# Patient Record
Sex: FEMALE | URBAN - METROPOLITAN AREA
[De-identification: names, ages, dates, MRNs, and addresses within clinical notes are randomized per-mention and may not be internally consistent; named-entity substitution may affect disease eponyms.]

---

## 2017-03-28 ENCOUNTER — OFFICE (OUTPATIENT)
Dept: URBAN - METROPOLITAN AREA OTHER 6 | Facility: OTHER | Age: 77
End: 2017-03-28
Payer: COMMERCIAL

## 2017-03-28 VITALS
HEART RATE: 85 BPM | DIASTOLIC BLOOD PRESSURE: 76 MMHG | SYSTOLIC BLOOD PRESSURE: 120 MMHG | HEIGHT: 63 IN | WEIGHT: 126 LBS

## 2017-03-28 DIAGNOSIS — K52.89 OTHER SPECIFIED NONINFECTIVE GASTROENTERITIS AND COLITIS: ICD-10-CM

## 2017-03-28 DIAGNOSIS — R19.7 DIARRHEA, UNSPECIFIED: ICD-10-CM

## 2017-03-28 PROCEDURE — 99212 OFFICE O/P EST SF 10 MIN: CPT | Performed by: INTERNAL MEDICINE

## 2017-03-28 RX ORDER — CHOLESTYRAMINE 4 G/9G
4 POWDER, FOR SUSPENSION ORAL
Qty: 30 | Refills: 11 | Status: COMPLETED
Start: 2017-03-28 | End: 2017-06-20

## 2017-03-31 ENCOUNTER — TELEPHONE (OUTPATIENT)
Dept: FAMILY MEDICINE CLINIC | Facility: CLINIC | Age: 77
End: 2017-03-31

## 2017-03-31 RX ORDER — DOXYCYCLINE 100 MG/1
100 CAPSULE ORAL 2 TIMES DAILY
Qty: 20 CAPSULE | Refills: 0 | Status: SHIPPED | OUTPATIENT
Start: 2017-03-31 | End: 2017-05-10

## 2017-03-31 RX ORDER — CETIRIZINE HYDROCHLORIDE, PSEUDOEPHEDRINE HYDROCHLORIDE 5; 120 MG/1; MG/1
1 TABLET, FILM COATED, EXTENDED RELEASE ORAL 2 TIMES DAILY
Qty: 180 TABLET | Refills: 0 | Status: SHIPPED | OUTPATIENT
Start: 2017-03-31 | End: 2017-05-31 | Stop reason: SDUPTHER

## 2017-03-31 NOTE — TELEPHONE ENCOUNTER
----- Message from Tana Greenberg sent at 3/31/2017 10:47 AM EDT -----  She came in with  on Tuesday , hes very sick   So's she's unable to come in     lsd   6-4-16 patient would like antibotic , stated she caught what her  has .  Cough, congestion     Allergies sulfa     Pharmacy [facility]  [phone]    [facility]     Patient phone    [phone]

## 2017-04-06 ENCOUNTER — TELEPHONE (OUTPATIENT)
Dept: FAMILY MEDICINE CLINIC | Facility: CLINIC | Age: 77
End: 2017-04-06

## 2017-04-06 RX ORDER — OSELTAMIVIR PHOSPHATE 75 MG/1
75 CAPSULE ORAL DAILY
Qty: 14 CAPSULE | Refills: 0 | Status: SHIPPED | OUTPATIENT
Start: 2017-04-06 | End: 2017-05-10

## 2017-04-06 NOTE — TELEPHONE ENCOUNTER
MARKI: the following telephone message was sent under patients husbands chart:     Pt's  was diagnosed with type A flu as well as Pneumonia in his lung, and is currently admitted at [facility]. Pt is concerned his wfe (Samantha) may catch it despite her taking Doxycycline.    Patient, Samantha called she is currently taking doxycycline twice daily, Centrum Silver multi vit. And Zyrtec D daily and wants to know if there is anything else she can do to prevent getting the Flu. Explained Dr Jimenez was out of office, she requested I speak with another Doctor. I spoke with Dr Davidson who approved Tamiflu 75mg once daily # 14 be sent in, and patient to stop the Doxycycline.  Patient informed and RX sent to her local [facility]

## 2017-04-24 RX ORDER — ZOLPIDEM TARTRATE 10 MG/1
TABLET ORAL
Qty: 30 TABLET | Refills: 0 | OUTPATIENT
Start: 2017-04-24 | End: 2017-05-10 | Stop reason: SDUPTHER

## 2017-05-10 ENCOUNTER — OFFICE VISIT (OUTPATIENT)
Dept: FAMILY MEDICINE CLINIC | Facility: CLINIC | Age: 77
End: 2017-05-10

## 2017-05-10 VITALS
BODY MASS INDEX: 20.66 KG/M2 | WEIGHT: 121 LBS | HEIGHT: 64 IN | DIASTOLIC BLOOD PRESSURE: 76 MMHG | TEMPERATURE: 98.4 F | SYSTOLIC BLOOD PRESSURE: 130 MMHG

## 2017-05-10 DIAGNOSIS — L28.0 NEURODERMATITIS: Primary | ICD-10-CM

## 2017-05-10 DIAGNOSIS — F51.01 PRIMARY INSOMNIA: ICD-10-CM

## 2017-05-10 PROBLEM — G47.00 INSOMNIA: Status: ACTIVE | Noted: 2017-05-10

## 2017-05-10 PROCEDURE — 99213 OFFICE O/P EST LOW 20 MIN: CPT | Performed by: FAMILY MEDICINE

## 2017-05-10 RX ORDER — ZOLPIDEM TARTRATE 10 MG/1
10 TABLET ORAL NIGHTLY PRN
Qty: 30 TABLET | Refills: 5 | OUTPATIENT
Start: 2017-05-10 | End: 2017-05-10 | Stop reason: SDUPTHER

## 2017-05-10 RX ORDER — ZOLPIDEM TARTRATE 10 MG/1
10 TABLET ORAL NIGHTLY PRN
Qty: 30 TABLET | Refills: 5 | Status: SHIPPED | OUTPATIENT
Start: 2017-05-10 | End: 2017-05-31 | Stop reason: SDUPTHER

## 2017-05-31 RX ORDER — CETIRIZINE HYDROCHLORIDE, PSEUDOEPHEDRINE HYDROCHLORIDE 5; 120 MG/1; MG/1
1 TABLET, FILM COATED, EXTENDED RELEASE ORAL 2 TIMES DAILY
Qty: 60 TABLET | Refills: 6 | Status: SHIPPED | OUTPATIENT
Start: 2017-05-31 | End: 2018-02-20 | Stop reason: SDUPTHER

## 2017-05-31 RX ORDER — ZOLPIDEM TARTRATE 10 MG/1
10 TABLET ORAL NIGHTLY PRN
Qty: 30 TABLET | Refills: 5 | Status: SHIPPED | OUTPATIENT
Start: 2017-05-31 | End: 2017-11-13 | Stop reason: SDUPTHER

## 2017-06-20 ENCOUNTER — OFFICE (OUTPATIENT)
Dept: URBAN - METROPOLITAN AREA OTHER 6 | Facility: OTHER | Age: 77
End: 2017-06-20
Payer: COMMERCIAL

## 2017-06-20 VITALS
HEART RATE: 76 BPM | DIASTOLIC BLOOD PRESSURE: 70 MMHG | HEIGHT: 63 IN | SYSTOLIC BLOOD PRESSURE: 120 MMHG | WEIGHT: 122 LBS

## 2017-06-20 DIAGNOSIS — K52.89 OTHER SPECIFIED NONINFECTIVE GASTROENTERITIS AND COLITIS: ICD-10-CM

## 2017-06-20 PROCEDURE — 99212 OFFICE O/P EST SF 10 MIN: CPT | Performed by: INTERNAL MEDICINE

## 2017-06-29 ENCOUNTER — TRANSCRIBE ORDERS (OUTPATIENT)
Dept: ADMINISTRATIVE | Facility: HOSPITAL | Age: 77
End: 2017-06-29

## 2017-06-29 DIAGNOSIS — Z79.899 LONG-TERM USE OF HIGH-RISK MEDICATION: ICD-10-CM

## 2017-06-29 DIAGNOSIS — Z13.820 SCREENING FOR OSTEOPOROSIS: Primary | ICD-10-CM

## 2017-06-29 DIAGNOSIS — Z78.0 POST-MENOPAUSAL: ICD-10-CM

## 2017-07-12 ENCOUNTER — HOSPITAL ENCOUNTER (OUTPATIENT)
Dept: BONE DENSITY | Facility: HOSPITAL | Age: 77
Discharge: HOME | End: 2017-07-12
Attending: INTERNAL MEDICINE | Admitting: INTERNAL MEDICINE

## 2017-07-12 DIAGNOSIS — Z79.899 LONG-TERM USE OF HIGH-RISK MEDICATION: ICD-10-CM

## 2017-07-12 DIAGNOSIS — Z13.820 SCREENING FOR OSTEOPOROSIS: ICD-10-CM

## 2017-07-12 DIAGNOSIS — Z78.0 POST-MENOPAUSAL: ICD-10-CM

## 2017-07-12 PROCEDURE — 77080 DXA BONE DENSITY AXIAL: CPT

## 2017-10-18 ENCOUNTER — OFFICE (OUTPATIENT)
Dept: URBAN - METROPOLITAN AREA OTHER 6 | Facility: OTHER | Age: 77
End: 2017-10-18
Payer: COMMERCIAL

## 2017-10-18 VITALS
WEIGHT: 122 LBS | HEART RATE: 86 BPM | DIASTOLIC BLOOD PRESSURE: 70 MMHG | HEIGHT: 63 IN | SYSTOLIC BLOOD PRESSURE: 120 MMHG

## 2017-10-18 DIAGNOSIS — K52.89 OTHER SPECIFIED NONINFECTIVE GASTROENTERITIS AND COLITIS: ICD-10-CM

## 2017-10-18 DIAGNOSIS — R19.7 DIARRHEA, UNSPECIFIED: ICD-10-CM

## 2017-10-18 PROCEDURE — 99212 OFFICE O/P EST SF 10 MIN: CPT | Performed by: INTERNAL MEDICINE

## 2017-11-13 ENCOUNTER — OFFICE VISIT (OUTPATIENT)
Dept: FAMILY MEDICINE CLINIC | Facility: CLINIC | Age: 77
End: 2017-11-13

## 2017-11-13 VITALS
HEIGHT: 64 IN | SYSTOLIC BLOOD PRESSURE: 136 MMHG | OXYGEN SATURATION: 96 % | TEMPERATURE: 98.4 F | WEIGHT: 125 LBS | BODY MASS INDEX: 21.34 KG/M2 | HEART RATE: 80 BPM | DIASTOLIC BLOOD PRESSURE: 74 MMHG

## 2017-11-13 DIAGNOSIS — F51.01 PRIMARY INSOMNIA: Primary | ICD-10-CM

## 2017-11-13 PROCEDURE — 99213 OFFICE O/P EST LOW 20 MIN: CPT | Performed by: NURSE PRACTITIONER

## 2017-11-13 RX ORDER — CHOLECALCIFEROL (VITAMIN D3) 125 MCG
CAPSULE ORAL
COMMUNITY

## 2017-11-13 RX ORDER — ZOLPIDEM TARTRATE 10 MG/1
10 TABLET ORAL NIGHTLY PRN
Qty: 30 TABLET | Refills: 5 | OUTPATIENT
Start: 2017-11-13 | End: 2018-05-30 | Stop reason: SDUPTHER

## 2017-11-13 NOTE — PROGRESS NOTES
Subjective   Samantha Davila is a 77 y.o. female presents for six month follow up for [facility] up. Reports taking ambien for sleep. Sleeps approximately 6-7 hours.     Insomnia   The current episode started more than 1 year ago. The problem occurs daily. The problem has been unchanged. Associated symptoms include arthralgias. Pertinent negatives include no abdominal pain, anorexia, change in bowel habit, chest pain, chills, congestion, coughing, diaphoresis, fatigue, fever, headaches, joint swelling, myalgias, nausea, neck pain, numbness, rash, sore throat, swollen glands, urinary symptoms, vertigo, visual change, vomiting or weakness. Nothing aggravates the symptoms. She has tried nothing for the symptoms. The treatment provided moderate relief.        The following portions of the patient's history were reviewed and updated as appropriate: allergies, current medications, past family history, past medical history, past social history, past surgical history and problem list.    Review of Systems   Constitutional: Negative.  Negative for chills, diaphoresis, fatigue and fever.   HENT: Negative.  Negative for congestion and sore throat.    Eyes: Negative.    Respiratory: Negative.  Negative for cough.    Cardiovascular: Negative.  Negative for chest pain.   Gastrointestinal: Negative.  Negative for abdominal pain, anorexia, change in bowel habit, nausea and vomiting.   Endocrine: Negative.    Genitourinary: Negative.    Musculoskeletal: Positive for arthralgias. Negative for joint swelling, myalgias and neck pain.   Skin: Negative.  Negative for rash.   Allergic/Immunologic: Negative.    Neurological: Negative.  Negative for vertigo, weakness, numbness and headaches.   Hematological: Negative.    Psychiatric/Behavioral: The patient has insomnia.        Objective   Physical Exam   Constitutional: She is oriented to person, place, and time. She appears well-developed and well-nourished.   HENT:   Head: Normocephalic.    Cardiovascular: Normal rate, regular rhythm and normal heart sounds.  Exam reveals no gallop and no friction rub.    No murmur heard.  Pulmonary/Chest: Effort normal and breath sounds normal. No respiratory distress. She has no wheezes. She has no rales.   Abdominal: Soft. Bowel sounds are normal. She exhibits no distension. There is no tenderness.   Neurological: She is alert and oriented to person, place, and time.   Skin: Skin is warm and dry.   Psychiatric: She has a normal mood and affect.   Vitals reviewed.      Assessment/Plan   Samantha was seen today for insomnia.    Diagnoses and all orders for this visit:    Primary insomnia

## 2017-11-13 NOTE — PATIENT INSTRUCTIONS
Insomnia  Insomnia is a sleep disorder that makes it difficult to fall asleep or to stay asleep. Insomnia can cause tiredness (fatigue), low energy, difficulty concentrating, mood swings, and poor performance at work or school.   There are three different ways to classify insomnia:   · Difficulty falling asleep.  · Difficulty staying asleep.  · Waking up too early in the morning.  Any type of insomnia can be long-term (chronic) or short-term (acute). Both are common. Short-term insomnia usually lasts for three months or less. Chronic insomnia occurs at least three times a week for longer than three months.  CAUSES   Insomnia may be caused by another condition, situation, or substance, such as:  · Anxiety.  · Certain medicines.  · Gastroesophageal reflux disease (GERD) or other gastrointestinal conditions.  · Asthma or other breathing conditions.  · Restless legs syndrome, sleep apnea, or other sleep disorders.  · Chronic pain.  · Menopause. This may include hot flashes.  · Stroke.  · Abuse of alcohol, tobacco, or illegal drugs.  · Depression.  · Caffeine.    · Neurological disorders, such as Alzheimer disease.  · An overactive thyroid (hyperthyroidism).  The cause of insomnia may not be known.  RISK FACTORS  Risk factors for insomnia include:  · Gender. Women are more commonly affected than men.  · Age. Insomnia is more common as you get older.  · Stress. This may involve your professional or personal life.  · Income. Insomnia is more common in people with lower income.  · Lack of exercise.    · Irregular work schedule or night shifts.  · Traveling between different time zones.  SIGNS AND SYMPTOMS  If you have insomnia, trouble falling asleep or trouble staying asleep is the main symptom. This may lead to other symptoms, such as:  · Feeling fatigued.  · Feeling nervous about going to sleep.  · Not feeling rested in the morning.  · Having trouble concentrating.  · Feeling irritable, anxious, or depressed.  TREATMENT    Treatment for insomnia depends on the cause. If your insomnia is caused by an underlying condition, treatment will focus on addressing the condition. Treatment may also include:   · Medicines to help you sleep.  · Counseling or therapy.  · Lifestyle adjustments.  HOME CARE INSTRUCTIONS   · Take medicines only as directed by your health care provider.  · Keep regular sleeping and waking hours. Avoid naps.  · Keep a sleep diary to help you and your health care provider figure out what could be causing your insomnia. Include:      When you sleep.    When you wake up during the night.    How well you sleep.      How rested you feel the next day.    Any side effects of medicines you are taking.    What you eat and drink.    · Make your bedroom a comfortable place where it is easy to fall asleep:    Put up shades or special blackout curtains to block light from outside.    Use a white noise machine to block noise.    Keep the temperature cool.    · Exercise regularly as directed by your health care provider. Avoid exercising right before bedtime.  · Use relaxation techniques to manage stress. Ask your health care provider to suggest some techniques that may work well for you. These may include:    Breathing exercises.    Routines to release muscle tension.    Visualizing peaceful scenes.  · Cut back on alcohol, caffeinated beverages, and cigarettes, especially close to bedtime. These can disrupt your sleep.  · Do not overeat or eat spicy foods right before bedtime. This can lead to digestive discomfort that can make it hard for you to sleep.  · Limit screen use before bedtime. This includes:    Watching TV.    Using your smartphone, tablet, and computer.  · Stick to a routine. This can help you fall asleep faster. Try to do a quiet activity, brush your teeth, and go to bed at the same time each night.  · Get out of bed if you are still awake after 15 minutes of trying to sleep. Keep the lights down, but try reading or  doing a quiet activity. When you feel sleepy, go back to bed.  · Make sure that you drive carefully. Avoid driving if you feel very sleepy.  · Keep all follow-up appointments as directed by your health care provider. This is important.  SEEK MEDICAL CARE IF:   · You are tired throughout the day or have trouble in your daily routine due to sleepiness.  · You continue to have sleep problems or your sleep problems get worse.  SEEK IMMEDIATE MEDICAL CARE IF:   · You have serious thoughts about hurting yourself or someone else.     This information is not intended to replace advice given to you by your health care provider. Make sure you discuss any questions you have with your health care provider.     Document Released: 12/15/2001 Document Revised: 09/07/2016 Document Reviewed: 09/18/2015  Elsevier Interactive Patient Education ©2017 [employer]

## 2018-01-24 ENCOUNTER — OFFICE (OUTPATIENT)
Dept: URBAN - METROPOLITAN AREA OTHER 6 | Facility: OTHER | Age: 78
End: 2018-01-24
Payer: COMMERCIAL

## 2018-01-24 VITALS
SYSTOLIC BLOOD PRESSURE: 116 MMHG | DIASTOLIC BLOOD PRESSURE: 64 MMHG | HEIGHT: 63 IN | HEART RATE: 84 BPM | WEIGHT: 122 LBS

## 2018-01-24 DIAGNOSIS — K52.89 OTHER SPECIFIED NONINFECTIVE GASTROENTERITIS AND COLITIS: ICD-10-CM

## 2018-01-24 DIAGNOSIS — R19.7 DIARRHEA, UNSPECIFIED: ICD-10-CM

## 2018-01-24 PROCEDURE — 99212 OFFICE O/P EST SF 10 MIN: CPT | Performed by: INTERNAL MEDICINE

## 2018-02-20 RX ORDER — CETIRIZINE HCL, PSEUDOEPHEDRINE HCL 5; 120 MG/1; MG/1
TABLET, EXTENDED RELEASE ORAL
Qty: 60 TABLET | Refills: 6 | Status: SHIPPED | OUTPATIENT
Start: 2018-02-20 | End: 2019-02-25 | Stop reason: SDUPTHER

## 2018-04-05 RX ORDER — AMOXICILLIN 500 MG/1
500 CAPSULE ORAL 3 TIMES DAILY
Qty: 30 CAPSULE | Refills: 0 | Status: SHIPPED | OUTPATIENT
Start: 2018-04-05 | End: 2018-04-06 | Stop reason: SDUPTHER

## 2018-04-06 ENCOUNTER — OFFICE VISIT (OUTPATIENT)
Dept: INTERNAL MEDICINE | Facility: CLINIC | Age: 78
End: 2018-04-06

## 2018-04-06 VITALS
SYSTOLIC BLOOD PRESSURE: 120 MMHG | HEART RATE: 89 BPM | WEIGHT: 123.4 LBS | RESPIRATION RATE: 16 BRPM | HEIGHT: 64 IN | TEMPERATURE: 98.2 F | BODY MASS INDEX: 21.07 KG/M2 | DIASTOLIC BLOOD PRESSURE: 80 MMHG | OXYGEN SATURATION: 95 %

## 2018-04-06 DIAGNOSIS — J06.9 VIRAL UPPER RESPIRATORY TRACT INFECTION: Primary | ICD-10-CM

## 2018-04-06 PROCEDURE — 99213 OFFICE O/P EST LOW 20 MIN: CPT | Performed by: NURSE PRACTITIONER

## 2018-04-06 RX ORDER — AZITHROMYCIN 1 G
1 PACKET (EA) ORAL ONCE
Qty: 1 PACKET | Refills: 0 | Status: SHIPPED | OUTPATIENT
Start: 2018-04-06 | End: 2018-04-06

## 2018-04-08 PROBLEM — J06.9 VIRAL UPPER RESPIRATORY TRACT INFECTION: Status: ACTIVE | Noted: 2018-04-08

## 2018-04-08 NOTE — PROGRESS NOTES
Subjective   Samantha Davila is a 78 y.o. female.     Chief Complaint   Patient presents with   • Other     Sinus pressure         Mrs. Dvaila is a known patient to our practice here today for evaluation of URI. She has been experiencing Ha, sore throat, and some coughing for the past 5-7 days. No fever or confusion. No bowel or bladder problems. She has been trying OTC mucinex and dayquil. This has been successful in temporarily relieving her symptoms. No other complaints at this time. No exacerbating or aggravating symptoms. No other complaints at this time.              The following portions of the patient's history were reviewed and updated as appropriate: allergies, current medications, past social history and problem list.    Review of Systems   Constitutional: Negative.    HENT: Positive for sore throat.    Eyes: Negative.    Respiratory: Positive for cough.    Endocrine: Negative.    Musculoskeletal: Negative.    Allergic/Immunologic: Negative.    Neurological: Positive for headaches.       Objective   Vitals:    04/06/18 1454   BP: 120/80   Pulse: 89   Resp: 16   Temp: 98.2 °F (36.8 °C)   SpO2: 95%     Physical Exam   Constitutional: She is oriented to person, place, and time. She appears well-developed and well-nourished.   HENT:   Head: Normocephalic.   Right Ear: External ear normal.   Left Ear: External ear normal.   Nose: Nose normal.   Mouth/Throat: Oropharynx is clear and moist.   Eyes: Conjunctivae and EOM are normal. Pupils are equal, round, and reactive to light.   Neck: Normal range of motion.   Cardiovascular: Normal rate, regular rhythm, normal heart sounds and intact distal pulses.    Pulmonary/Chest: She has wheezes in the right upper field and the left upper field.   Abdominal: Soft. Bowel sounds are normal.   Neurological: She is alert and oriented to person, place, and time. She has normal reflexes.   Skin: Skin is warm and dry. Capillary refill takes more than 3 seconds.   Psychiatric: She  has a normal mood and affect. Her behavior is normal. Judgment and thought content normal.       Assessment/Plan   Problem List Items Addressed This Visit     Viral upper respiratory tract infection - Primary      Other Visit Diagnoses    None.            Likely viral URI. Supportive OTC measures. Will add z-renuka.

## 2018-05-31 RX ORDER — ZOLPIDEM TARTRATE 10 MG/1
TABLET ORAL
Qty: 30 TABLET | Refills: 5 | OUTPATIENT
Start: 2018-05-31 | End: 2018-10-01 | Stop reason: SDUPTHER

## 2018-08-23 ENCOUNTER — OFFICE (OUTPATIENT)
Dept: URBAN - METROPOLITAN AREA CLINIC 66 | Facility: CLINIC | Age: 78
End: 2018-08-23
Payer: COMMERCIAL

## 2018-08-23 VITALS
DIASTOLIC BLOOD PRESSURE: 80 MMHG | HEIGHT: 63 IN | WEIGHT: 116 LBS | HEART RATE: 84 BPM | SYSTOLIC BLOOD PRESSURE: 110 MMHG

## 2018-08-23 DIAGNOSIS — R19.7 DIARRHEA, UNSPECIFIED: ICD-10-CM

## 2018-08-23 DIAGNOSIS — K52.89 OTHER SPECIFIED NONINFECTIVE GASTROENTERITIS AND COLITIS: ICD-10-CM

## 2018-08-23 PROCEDURE — 99212 OFFICE O/P EST SF 10 MIN: CPT | Performed by: INTERNAL MEDICINE

## 2018-10-02 RX ORDER — ZOLPIDEM TARTRATE 10 MG/1
10 TABLET ORAL NIGHTLY PRN
Qty: 30 TABLET | Refills: 5 | Status: SHIPPED | OUTPATIENT
Start: 2018-10-02 | End: 2018-10-30

## 2018-10-04 RX ORDER — ZOLPIDEM TARTRATE 10 MG/1
TABLET ORAL
Qty: 30 TABLET | Refills: 0 | Status: SHIPPED | OUTPATIENT
Start: 2018-10-04 | End: 2018-12-19 | Stop reason: SDUPTHER

## 2018-10-30 ENCOUNTER — OFFICE VISIT (OUTPATIENT)
Dept: INTERNAL MEDICINE | Facility: CLINIC | Age: 78
End: 2018-10-30

## 2018-10-30 VITALS
BODY MASS INDEX: 20.22 KG/M2 | DIASTOLIC BLOOD PRESSURE: 75 MMHG | HEIGHT: 64 IN | SYSTOLIC BLOOD PRESSURE: 126 MMHG | OXYGEN SATURATION: 96 % | TEMPERATURE: 98.7 F | HEART RATE: 83 BPM | WEIGHT: 118.4 LBS

## 2018-10-30 DIAGNOSIS — F51.01 PRIMARY INSOMNIA: Primary | ICD-10-CM

## 2018-10-30 PROCEDURE — 99213 OFFICE O/P EST LOW 20 MIN: CPT | Performed by: FAMILY MEDICINE

## 2018-10-30 RX ORDER — ZOLPIDEM TARTRATE 10 MG/1
10 TABLET ORAL NIGHTLY PRN
Qty: 30 TABLET | Refills: 5 | Status: SHIPPED | OUTPATIENT
Start: 2018-10-30 | End: 2019-01-28

## 2018-10-30 RX ORDER — ZOLPIDEM TARTRATE 10 MG/1
10 TABLET ORAL NIGHTLY PRN
Qty: 30 TABLET | Refills: 5 | Status: SHIPPED | OUTPATIENT
Start: 2018-10-30 | End: 2018-10-30

## 2018-10-30 NOTE — PROGRESS NOTES
"CC:INSOMNIA    Subjective.../HPI  Patient present today with    I have reviewed the patient's medical history in detail and updated the computerized patient record.    Past Medical History:   Diagnosis Date   • Insomnia        No past surgical history on file.    No family history on file.    Social History     Social History   • Marital status:      Spouse name: N/A   • Number of children: N/A   • Years of education: N/A     Occupational History   • Not on file.     Social History Main Topics   • Smoking status: Never Smoker   • Smokeless tobacco: Not on file   • Alcohol use Yes   • Drug use: Unknown   • Sexual activity: Defer     Other Topics Concern   • Not on file     Social History Narrative   • No narrative on file         There is no immunization history on file for this patient.    Review of Systems:   Review of Systems   Constitutional: Negative.    HENT: Negative.    Eyes: Negative.    Respiratory: Negative.    Cardiovascular: Negative.    Gastrointestinal: Negative.    Endocrine: Negative.    Genitourinary: Negative.    Musculoskeletal: Negative.    Skin: Negative.    Allergic/Immunologic: Negative.    Neurological: Negative.    Hematological: Negative.    Psychiatric/Behavioral: Negative.          Physical Exam   Constitutional: She is oriented to person, place, and time. She appears well-developed and well-nourished.   Cardiovascular: Normal rate, regular rhythm and normal heart sounds.    Pulmonary/Chest: Effort normal and breath sounds normal.   Neurological: She is alert and oriented to person, place, and time.   Psychiatric: She has a normal mood and affect. Her behavior is normal.   Vitals reviewed.        Vital Signs     Vitals:    10/30/18 1355   BP: 126/75   BP Location: Left arm   Patient Position: Sitting   Cuff Size: Small Adult   Pulse: 83   Temp: 98.7 °F (37.1 °C)   TempSrc: Oral   SpO2: 96%   Weight: 53.7 kg (118 lb 6.4 oz)   Height: 161.3 cm (63.5\")          Results Review:  "     REVIEWED AND DISCUSSED CLINICAL RESULTS WITH PATIENT      Requested Prescriptions      No prescriptions requested or ordered in this encounter         Current Outpatient Prescriptions:   •  ALL DAY ALLERGY-D 5-120 MG per 12 hr tablet, TAKE ONE TABLET BY MOUTH TWICE DAILY, Disp: 60 tablet, Rfl: 6  •  Budesonide (ENTOCORT EC) 3 MG 24 hr capsule, TAKE 3 CS PO QD, Disp: , Rfl: 12  •  Cholecalciferol (VITAMIN D3) 2000 units tablet, Take  by mouth., Disp: , Rfl:   •  Multiple Vitamins-Minerals (WOMENS 50+ MULTI VITAMIN/MIN PO), Take  by mouth., Disp: , Rfl:   •  Probiotic Product (PROBIOTIC DAILY PO), Take  by mouth., Disp: , Rfl:   •  zolpidem (AMBIEN) 10 MG tablet, Take 1 tablet by mouth At Night As Needed for Sleep., Disp: 30 tablet, Rfl: 5  •  zolpidem (AMBIEN) 10 MG tablet, TAKE 1 TABLET BY MOUTH EVERY NIGHT AT BEDTIME AS NEEDED FOR SLEEP, Disp: 30 tablet, Rfl: 0    Procedures          There are no diagnoses linked to this encounter.     No Follow-up on file.    Bobby Jimenez M.D  10/30/18  2:08 PM

## 2018-11-20 ENCOUNTER — CLINICAL SUPPORT (OUTPATIENT)
Dept: INTERNAL MEDICINE | Facility: CLINIC | Age: 78
End: 2018-11-20

## 2018-11-20 DIAGNOSIS — Z23 FLU VACCINE NEED: Primary | ICD-10-CM

## 2018-11-20 PROCEDURE — G0008 ADMIN INFLUENZA VIRUS VAC: HCPCS | Performed by: NURSE PRACTITIONER

## 2018-11-20 PROCEDURE — 90662 IIV NO PRSV INCREASED AG IM: CPT | Performed by: NURSE PRACTITIONER

## 2018-12-14 ENCOUNTER — OFFICE (OUTPATIENT)
Dept: URBAN - METROPOLITAN AREA CLINIC 65 | Facility: CLINIC | Age: 78
End: 2018-12-14
Payer: COMMERCIAL

## 2018-12-14 VITALS
SYSTOLIC BLOOD PRESSURE: 122 MMHG | WEIGHT: 116 LBS | HEIGHT: 63 IN | HEART RATE: 76 BPM | DIASTOLIC BLOOD PRESSURE: 90 MMHG

## 2018-12-14 DIAGNOSIS — R19.7 DIARRHEA, UNSPECIFIED: ICD-10-CM

## 2018-12-14 DIAGNOSIS — K52.89 OTHER SPECIFIED NONINFECTIVE GASTROENTERITIS AND COLITIS: ICD-10-CM

## 2018-12-14 PROCEDURE — 99214 OFFICE O/P EST MOD 30 MIN: CPT | Performed by: INTERNAL MEDICINE

## 2018-12-19 ENCOUNTER — TRANSCRIBE ORDERS (OUTPATIENT)
Dept: LAB | Facility: HOSPITAL | Age: 78
End: 2018-12-19

## 2018-12-19 ENCOUNTER — LAB (OUTPATIENT)
Dept: LAB | Facility: HOSPITAL | Age: 78
End: 2018-12-19
Attending: INTERNAL MEDICINE

## 2018-12-19 DIAGNOSIS — E55.9 VITAMIN D DEFICIENCY: ICD-10-CM

## 2018-12-19 DIAGNOSIS — K58.9 IRRITABLE BOWEL SYNDROME, UNSPECIFIED TYPE: ICD-10-CM

## 2018-12-19 DIAGNOSIS — R19.7 DIARRHEA, UNSPECIFIED TYPE: ICD-10-CM

## 2018-12-19 DIAGNOSIS — K52.839 MICROSCOPIC COLITIS, UNSPECIFIED MICROSCOPIC COLITIS TYPE: Primary | ICD-10-CM

## 2018-12-19 DIAGNOSIS — K52.839 MICROSCOPIC COLITIS, UNSPECIFIED MICROSCOPIC COLITIS TYPE: ICD-10-CM

## 2018-12-19 LAB
25(OH)D3 SERPL-MCNC: 85.9 NG/ML (ref 30–100)
ALBUMIN SERPL-MCNC: 4.2 G/DL (ref 3.5–5.2)
ALBUMIN/GLOB SERPL: 1.6 G/DL
ALP SERPL-CCNC: 69 U/L (ref 39–117)
ALT SERPL W P-5'-P-CCNC: 13 U/L (ref 1–33)
ANION GAP SERPL CALCULATED.3IONS-SCNC: 11.8 MMOL/L
AST SERPL-CCNC: 18 U/L (ref 1–32)
BASOPHILS # BLD AUTO: 0.04 10*3/MM3 (ref 0–0.2)
BASOPHILS NFR BLD AUTO: 0.5 % (ref 0–1.5)
BILIRUB SERPL-MCNC: 0.4 MG/DL (ref 0.1–1.2)
BUN BLD-MCNC: 11 MG/DL (ref 8–23)
BUN/CREAT SERPL: 18 (ref 7–25)
CALCIUM SPEC-SCNC: 9.6 MG/DL (ref 8.6–10.5)
CHLORIDE SERPL-SCNC: 102 MMOL/L (ref 98–107)
CO2 SERPL-SCNC: 27.2 MMOL/L (ref 22–29)
CORTIS SERPL-MCNC: 7.09 MCG/DL
CREAT BLD-MCNC: 0.61 MG/DL (ref 0.57–1)
DEPRECATED RDW RBC AUTO: 46.6 FL (ref 37–54)
EOSINOPHIL # BLD AUTO: 0.34 10*3/MM3 (ref 0–0.7)
EOSINOPHIL NFR BLD AUTO: 4.4 % (ref 0.3–6.2)
ERYTHROCYTE [DISTWIDTH] IN BLOOD BY AUTOMATED COUNT: 13.7 % (ref 11.7–13)
GFR SERPL CREATININE-BSD FRML MDRD: 95 ML/MIN/1.73
GLOBULIN UR ELPH-MCNC: 2.7 GM/DL
GLUCOSE BLD-MCNC: 97 MG/DL (ref 65–99)
HCT VFR BLD AUTO: 39.5 % (ref 35.6–45.5)
HGB BLD-MCNC: 13.2 G/DL (ref 11.9–15.5)
IMM GRANULOCYTES # BLD: 0.01 10*3/MM3 (ref 0–0.03)
IMM GRANULOCYTES NFR BLD: 0.1 % (ref 0–0.5)
LYMPHOCYTES # BLD AUTO: 2.52 10*3/MM3 (ref 0.9–4.8)
LYMPHOCYTES NFR BLD AUTO: 32.4 % (ref 19.6–45.3)
MCH RBC QN AUTO: 31.2 PG (ref 26.9–32)
MCHC RBC AUTO-ENTMCNC: 33.4 G/DL (ref 32.4–36.3)
MCV RBC AUTO: 93.4 FL (ref 80.5–98.2)
MONOCYTES # BLD AUTO: 0.57 10*3/MM3 (ref 0.2–1.2)
MONOCYTES NFR BLD AUTO: 7.3 % (ref 5–12)
NEUTROPHILS # BLD AUTO: 4.31 10*3/MM3 (ref 1.9–8.1)
NEUTROPHILS NFR BLD AUTO: 55.4 % (ref 42.7–76)
PLATELET # BLD AUTO: 338 10*3/MM3 (ref 140–500)
PMV BLD AUTO: 9.9 FL (ref 6–12)
POTASSIUM BLD-SCNC: 4.2 MMOL/L (ref 3.5–5.2)
PROT SERPL-MCNC: 6.9 G/DL (ref 6–8.5)
RBC # BLD AUTO: 4.23 10*6/MM3 (ref 3.9–5.2)
SODIUM BLD-SCNC: 141 MMOL/L (ref 136–145)
VIT B12 BLD-MCNC: 628 PG/ML (ref 211–946)
WBC NRBC COR # BLD: 7.78 10*3/MM3 (ref 4.5–10.7)

## 2018-12-19 PROCEDURE — 80053 COMPREHEN METABOLIC PANEL: CPT

## 2018-12-19 PROCEDURE — 82306 VITAMIN D 25 HYDROXY: CPT

## 2018-12-19 PROCEDURE — 82533 TOTAL CORTISOL: CPT

## 2018-12-19 PROCEDURE — 85025 COMPLETE CBC W/AUTO DIFF WBC: CPT

## 2018-12-19 PROCEDURE — 82607 VITAMIN B-12: CPT

## 2018-12-19 PROCEDURE — 36415 COLL VENOUS BLD VENIPUNCTURE: CPT

## 2018-12-20 RX ORDER — ZOLPIDEM TARTRATE 10 MG/1
TABLET ORAL
Qty: 30 TABLET | Refills: 0 | Status: SHIPPED | OUTPATIENT
Start: 2018-12-20 | End: 2019-01-28 | Stop reason: SDUPTHER

## 2019-01-28 RX ORDER — ZOLPIDEM TARTRATE 10 MG/1
TABLET ORAL
Qty: 30 TABLET | Refills: 0 | Status: SHIPPED | OUTPATIENT
Start: 2019-01-28 | End: 2019-03-08 | Stop reason: HOSPADM

## 2019-02-25 RX ORDER — CETIRIZINE HYDROCHLORIDE, PSEUDOEPHEDRINE HYDROCHLORIDE 5; 120 MG/1; MG/1
1 TABLET, FILM COATED, EXTENDED RELEASE ORAL 2 TIMES DAILY
Qty: 60 TABLET | Refills: 6 | Status: SHIPPED | OUTPATIENT
Start: 2019-02-25 | End: 2019-04-25 | Stop reason: SDUPTHER

## 2019-03-02 ENCOUNTER — APPOINTMENT (OUTPATIENT)
Dept: GENERAL RADIOLOGY | Facility: HOSPITAL | Age: 79
End: 2019-03-02

## 2019-03-02 ENCOUNTER — APPOINTMENT (OUTPATIENT)
Dept: CT IMAGING | Facility: HOSPITAL | Age: 79
End: 2019-03-02

## 2019-03-02 ENCOUNTER — HOSPITAL ENCOUNTER (INPATIENT)
Facility: HOSPITAL | Age: 79
LOS: 6 days | Discharge: NURSING, REHAB OR LONG-TERM CARE | End: 2019-03-08
Attending: EMERGENCY MEDICINE | Admitting: INTERNAL MEDICINE

## 2019-03-02 DIAGNOSIS — S72.001A CLOSED FRACTURE OF NECK OF RIGHT FEMUR, INITIAL ENCOUNTER (HCC): Primary | ICD-10-CM

## 2019-03-02 LAB
ABO GROUP BLD: NORMAL
ALBUMIN SERPL-MCNC: 4.4 G/DL (ref 3.5–5.2)
ALBUMIN/GLOB SERPL: 1.8 G/DL
ALP SERPL-CCNC: 67 U/L (ref 39–117)
ALT SERPL W P-5'-P-CCNC: 31 U/L (ref 1–33)
ANION GAP SERPL CALCULATED.3IONS-SCNC: 13.5 MMOL/L
APTT PPP: 23.9 SECONDS (ref 22.7–35.4)
AST SERPL-CCNC: 50 U/L (ref 1–32)
BASOPHILS # BLD AUTO: 0.04 10*3/MM3 (ref 0–0.2)
BASOPHILS NFR BLD AUTO: 0.3 % (ref 0–1.5)
BILIRUB SERPL-MCNC: 0.2 MG/DL (ref 0.1–1.2)
BLD GP AB SCN SERPL QL: NEGATIVE
BUN BLD-MCNC: 13 MG/DL (ref 8–23)
BUN/CREAT SERPL: 22 (ref 7–25)
CALCIUM SPEC-SCNC: 8.9 MG/DL (ref 8.6–10.5)
CHLORIDE SERPL-SCNC: 100 MMOL/L (ref 98–107)
CO2 SERPL-SCNC: 26.5 MMOL/L (ref 22–29)
CREAT BLD-MCNC: 0.59 MG/DL (ref 0.57–1)
DEPRECATED RDW RBC AUTO: 46.5 FL (ref 37–54)
EOSINOPHIL # BLD AUTO: 0.03 10*3/MM3 (ref 0–0.4)
EOSINOPHIL NFR BLD AUTO: 0.2 % (ref 0.3–6.2)
ERYTHROCYTE [DISTWIDTH] IN BLOOD BY AUTOMATED COUNT: 13.2 % (ref 12.3–15.4)
GFR SERPL CREATININE-BSD FRML MDRD: 98 ML/MIN/1.73
GLOBULIN UR ELPH-MCNC: 2.5 GM/DL
GLUCOSE BLD-MCNC: 108 MG/DL (ref 65–99)
HCT VFR BLD AUTO: 42.7 % (ref 34–46.6)
HGB BLD-MCNC: 13.7 G/DL (ref 12–15.9)
IMM GRANULOCYTES # BLD AUTO: 0.1 10*3/MM3 (ref 0–0.05)
IMM GRANULOCYTES NFR BLD AUTO: 0.7 % (ref 0–0.5)
INR PPP: 1.04 (ref 0.9–1.1)
LYMPHOCYTES # BLD AUTO: 1.19 10*3/MM3 (ref 0.7–3.1)
LYMPHOCYTES NFR BLD AUTO: 8 % (ref 19.6–45.3)
MCH RBC QN AUTO: 30.5 PG (ref 26.6–33)
MCHC RBC AUTO-ENTMCNC: 32.1 G/DL (ref 31.5–35.7)
MCV RBC AUTO: 95.1 FL (ref 79–97)
MONOCYTES # BLD AUTO: 0.86 10*3/MM3 (ref 0.1–0.9)
MONOCYTES NFR BLD AUTO: 5.8 % (ref 5–12)
NEUTROPHILS # BLD AUTO: 12.6 10*3/MM3 (ref 1.4–7)
NEUTROPHILS NFR BLD AUTO: 85 % (ref 42.7–76)
NRBC BLD AUTO-RTO: 0 /100 WBC (ref 0–0)
PLATELET # BLD AUTO: 326 10*3/MM3 (ref 140–450)
PMV BLD AUTO: 9.6 FL (ref 6–12)
POTASSIUM BLD-SCNC: 3.7 MMOL/L (ref 3.5–5.2)
PROT SERPL-MCNC: 6.9 G/DL (ref 6–8.5)
PROTHROMBIN TIME: 13.4 SECONDS (ref 11.7–14.2)
RBC # BLD AUTO: 4.49 10*6/MM3 (ref 3.77–5.28)
RH BLD: POSITIVE
SODIUM BLD-SCNC: 140 MMOL/L (ref 136–145)
T&S EXPIRATION DATE: NORMAL
WBC NRBC COR # BLD: 14.82 10*3/MM3 (ref 3.4–10.8)

## 2019-03-02 PROCEDURE — 71045 X-RAY EXAM CHEST 1 VIEW: CPT

## 2019-03-02 PROCEDURE — 86900 BLOOD TYPING SEROLOGIC ABO: CPT | Performed by: EMERGENCY MEDICINE

## 2019-03-02 PROCEDURE — 85730 THROMBOPLASTIN TIME PARTIAL: CPT | Performed by: EMERGENCY MEDICINE

## 2019-03-02 PROCEDURE — 90715 TDAP VACCINE 7 YRS/> IM: CPT | Performed by: EMERGENCY MEDICINE

## 2019-03-02 PROCEDURE — 86901 BLOOD TYPING SEROLOGIC RH(D): CPT | Performed by: EMERGENCY MEDICINE

## 2019-03-02 PROCEDURE — 85610 PROTHROMBIN TIME: CPT | Performed by: EMERGENCY MEDICINE

## 2019-03-02 PROCEDURE — 25010000002 ONDANSETRON PER 1 MG: Performed by: EMERGENCY MEDICINE

## 2019-03-02 PROCEDURE — 70450 CT HEAD/BRAIN W/O DYE: CPT

## 2019-03-02 PROCEDURE — 90471 IMMUNIZATION ADMIN: CPT | Performed by: EMERGENCY MEDICINE

## 2019-03-02 PROCEDURE — 25010000002 MORPHINE PER 10 MG: Performed by: EMERGENCY MEDICINE

## 2019-03-02 PROCEDURE — 86850 RBC ANTIBODY SCREEN: CPT | Performed by: EMERGENCY MEDICINE

## 2019-03-02 PROCEDURE — 85025 COMPLETE CBC W/AUTO DIFF WBC: CPT | Performed by: EMERGENCY MEDICINE

## 2019-03-02 PROCEDURE — 99285 EMERGENCY DEPT VISIT HI MDM: CPT

## 2019-03-02 PROCEDURE — 25010000002 TDAP 5-2.5-18.5 LF-MCG/0.5 SUSPENSION: Performed by: EMERGENCY MEDICINE

## 2019-03-02 PROCEDURE — 73502 X-RAY EXAM HIP UNI 2-3 VIEWS: CPT

## 2019-03-02 PROCEDURE — 80053 COMPREHEN METABOLIC PANEL: CPT | Performed by: EMERGENCY MEDICINE

## 2019-03-02 RX ORDER — SODIUM CHLORIDE 0.9 % (FLUSH) 0.9 %
10 SYRINGE (ML) INJECTION AS NEEDED
Status: DISCONTINUED | OUTPATIENT
Start: 2019-03-02 | End: 2019-03-08 | Stop reason: HOSPADM

## 2019-03-02 RX ORDER — SODIUM CHLORIDE 9 MG/ML
125 INJECTION, SOLUTION INTRAVENOUS CONTINUOUS
Status: DISCONTINUED | OUTPATIENT
Start: 2019-03-02 | End: 2019-03-03

## 2019-03-02 RX ORDER — ONDANSETRON 2 MG/ML
4 INJECTION INTRAMUSCULAR; INTRAVENOUS ONCE
Status: COMPLETED | OUTPATIENT
Start: 2019-03-02 | End: 2019-03-02

## 2019-03-02 RX ORDER — MORPHINE SULFATE 2 MG/ML
4 INJECTION, SOLUTION INTRAMUSCULAR; INTRAVENOUS ONCE
Status: COMPLETED | OUTPATIENT
Start: 2019-03-02 | End: 2019-03-02

## 2019-03-02 RX ADMIN — TETANUS TOXOID, REDUCED DIPHTHERIA TOXOID AND ACELLULAR PERTUSSIS VACCINE, ADSORBED 0.5 ML: 5; 2.5; 8; 8; 2.5 SUSPENSION INTRAMUSCULAR at 22:40

## 2019-03-02 RX ADMIN — ONDANSETRON 4 MG: 2 INJECTION INTRAMUSCULAR; INTRAVENOUS at 22:46

## 2019-03-02 RX ADMIN — SODIUM CHLORIDE 125 ML/HR: 9 INJECTION, SOLUTION INTRAVENOUS at 22:43

## 2019-03-02 RX ADMIN — MORPHINE SULFATE 4 MG: 2 INJECTION, SOLUTION INTRAMUSCULAR; INTRAVENOUS at 22:47
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
## 2019-03-03 LAB
ALBUMIN SERPL-MCNC: 3.8 G/DL (ref 3.5–5.2)
ALBUMIN/GLOB SERPL: 1.7 G/DL
ALP SERPL-CCNC: 63 U/L (ref 39–117)
ALT SERPL W P-5'-P-CCNC: 32 U/L (ref 1–33)
ANION GAP SERPL CALCULATED.3IONS-SCNC: 15.7 MMOL/L
AST SERPL-CCNC: 53 U/L (ref 1–32)
BASOPHILS # BLD AUTO: 0.03 10*3/MM3 (ref 0–0.2)
BASOPHILS NFR BLD AUTO: 0.3 % (ref 0–1.5)
BILIRUB SERPL-MCNC: 0.4 MG/DL (ref 0.1–1.2)
BUN BLD-MCNC: 12 MG/DL (ref 8–23)
BUN/CREAT SERPL: 26.7 (ref 7–25)
CALCIUM SPEC-SCNC: 8.3 MG/DL (ref 8.6–10.5)
CHLORIDE SERPL-SCNC: 102 MMOL/L (ref 98–107)
CO2 SERPL-SCNC: 24.3 MMOL/L (ref 22–29)
CREAT BLD-MCNC: 0.45 MG/DL (ref 0.57–1)
DEPRECATED RDW RBC AUTO: 46.5 FL (ref 37–54)
EOSINOPHIL # BLD AUTO: 0 10*3/MM3 (ref 0–0.4)
EOSINOPHIL NFR BLD AUTO: 0 % (ref 0.3–6.2)
ERYTHROCYTE [DISTWIDTH] IN BLOOD BY AUTOMATED COUNT: 13.1 % (ref 12.3–15.4)
GFR SERPL CREATININE-BSD FRML MDRD: 134 ML/MIN/1.73
GLOBULIN UR ELPH-MCNC: 2.2 GM/DL
GLUCOSE BLD-MCNC: 119 MG/DL (ref 65–99)
HCT VFR BLD AUTO: 37.8 % (ref 34–46.6)
HGB BLD-MCNC: 12.2 G/DL (ref 12–15.9)
IMM GRANULOCYTES # BLD AUTO: 0.04 10*3/MM3 (ref 0–0.05)
IMM GRANULOCYTES NFR BLD AUTO: 0.4 % (ref 0–0.5)
LYMPHOCYTES # BLD AUTO: 1.19 10*3/MM3 (ref 0.7–3.1)
LYMPHOCYTES NFR BLD AUTO: 10.8 % (ref 19.6–45.3)
MCH RBC QN AUTO: 31 PG (ref 26.6–33)
MCHC RBC AUTO-ENTMCNC: 32.3 G/DL (ref 31.5–35.7)
MCV RBC AUTO: 95.9 FL (ref 79–97)
MONOCYTES # BLD AUTO: 0.7 10*3/MM3 (ref 0.1–0.9)
MONOCYTES NFR BLD AUTO: 6.4 % (ref 5–12)
NEUTROPHILS # BLD AUTO: 9.02 10*3/MM3 (ref 1.4–7)
NEUTROPHILS NFR BLD AUTO: 82.1 % (ref 42.7–76)
NRBC BLD AUTO-RTO: 0 /100 WBC (ref 0–0)
PLATELET # BLD AUTO: 281 10*3/MM3 (ref 140–450)
PMV BLD AUTO: 9.7 FL (ref 6–12)
POTASSIUM BLD-SCNC: 3.6 MMOL/L (ref 3.5–5.2)
PROT SERPL-MCNC: 6 G/DL (ref 6–8.5)
RBC # BLD AUTO: 3.94 10*6/MM3 (ref 3.77–5.28)
SODIUM BLD-SCNC: 142 MMOL/L (ref 136–145)
WBC NRBC COR # BLD: 10.98 10*3/MM3 (ref 3.4–10.8)

## 2019-03-03 PROCEDURE — 85025 COMPLETE CBC W/AUTO DIFF WBC: CPT | Performed by: INTERNAL MEDICINE

## 2019-03-03 PROCEDURE — 93010 ELECTROCARDIOGRAM REPORT: CPT | Performed by: INTERNAL MEDICINE

## 2019-03-03 PROCEDURE — 93005 ELECTROCARDIOGRAM TRACING: CPT | Performed by: INTERNAL MEDICINE

## 2019-03-03 PROCEDURE — 25010000002 HYDROMORPHONE PER 4 MG: Performed by: INTERNAL MEDICINE

## 2019-03-03 PROCEDURE — 80053 COMPREHEN METABOLIC PANEL: CPT | Performed by: INTERNAL MEDICINE

## 2019-03-03 RX ORDER — ZOLPIDEM TARTRATE 5 MG/1
5 TABLET ORAL NIGHTLY PRN
Status: DISCONTINUED | OUTPATIENT
Start: 2019-03-03 | End: 2019-03-08 | Stop reason: HOSPADM

## 2019-03-03 RX ORDER — ONDANSETRON 2 MG/ML
4 INJECTION INTRAMUSCULAR; INTRAVENOUS EVERY 6 HOURS PRN
Status: DISCONTINUED | OUTPATIENT
Start: 2019-03-03 | End: 2019-03-08 | Stop reason: HOSPADM

## 2019-03-03 RX ORDER — ONDANSETRON 4 MG/1
4 TABLET, ORALLY DISINTEGRATING ORAL EVERY 6 HOURS PRN
Status: DISCONTINUED | OUTPATIENT
Start: 2019-03-03 | End: 2019-03-08 | Stop reason: HOSPADM

## 2019-03-03 RX ORDER — ONDANSETRON 4 MG/1
4 TABLET, FILM COATED ORAL EVERY 6 HOURS PRN
Status: DISCONTINUED | OUTPATIENT
Start: 2019-03-03 | End: 2019-03-08 | Stop reason: HOSPADM

## 2019-03-03 RX ORDER — L.ACID,PARA/B.BIFIDUM/S.THERM 8B CELL
1 CAPSULE ORAL DAILY
Status: DISCONTINUED | OUTPATIENT
Start: 2019-03-03 | End: 2019-03-08 | Stop reason: HOSPADM

## 2019-03-03 RX ORDER — SODIUM CHLORIDE 9 MG/ML
75 INJECTION, SOLUTION INTRAVENOUS CONTINUOUS
Status: DISCONTINUED | OUTPATIENT
Start: 2019-03-03 | End: 2019-03-08 | Stop reason: HOSPADM

## 2019-03-03 RX ORDER — SODIUM CHLORIDE 0.9 % (FLUSH) 0.9 %
3-10 SYRINGE (ML) INJECTION AS NEEDED
Status: DISCONTINUED | OUTPATIENT
Start: 2019-03-03 | End: 2019-03-08 | Stop reason: HOSPADM

## 2019-03-03 RX ORDER — NALOXONE HCL 0.4 MG/ML
0.4 VIAL (ML) INJECTION
Status: DISCONTINUED | OUTPATIENT
Start: 2019-03-03 | End: 2019-03-08 | Stop reason: HOSPADM

## 2019-03-03 RX ORDER — SODIUM CHLORIDE 0.9 % (FLUSH) 0.9 %
3 SYRINGE (ML) INJECTION EVERY 12 HOURS SCHEDULED
Status: DISCONTINUED | OUTPATIENT
Start: 2019-03-03 | End: 2019-03-08 | Stop reason: HOSPADM

## 2019-03-03 RX ORDER — SENNA AND DOCUSATE SODIUM 50; 8.6 MG/1; MG/1
2 TABLET, FILM COATED ORAL 2 TIMES DAILY PRN
Status: DISCONTINUED | OUTPATIENT
Start: 2019-03-03 | End: 2019-03-08 | Stop reason: HOSPADM

## 2019-03-03 RX ORDER — ACETAMINOPHEN 325 MG/1
650 TABLET ORAL EVERY 4 HOURS PRN
Status: DISCONTINUED | OUTPATIENT
Start: 2019-03-03 | End: 2019-03-08 | Stop reason: HOSPADM

## 2019-03-03 RX ORDER — HYDROCODONE BITARTRATE AND ACETAMINOPHEN 5; 325 MG/1; MG/1
1 TABLET ORAL EVERY 4 HOURS PRN
Status: DISCONTINUED | OUTPATIENT
Start: 2019-03-03 | End: 2019-03-04 | Stop reason: SDUPTHER

## 2019-03-03 RX ORDER — HYDROMORPHONE HYDROCHLORIDE 1 MG/ML
0.5 INJECTION, SOLUTION INTRAMUSCULAR; INTRAVENOUS; SUBCUTANEOUS
Status: DISCONTINUED | OUTPATIENT
Start: 2019-03-03 | End: 2019-03-08 | Stop reason: HOSPADM

## 2019-03-03 RX ADMIN — HYDROCODONE BITARTRATE AND ACETAMINOPHEN 1 TABLET: 5; 325 TABLET ORAL at 15:40

## 2019-03-03 RX ADMIN — SODIUM CHLORIDE, PRESERVATIVE FREE 3 ML: 5 INJECTION INTRAVENOUS at 07:55

## 2019-03-03 RX ADMIN — HYDROMORPHONE HYDROCHLORIDE 0.5 MG: 1 INJECTION, SOLUTION INTRAMUSCULAR; INTRAVENOUS; SUBCUTANEOUS at 01:27

## 2019-03-03 RX ADMIN — HYDROMORPHONE HYDROCHLORIDE 0.5 MG: 1 INJECTION, SOLUTION INTRAMUSCULAR; INTRAVENOUS; SUBCUTANEOUS at 19:37

## 2019-03-03 RX ADMIN — HYDROCODONE BITARTRATE AND ACETAMINOPHEN 1 TABLET: 5; 325 TABLET ORAL at 21:16

## 2019-03-03 RX ADMIN — HYDROMORPHONE HYDROCHLORIDE 0.5 MG: 1 INJECTION, SOLUTION INTRAMUSCULAR; INTRAVENOUS; SUBCUTANEOUS at 08:22

## 2019-03-03 RX ADMIN — HYDROMORPHONE HYDROCHLORIDE 0.5 MG: 1 INJECTION, SOLUTION INTRAMUSCULAR; INTRAVENOUS; SUBCUTANEOUS at 10:28

## 2019-03-03 NOTE — ED PROVIDER NOTES
EMERGENCY DEPARTMENT ENCOUNTER    CHIEF COMPLAINT  Chief Complaint: right hip pain  History given by: patient; EMS  History limited by: nothing  Room Number: 08/08  PMD: Bobby Jimenez MD      HPI:  Pt is a 79 y.o. female who presents complaining of right hip pain that began PTA s/p a mechanical fall down two stairs. Patient reports that she struck the back of head and sustained an abrasion to the occipital scalp but denies LOC or any other sustaining injuries. Patient denies currently being on any anticoagulants. EMS reports that the patient was given Fentanyl while en route to the ER without improvement of pain. There are no other complaints at this time.    Duration: began PTA  Onset: sudden  Timing: constant  Location: right hip  Radiation: none specified  Quality: pain  Intensity/Severity: moderate  Progression: not specified  Associated Symptoms: abrasion to the occipital scalp  Aggravating Factors: none specified  Alleviating Factors: none specified  Previous Episodes: none specified  Treatment before arrival: EMS reports that the patient was given Fentanyl while en route to the ER without improvement of pain.    PAST MEDICAL HISTORY  Active Ambulatory Problems     Diagnosis Date Noted   • Neurodermatitis 06/04/2016   • Insomnia 05/10/2017   • Viral upper respiratory tract infection 04/08/2018     Resolved Ambulatory Problems     Diagnosis Date Noted   • No Resolved Ambulatory Problems     Past Medical History:   Diagnosis Date   • Insomnia        PAST SURGICAL HISTORY  History reviewed. No pertinent surgical history.    FAMILY HISTORY  History reviewed. No pertinent family history.    SOCIAL HISTORY  Social History     Socioeconomic History   • Marital status:      Spouse name: Not on file   • Number of children: Not on file   • Years of education: Not on file   • Highest education level: Not on file   Social Needs   • Financial resource strain: Not on file   • Food insecurity - worry: Not on  file   • Food insecurity - inability: Not on file   • Transportation needs - medical: Not on file   • Transportation needs - non-medical: Not on file   Occupational History   • Not on file   Tobacco Use   • Smoking status: Never Smoker   Substance and Sexual Activity   • Alcohol use: Yes   • Drug use: Defer   • Sexual activity: Defer   Other Topics Concern   • Not on file   Social History Narrative   • Not on file       ALLERGIES  Quinine derivatives and Sulfa antibiotics    REVIEW OF SYSTEMS  Review of Systems   Constitutional: Negative for fever.   HENT: Negative for sore throat.    Eyes: Negative.    Respiratory: Negative for cough and shortness of breath.    Cardiovascular: Negative for chest pain.   Gastrointestinal: Negative for abdominal pain, diarrhea and vomiting.   Genitourinary: Negative for dysuria.   Musculoskeletal: Positive for arthralgias (right hip). Negative for neck pain.   Skin: Positive for wound (abrasion to the occipital scalp). Negative for rash.   Neurological: Negative for syncope, weakness, numbness and headaches.   Hematological: Negative.    Psychiatric/Behavioral: Negative.    All other systems reviewed and are negative.      PHYSICAL EXAM  ED Triage Vitals [03/02/19 2143]   Temp Heart Rate Resp BP SpO2   96.3 °F (35.7 °C) 91 18 157/94 100 %      Temp src Heart Rate Source Patient Position BP Location FiO2 (%)   Tympanic Monitor -- -- --       Physical Exam   Constitutional: She is oriented to person, place, and time. No distress.   HENT:   Head: Normocephalic. Head is without raccoon's eyes and without Gregorio's sign.   Right Ear: No hemotympanum.   Left Ear: No hemotympanum.   Nose: No nasal septal hematoma.   There is blood in the patient's hair. Abrasion to the occipital scalp.   Eyes: EOM are normal. Pupils are equal, round, and reactive to light.   Neck: Normal range of motion. Neck supple. No spinous process tenderness and no muscular tenderness present.   Cardiovascular: Normal  rate, regular rhythm, normal heart sounds and intact distal pulses.   Pulmonary/Chest: Effort normal and breath sounds normal. No respiratory distress.   Abdominal: Soft. There is no tenderness. There is no rebound and no guarding.   Musculoskeletal: She exhibits no edema.        Cervical back: She exhibits no tenderness.        Thoracic back: She exhibits no tenderness.        Lumbar back: She exhibits no tenderness.   Right leg is shortened and externally rotated.   Neurological: She is alert and oriented to person, place, and time. She has normal sensation and normal strength. No sensory deficit. GCS score is 15.   Skin: Skin is warm and dry. No rash noted.   Psychiatric: Mood and affect normal.   Nursing note and vitals reviewed.      LAB RESULTS  Lab Results (last 24 hours)     Procedure Component Value Units Date/Time    CBC & Differential [[id number]] Collected:  03/02/19 2213    Specimen:  Blood Updated:  03/02/19 2234    Narrative:       The following orders were created for panel order CBC & Differential.  Procedure                               Abnormality         Status                     ---------                               -----------         ------                     CBC Auto Differential[[id number]]        Abnormal            Final result                 Please view results for these tests on the individual orders.    Comprehensive Metabolic Panel [[id number]]  (Abnormal) Collected:  03/02/19 2213    Specimen:  Blood Updated:  03/02/19 2248     Glucose 108 mg/dL      BUN 13 mg/dL      Creatinine 0.59 mg/dL      Sodium 140 mmol/L      Potassium 3.7 mmol/L      Chloride 100 mmol/L      CO2 26.5 mmol/L      Calcium 8.9 mg/dL      Total Protein 6.9 g/dL      Albumin 4.40 g/dL      ALT (SGPT) 31 U/L      AST (SGOT) 50 U/L      Alkaline Phosphatase 67 U/L      Total Bilirubin 0.2 mg/dL      eGFR Non African Amer 98 mL/min/1.73      Globulin 2.5 gm/dL      A/G Ratio 1.8 g/dL      BUN/Creatinine Ratio 22.0      Anion Gap 13.5 mmol/L     Narrative:       The MDRD GFR formula is only valid for adults with stable renal function between ages 18 and 70.    Protime-INR [[id number]]  (Normal) Collected:  03/02/19 2213    Specimen:  Blood Updated:  03/02/19 2242     Protime 13.4 Seconds      INR 1.04    aPTT [[id number]]  (Normal) Collected:  03/02/19 2213    Specimen:  Blood Updated:  03/02/19 2242     PTT 23.9 seconds     CBC Auto Differential [[id number]]  (Abnormal) Collected:  03/02/19 2213    Specimen:  Blood Updated:  03/02/19 2234     WBC 14.82 10*3/mm3      RBC 4.49 10*6/mm3      Hemoglobin 13.7 g/dL      Hematocrit 42.7 %      MCV 95.1 fL      MCH 30.5 pg      MCHC 32.1 g/dL      RDW 13.2 %      RDW-SD 46.5 fl      MPV 9.6 fL      Platelets 326 10*3/mm3      Neutrophil % 85.0 %      Lymphocyte % 8.0 %      Monocyte % 5.8 %      Eosinophil % 0.2 %      Basophil % 0.3 %      Immature Grans % 0.7 %      Neutrophils, Absolute 12.60 10*3/mm3      Lymphocytes, Absolute 1.19 10*3/mm3      Monocytes, Absolute 0.86 10*3/mm3      Eosinophils, Absolute 0.03 10*3/mm3      Basophils, Absolute 0.04 10*3/mm3      Immature Grans, Absolute 0.10 10*3/mm3      nRBC 0.0 /100 WBC           I ordered the above labs and reviewed the results    RADIOLOGY  XR Chest 1 View   Final Result   Cardiomegaly. No acute infiltrates.       This report was finalized on 3/2/2019 11:00 PM by Dr. Padmini Rosenbaum M.D.          XR Hip With or Without Pelvis 2 - 3 View Right   Final Result   Right femoral neck fracture.       This report was finalized on 3/2/2019 10:59 PM by Dr. Padmini Rosenbaum M.D.          CT Head Without Contrast   Final Result   No acute intracranial hemorrhage, although the patient does have a right   parieto-occipital soft tissue hematoma/laceration       Radiation dose reduction techniques were utilized, including automated   exposure control and exposure modulation based on body size.       This report was finalized on 3/2/2019  10:31 PM by Dr. Padmini Rosenbaum M.D.               I ordered the above noted radiological studies. Interpreted by radiologist. Reviewed by me in PACS.       PROCEDURES  Procedures      PROGRESS AND CONSULTS  2159 Ordered CT Head, L Hip XR, CXR, and blood work for further evaluation. Also  Ordered Morphine for pain, Zofran for nausea, IV Fluids for hydration, and Tdap.    2253 Rechecked the patient who is resting comfortably and in NAD. Vital signs are stable. BP- 157/94 HR- 91 Temp- 96.3 °F (35.7 °C) (Tympanic) O2 sat- 100%. Informed the patient of her CT Head that shows nothing acute but her R Hip XR that shows a femoral neck fracture. Discussed the plan for admission for further evaluation and management. Pt understands and agrees with the plan, all questions answered.    2225 Placed call to Ortho for consult and A for admission.    2311 Received a call from Dr. Joesph Cruz (ortho) and discussed pt's case. Dr. Cruz agreed with plan to consult.    2312 Received a call from Dr. Syed, [facility], and discussed pt's case. Dr. Syed agreed with plan to admit the patient to med/surg for further evaluation and management.    MEDICAL DECISION MAKING  Results were reviewed/discussed with the patient and they were also made aware of online access. Pt also made aware that some labs, such as cultures, will not be resulted during ER visit and follow up with PMD is necessary.     MDM  Number of Diagnoses or Management Options     Amount and/or Complexity of Data Reviewed  Clinical lab tests: ordered and reviewed (INR: 1.04)  Tests in the radiology section of CPT®: ordered and reviewed (Ct Head Without Contrast    Result Date: 3/2/2019  Narrative: CT OF THE HEAD WITHOUT CONTRAST  HISTORY: Fall with head trauma  COMPARISON: April 11, 2012  TECHNIQUE: Axial CT imaging was obtained from the vertex of the skull to the skull base. No IV contrast was administered.  FINDINGS: There is diffuse cerebral atrophy, with  compensatory ventricular dilatation, in keeping with the age of 79. No acute intracranial hemorrhage is seen. There is periventricular deep white matter microangiopathic change. There is no midline shift or mass effect. The visualized paranasal sinuses and mastoid air cells appear clear, with exception of mucous retention cyst within the left sphenoid sinus. No calvarial fracture is seen. There is a right parieto-occipital soft tissue hematoma/laceration. Again, no underlying calvarial fracture is seen.      Impression: No acute intracranial hemorrhage, although the patient does have a right parieto-occipital soft tissue hematoma/laceration  Radiation dose reduction techniques were utilized, including automated exposure control and exposure modulation based on body size.  This report was finalized on 3/2/2019 10:31 PM by Dr. Padmini Rosenbaum M.D.      Xr Chest 1 View    Result Date: 3/2/2019  Narrative: PORTABLE CHEST RADIOGRAPH  HISTORY: Preoperative examination. Patient fell today.  COMPARISON: February 13, 2006  FINDINGS: Cardiomegaly is identified. There is no evidence of vascular congestion. There is tortuosity and ectasia of the thoracic aorta. No pneumothorax or pleural effusion is seen. There is some mild bibasilar scarring.      Impression: Cardiomegaly. No acute infiltrates.  This report was finalized on 3/2/2019 11:00 PM by Dr. Padmini Rosenbaum M.D.      Xr Hip With Or Without Pelvis 2 - 3 View Right    Result Date: 3/2/2019  Narrative: 3 VIEWS RIGHT HIP  HISTORY: Right hip pain after a fall  COMPARISON: None available.  FINDINGS: This patient has a right femoral neck fracture. It does appear to be comminuted. Proximal femur appears displaced superiorly. No fracture is seen on the left. No additional fractures are seen.      Impression: Right femoral neck fracture.  This report was finalized on 3/2/2019 10:59 PM by Dr. Padmini Rosenbaum M.D. )  Decide to obtain previous medical records or to obtain  history from someone other than the patient: yes (Epic)  Obtain history from someone other than the patient: yes (EMS)  Review and summarize past medical records: yes (The patient has no pertinent recent records in Epic.)  Discuss the patient with other providers: yes (Dr. Joesph Cruz (ortho) and Dr. Syed ([facility]))  Independent visualization of images, tracings, or specimens: yes    Patient Progress  Patient progress: stable         DIAGNOSIS  Final diagnoses:   Closed fracture of neck of right femur, initial encounter (CMS/[facility])       DISPOSITION  ADMISSION TO MED/SURG    Discussed treatment plan and reason for admission with pt/family and admitting physician.  Pt/family voiced understanding of the plan for admission for further testing/treatment as needed.    Latest Documented Vital Signs:  As of 11:14 PM  BP- 157/94 HR- 91 Temp- 96.3 °F (35.7 °C) (Tympanic) O2 sat- 100%    --  Documentation assistance provided by dain Neal for Dr. Chano MD.  Information recorded by the scribe was done at my direction and has been verified and validated by me.       Melvi Neal  03/02/19 9536       Kristopher Madden MD  03/02/19 2879

## 2019-03-03 NOTE — CONSULTS
Referring Provider: Dr. Pereyra  Reason for Consultation: Right hip pain after fall    Patient Care Team:  Bobby Jimenez MD as PCP - General  Bobby Jimenez MD as PCP - Family Medicine  [facility] - Claims Attributed    Chief complaint right hip pain    Subjective .     History of present illness:  Patient is a very alert and high functioning 79-year-old woman who sustained a fall at home yesterday.  She complained of pain in her low back and right hip and is not able to bear weight.  Radiographs revealed a fracture of the right femoral neck.  She has no history of previous problems with this right hip.          Objective         Physical Exam:   Patient is alert and is a good historian.  Right lower extremity is slightly shortened and internally rotated.  There is no visible contusion or abrasion over the right hip.  She is neurovascularly intact.    Results Review:   I reviewed the patient's new clinical results.  I reviewed the patient's new imaging results and agree with the interpretation.    DIAGNOSIS  Right displaced femoral neck fracture      Assessment/Plan   She would benefit from a right total hip arthroplasty.  She is high functioning and thin and would fare better with a total hip rather than a bipolar hip.  I discussed this with her today.  I will also discuss this with one of my partners, Jayjay Gillespie may perform the surgery tomorrow.  We also discussed the need for postop rehabilitation and a probable stay in an extended care facility.  The patient cares for her  who has numerous medical problems and she is concerned with his well-being also.    I discussed the patients findings and my recommendations with patient    Joesph Cruz MD  03/03/19  8:23 AM    Time: More than 50% of time spent in counseling and coordination of care:  Total face-to-face/floor time 10 min.  Time spent in counseling 10 min. Counseling included the following topics: 10

## 2019-03-03 NOTE — PLAN OF CARE
Problem: Patient Care Overview  Goal: Plan of Care Review  Outcome: Ongoing (interventions implemented as appropriate)   03/03/19 9136   Coping/Psychosocial   Plan of Care Reviewed With patient   Plan of Care Review   Progress no change   OTHER   Outcome Summary Pt admitted from the ER with a diagnosis of rt femur fracture. Pt arrived to the unit at 0015. Pt fell at home down some stairs. Pt had some dried up blood in her hair from an abrasion to the back of the head. No loss of consiousness noted. CT of head done. No acute changes. Pt rt leg is shortened and externally rotated. Ortho consult called in to Dr Cruz. Pt is currently NPO in the anticipation of surgery in AM. Pt continues with the Accumax and waffle boots. Pt has a Pure Wick in place. Refused SCDs this shift. Pt continues with IV pain meds that provide relief. Pt is resting at this time, will contiue to monitor..     Goal: Individualization and Mutuality  Outcome: Ongoing (interventions implemented as appropriate)    Goal: Discharge Needs Assessment  Outcome: Ongoing (interventions implemented as appropriate)    Goal: Interprofessional Rounds/Family Conf  Outcome: Ongoing (interventions implemented as appropriate)      Problem: Skin Injury Risk (Adult)  Goal: Identify Related Risk Factors and Signs and Symptoms  Outcome: Ongoing (interventions implemented as appropriate)    Goal: Skin Health and Integrity  Outcome: Ongoing (interventions implemented as appropriate)      Problem: Fracture Orthopaedic (Adult)  Goal: Signs and Symptoms of Listed Potential Problems Will be Absent, Minimized or Managed (Fracture Orthopaedic)  Outcome: Ongoing (interventions implemented as appropriate)

## 2019-03-03 NOTE — ED NOTES
Pt has a surface level of abrasion to the back of head. Wound washed with sterile water for Dr. Madden to observe. Per Dr. Madden applied kerlix and antibiotic ointment.      Allegra Ramos  03/02/19 9803

## 2019-03-03 NOTE — PLAN OF CARE
Problem: Patient Care Overview  Goal: Plan of Care Review  Outcome: Ongoing (interventions implemented as appropriate)   03/03/19 0344 03/03/19 1746   Coping/Psychosocial   Plan of Care Reviewed With patient --    Plan of Care Review   Progress no change --    OTHER   Outcome Summary --  Pt awaiting surgical intervention of R hip tomorrow. VSS. Pt voiding per leatha. Pt educated on bp monitoring d/t hx of HTN. Will cont to monitor.

## 2019-03-03 NOTE — H&P
Name: Samantha Davila ADMIT: 3/2/2019   : 1940  PCP: Bobby Jimenez MD    MRN: [id number] LOS: 1 days   AGE/SEX: 79 y.o. female  ROOM: [facility]/     Chief Complaint   Patient presents with   • Hip Injury     Fall from 2nd step from bottom stair; Right sided hip pain; Shortened and rotated.  Pt denies previous trauma.  No LOC; No blood thinners; Did strike her face; Laceration to lip.    • Fall       Subjective   Ms. Davila is a 79 y.o. female with a history of insomnia who presents to [facility] with fall off of the second bottom step going down to her basement.  She reports she does not really recall the events leading up to the fall but she did fall land on the floor and hit her head.  She then could not stand up and had severe right hip pain.  She, slid sideways and her  try to come down to help her and was still unable to get up.  She reports no chest pain or palpitations.  No dyspnea or cough.  Pain is located in her right hip and is nonradiating.  Severe in quality worse with movement and improved with medication.  She also reports some right knee and ankle pain but those are mild compared to her hip.    History of Present Illness    Past Medical History:   Diagnosis Date   • Insomnia      History reviewed. No pertinent surgical history.  History reviewed. No pertinent family history.  Social History     Tobacco Use   • Smoking status: Never Smoker   Substance Use Topics   • Alcohol use: Yes   • Drug use: Defer     Medications Prior to Admission   Medication Sig Dispense Refill Last Dose   • Budesonide (ENTOCORT EC) 3 MG 24 hr capsule TAKE 3 CS PO QD  12 3/2/2019 at 0900   • cetirizine-pseudoephedrine (ALL DAY ALLERGY-D) 5-120 MG per 12 hr tablet Take 1 tablet by mouth 2 (Two) Times a Day. 60 tablet 6 3/2/2019 at 1600   • Cholecalciferol (VITAMIN D3) 2000 units tablet Take  by mouth.   3/2/2019 at 0900   • Multiple Vitamins-Minerals (WOMENS 50+ MULTI VITAMIN/MIN PO) Take  by  mouth.   3/2/2019 at 0900   • Probiotic Product (PROBIOTIC DAILY PO) Take  by mouth.   3/1/2019 at 0900   • zolpidem (AMBIEN) 10 MG tablet TAKE 1 TABLET BY MOUTH EVERY NIGHT AT BEDTIME AS NEEDED FOR SLEEP 30 tablet 0 3/1/2019 at 2100     Allergies:    Allergies   Allergen Reactions   • Quinine Derivatives    • Sulfa Antibiotics        Review of Systems   Constitutional: Negative for chills and fever.   HENT: Negative for sore throat and trouble swallowing.    Eyes: Negative for pain and visual disturbance.   Respiratory: Negative for cough, shortness of breath and wheezing.    Cardiovascular: Negative for chest pain, palpitations and leg swelling.   Gastrointestinal: Negative for constipation, diarrhea, nausea and vomiting.   Endocrine: Negative for cold intolerance and heat intolerance.   Genitourinary: Negative for difficulty urinating and dysuria.   Musculoskeletal: Negative for neck pain and neck stiffness.   Skin: Negative for pallor and rash.   Allergic/Immunologic: Negative for environmental allergies and food allergies.   Neurological: Negative for seizures and syncope.   Hematological: Negative for adenopathy. Does not bruise/bleed easily.   Psychiatric/Behavioral: Negative for agitation and confusion.        Objective    Vital Signs  Temp:  [96.3 °F (35.7 °C)-98.3 °F (36.8 °C)] 98.3 °F (36.8 °C)  Heart Rate:  [79-91] 79  Resp:  [17-18] 18  BP: (125-157)/(70-94) 125/70  SpO2:  [94 %-100 %] 98 %  on  Flow (L/min):  [2] 2;   Device (Oxygen Therapy): nasal cannula  Body mass index is 20.85 kg/m².    Physical Exam   Constitutional: She appears well-developed. No distress.   HENT:   Head: Normocephalic.   Nose: Nose normal.   Dried blood on mouth.   Eyes: Conjunctivae and EOM are normal. Pupils are equal, round, and reactive to light.   Neck: Normal range of motion. Neck supple.   Cardiovascular: Normal rate, regular rhythm and intact distal pulses.   Pulmonary/Chest: Effort normal and breath sounds normal. She  has no wheezes.   Abdominal: Soft. She exhibits no distension. There is no tenderness. There is no rebound and no guarding.   Musculoskeletal: She exhibits tenderness (right hip). She exhibits no edema.   Right leg externally rotated   Neurological: She is alert. No cranial nerve deficit.   Skin: Skin is warm and dry. She is not diaphoretic.   Psychiatric: She has a normal mood and affect. Her behavior is normal.   Nursing note and vitals reviewed.      Results Review:  I reviewed the patient's new clinical results.  I reviewed imaging, agree with interpretation.  I reviewed EKG/telemetry, sinus rhythm.  I reviewed prior records.    Lab Results (last 24 hours)     Procedure Component Value Units Date/Time    CBC & Differential [[id number]] Collected:  03/02/19 2213    Specimen:  Blood Updated:  03/02/19 2234    Narrative:       The following orders were created for panel order CBC & Differential.  Procedure                               Abnormality         Status                     ---------                               -----------         ------                     CBC Auto Differential[[id number]]        Abnormal            Final result                 Please view results for these tests on the individual orders.    Comprehensive Metabolic Panel [[id number]]  (Abnormal) Collected:  03/02/19 2213    Specimen:  Blood Updated:  03/02/19 2248     Glucose 108 mg/dL      BUN 13 mg/dL      Creatinine 0.59 mg/dL      Sodium 140 mmol/L      Potassium 3.7 mmol/L      Chloride 100 mmol/L      CO2 26.5 mmol/L      Calcium 8.9 mg/dL      Total Protein 6.9 g/dL      Albumin 4.40 g/dL      ALT (SGPT) 31 U/L      AST (SGOT) 50 U/L      Alkaline Phosphatase 67 U/L      Total Bilirubin 0.2 mg/dL      eGFR Non African Amer 98 mL/min/1.73      Globulin 2.5 gm/dL      A/G Ratio 1.8 g/dL      BUN/Creatinine Ratio 22.0     Anion Gap 13.5 mmol/L     Narrative:       The MDRD GFR formula is only valid for adults with stable renal function  between ages 18 and 70.    Protime-INR [[id number]]  (Normal) Collected:  03/02/19 2213    Specimen:  Blood Updated:  03/02/19 2242     Protime 13.4 Seconds      INR 1.04    aPTT [[id number]]  (Normal) Collected:  03/02/19 2213    Specimen:  Blood Updated:  03/02/19 2242     PTT 23.9 seconds     CBC Auto Differential [[id number]]  (Abnormal) Collected:  03/02/19 2213    Specimen:  Blood Updated:  03/02/19 2234     WBC 14.82 10*3/mm3      RBC 4.49 10*6/mm3      Hemoglobin 13.7 g/dL      Hematocrit 42.7 %      MCV 95.1 fL      MCH 30.5 pg      MCHC 32.1 g/dL      RDW 13.2 %      RDW-SD 46.5 fl      MPV 9.6 fL      Platelets 326 10*3/mm3      Neutrophil % 85.0 %      Lymphocyte % 8.0 %      Monocyte % 5.8 %      Eosinophil % 0.2 %      Basophil % 0.3 %      Immature Grans % 0.7 %      Neutrophils, Absolute 12.60 10*3/mm3      Lymphocytes, Absolute 1.19 10*3/mm3      Monocytes, Absolute 0.86 10*3/mm3      Eosinophils, Absolute 0.03 10*3/mm3      Basophils, Absolute 0.04 10*3/mm3      Immature Grans, Absolute 0.10 10*3/mm3      nRBC 0.0 /100 WBC           XR Chest 1 View   Final Result   Cardiomegaly. No acute infiltrates.       This report was finalized on 3/2/2019 11:00 PM by Dr. Padmini Rosenbaum M.D.          XR Hip With or Without Pelvis 2 - 3 View Right   Final Result   Right femoral neck fracture.       This report was finalized on 3/2/2019 10:59 PM by Dr. Padmini Rosenbaum M.D.          CT Head Without Contrast   Final Result   No acute intracranial hemorrhage, although the patient does have a right   parieto-occipital soft tissue hematoma/laceration       Radiation dose reduction techniques were utilized, including automated   exposure control and exposure modulation based on body size.       This report was finalized on 3/2/2019 10:31 PM by Dr. Padmini Rosenbaum M.D.            Assessment/Plan      Active Hospital Problems    Diagnosis Date Noted   • **Closed fracture of neck of right femur (CMS/HCC)  [S72.001A] 03/02/2019   • Fall [W19.XXXA] 03/03/2019   • Insomnia [G47.00] 05/10/2017      Resolved Hospital Problems   No resolved problems to display.     · Right femur fracture: We will give pain control and start bowel regimen.  Give fluids.  Check EKG for preop baseline.  Consult orthopedic surgery.  · Fall  · Insomnia: We will continue her home Ambien.  · Reflexes: SCD  · Full code    I discussed the patients findings and my recommendations with patient, nursing staff and consulting provider.    Trever Syed MD  [facility]  03/03/19  12:43 AM

## 2019-03-03 NOTE — PROGRESS NOTES
Continued Stay Note  [facility]     Patient Name: Samantha Davila  MRN: [id number]  Today's Date: 3/3/2019    Admit Date: 3/2/2019    Discharge Plan     Row Name 03/03/19 0873       Plan    Plan  Pt. in pain when CCP went to meet with her, stated ok to speak with CCP. Spoke briefly and then she did not feel like speaking any longer, follow-up after surgery for plan/needs...........Eugenia TEJEDA      Plan Comments  Limited screen done on patient. Pt. lives with spouse and was mostly IADL's prior to admit. States she and spouse both take care of each other. Pt. states she is unsure what needs will be post-op and cannot really answer any questions regarding that until after surgery. Screening limited due to patient's pain, CCP will follow-up after surgery..........Eugenia RN         Discharge Codes    No documentation.             Pia Orr, RN

## 2019-03-03 NOTE — NURSING NOTE
Pt has some dried blood on her lips and gums, pt also has a broken front tooth, as per patient the broken tooth happened a while ago. Not associated with this fall.

## 2019-03-04 ENCOUNTER — APPOINTMENT (OUTPATIENT)
Dept: GENERAL RADIOLOGY | Facility: HOSPITAL | Age: 79
End: 2019-03-04

## 2019-03-04 ENCOUNTER — ANESTHESIA EVENT (OUTPATIENT)
Dept: PERIOP | Facility: HOSPITAL | Age: 79
End: 2019-03-04

## 2019-03-04 ENCOUNTER — ANESTHESIA (OUTPATIENT)
Dept: PERIOP | Facility: HOSPITAL | Age: 79
End: 2019-03-04

## 2019-03-04 PROCEDURE — 72170 X-RAY EXAM OF PELVIS: CPT

## 2019-03-04 PROCEDURE — C1776 JOINT DEVICE (IMPLANTABLE): HCPCS | Performed by: ORTHOPAEDIC SURGERY

## 2019-03-04 PROCEDURE — 25010000002 PROPOFOL 10 MG/ML EMULSION: Performed by: NURSE ANESTHETIST, CERTIFIED REGISTERED

## 2019-03-04 PROCEDURE — 25010000002 FENTANYL CITRATE (PF) 100 MCG/2ML SOLUTION: Performed by: NURSE ANESTHETIST, CERTIFIED REGISTERED

## 2019-03-04 PROCEDURE — 0SR906Z REPLACEMENT OF RIGHT HIP JOINT WITH OXIDIZED ZIRCONIUM ON POLYETHYLENE SYNTHETIC SUBSTITUTE, OPEN APPROACH: ICD-10-PCS | Performed by: ORTHOPAEDIC SURGERY

## 2019-03-04 PROCEDURE — 25010000002 NEOSTIGMINE PER 0.5 MG: Performed by: NURSE ANESTHETIST, CERTIFIED REGISTERED

## 2019-03-04 PROCEDURE — 25010000002 ONDANSETRON PER 1 MG: Performed by: NURSE ANESTHETIST, CERTIFIED REGISTERED

## 2019-03-04 PROCEDURE — C9290 INJ, BUPIVACAINE LIPOSOME: HCPCS | Performed by: ORTHOPAEDIC SURGERY

## 2019-03-04 PROCEDURE — 73501 X-RAY EXAM HIP UNI 1 VIEW: CPT

## 2019-03-04 PROCEDURE — 25010000002 DEXAMETHASONE PER 1 MG: Performed by: NURSE ANESTHETIST, CERTIFIED REGISTERED

## 2019-03-04 PROCEDURE — 73560 X-RAY EXAM OF KNEE 1 OR 2: CPT

## 2019-03-04 PROCEDURE — 25010000003 BUPIVACAINE LIPOSOME 1.3 % SUSPENSION 20 ML VIAL: Performed by: ORTHOPAEDIC SURGERY

## 2019-03-04 PROCEDURE — 25010000002 HYDROMORPHONE PER 4 MG: Performed by: INTERNAL MEDICINE

## 2019-03-04 PROCEDURE — 25010000002 FENTANYL CITRATE (PF) 100 MCG/2ML SOLUTION: Performed by: ANESTHESIOLOGY

## 2019-03-04 PROCEDURE — 25010000003 CEFAZOLIN IN DEXTROSE 2-4 GM/100ML-% SOLUTION: Performed by: ORTHOPAEDIC SURGERY

## 2019-03-04 PROCEDURE — 76000 FLUOROSCOPY <1 HR PHYS/QHP: CPT

## 2019-03-04 DEVICE — POLARSTEM STANDARD NON-CEMENTED                                    WITH TI/HA 1
Type: IMPLANTABLE DEVICE | Site: ACETABULUM | Status: FUNCTIONAL
Brand: POLARSTEM

## 2019-03-04 DEVICE — IMPLANTABLE DEVICE: Type: IMPLANTABLE DEVICE | Site: ACETABULUM | Status: FUNCTIONAL

## 2019-03-04 DEVICE — R3 0 DEGREE XLPE ACETABULAR LINER                                    32MM ID X OD 50MM
Type: IMPLANTABLE DEVICE | Site: ACETABULUM | Status: FUNCTIONAL
Brand: R3

## 2019-03-04 DEVICE — OXINIUM FEMORAL HEAD 12/14 TAPER                                    32MM -3
Type: IMPLANTABLE DEVICE | Site: ACETABULUM | Status: FUNCTIONAL
Brand: OXINIUM

## 2019-03-04 DEVICE — R3 3 HOLE ACETABULAR SHELL 50MM
Type: IMPLANTABLE DEVICE | Site: ACETABULUM | Status: FUNCTIONAL
Brand: R3 ACETABULAR

## 2019-03-04 RX ORDER — CEFAZOLIN SODIUM 2 G/100ML
2 INJECTION, SOLUTION INTRAVENOUS EVERY 8 HOURS
Status: COMPLETED | OUTPATIENT
Start: 2019-03-05 | End: 2019-03-05

## 2019-03-04 RX ORDER — BUDESONIDE 3 MG/1
6 CAPSULE, COATED PELLETS ORAL DAILY
Status: DISCONTINUED | OUTPATIENT
Start: 2019-03-04 | End: 2019-03-08 | Stop reason: HOSPADM

## 2019-03-04 RX ORDER — NALOXONE HCL 0.4 MG/ML
0.2 VIAL (ML) INJECTION AS NEEDED
Status: DISCONTINUED | OUTPATIENT
Start: 2019-03-04 | End: 2019-03-04 | Stop reason: HOSPADM

## 2019-03-04 RX ORDER — ONDANSETRON 2 MG/ML
4 INJECTION INTRAMUSCULAR; INTRAVENOUS ONCE AS NEEDED
Status: DISCONTINUED | OUTPATIENT
Start: 2019-03-04 | End: 2019-03-04 | Stop reason: HOSPADM

## 2019-03-04 RX ORDER — FAMOTIDINE 10 MG/ML
20 INJECTION, SOLUTION INTRAVENOUS ONCE
Status: COMPLETED | OUTPATIENT
Start: 2019-03-04 | End: 2019-03-04

## 2019-03-04 RX ORDER — HYDROMORPHONE HYDROCHLORIDE 1 MG/ML
0.25 INJECTION, SOLUTION INTRAMUSCULAR; INTRAVENOUS; SUBCUTANEOUS
Status: DISCONTINUED | OUTPATIENT
Start: 2019-03-04 | End: 2019-03-04 | Stop reason: HOSPADM

## 2019-03-04 RX ORDER — PROMETHAZINE HYDROCHLORIDE 25 MG/ML
12.5 INJECTION, SOLUTION INTRAMUSCULAR; INTRAVENOUS ONCE AS NEEDED
Status: DISCONTINUED | OUTPATIENT
Start: 2019-03-04 | End: 2019-03-04 | Stop reason: HOSPADM

## 2019-03-04 RX ORDER — HYDRALAZINE HYDROCHLORIDE 20 MG/ML
5 INJECTION INTRAMUSCULAR; INTRAVENOUS
Status: DISCONTINUED | OUTPATIENT
Start: 2019-03-04 | End: 2019-03-04 | Stop reason: HOSPADM

## 2019-03-04 RX ORDER — MIDAZOLAM HYDROCHLORIDE 1 MG/ML
1 INJECTION INTRAMUSCULAR; INTRAVENOUS
Status: DISCONTINUED | OUTPATIENT
Start: 2019-03-04 | End: 2019-03-04 | Stop reason: HOSPADM

## 2019-03-04 RX ORDER — SODIUM CHLORIDE, SODIUM LACTATE, POTASSIUM CHLORIDE, CALCIUM CHLORIDE 600; 310; 30; 20 MG/100ML; MG/100ML; MG/100ML; MG/100ML
9 INJECTION, SOLUTION INTRAVENOUS CONTINUOUS
Status: DISCONTINUED | OUTPATIENT
Start: 2019-03-04 | End: 2019-03-08 | Stop reason: HOSPADM

## 2019-03-04 RX ORDER — PROMETHAZINE HYDROCHLORIDE 25 MG/1
25 SUPPOSITORY RECTAL ONCE AS NEEDED
Status: DISCONTINUED | OUTPATIENT
Start: 2019-03-04 | End: 2019-03-04 | Stop reason: HOSPADM

## 2019-03-04 RX ORDER — HYDROCODONE BITARTRATE AND ACETAMINOPHEN 5; 325 MG/1; MG/1
1 TABLET ORAL EVERY 6 HOURS PRN
Status: DISCONTINUED | OUTPATIENT
Start: 2019-03-04 | End: 2019-03-04 | Stop reason: SDUPTHER

## 2019-03-04 RX ORDER — PROMETHAZINE HYDROCHLORIDE 25 MG/1
25 TABLET ORAL ONCE AS NEEDED
Status: DISCONTINUED | OUTPATIENT
Start: 2019-03-04 | End: 2019-03-04 | Stop reason: HOSPADM

## 2019-03-04 RX ORDER — DIPHENHYDRAMINE HYDROCHLORIDE 50 MG/ML
12.5 INJECTION INTRAMUSCULAR; INTRAVENOUS
Status: DISCONTINUED | OUTPATIENT
Start: 2019-03-04 | End: 2019-03-04 | Stop reason: HOSPADM

## 2019-03-04 RX ORDER — TRANEXAMIC ACID 100 MG/ML
INJECTION, SOLUTION INTRAVENOUS AS NEEDED
Status: DISCONTINUED | OUTPATIENT
Start: 2019-03-04 | End: 2019-03-04 | Stop reason: SURG

## 2019-03-04 RX ORDER — ACETAMINOPHEN 325 MG/1
650 TABLET ORAL ONCE AS NEEDED
Status: DISCONTINUED | OUTPATIENT
Start: 2019-03-04 | End: 2019-03-04 | Stop reason: HOSPADM

## 2019-03-04 RX ORDER — DEXAMETHASONE SODIUM PHOSPHATE 10 MG/ML
INJECTION INTRAMUSCULAR; INTRAVENOUS AS NEEDED
Status: DISCONTINUED | OUTPATIENT
Start: 2019-03-04 | End: 2019-03-04 | Stop reason: SURG

## 2019-03-04 RX ORDER — GLYCOPYRROLATE 0.2 MG/ML
INJECTION INTRAMUSCULAR; INTRAVENOUS AS NEEDED
Status: DISCONTINUED | OUTPATIENT
Start: 2019-03-04 | End: 2019-03-04 | Stop reason: SURG

## 2019-03-04 RX ORDER — PROPOFOL 10 MG/ML
VIAL (ML) INTRAVENOUS AS NEEDED
Status: DISCONTINUED | OUTPATIENT
Start: 2019-03-04 | End: 2019-03-04 | Stop reason: SURG

## 2019-03-04 RX ORDER — ONDANSETRON 2 MG/ML
INJECTION INTRAMUSCULAR; INTRAVENOUS AS NEEDED
Status: DISCONTINUED | OUTPATIENT
Start: 2019-03-04 | End: 2019-03-04 | Stop reason: SURG

## 2019-03-04 RX ORDER — MEPERIDINE HYDROCHLORIDE 25 MG/ML
12.5 INJECTION INTRAMUSCULAR; INTRAVENOUS; SUBCUTANEOUS
Status: DISCONTINUED | OUTPATIENT
Start: 2019-03-04 | End: 2019-03-04 | Stop reason: HOSPADM

## 2019-03-04 RX ORDER — ROCURONIUM BROMIDE 10 MG/ML
INJECTION, SOLUTION INTRAVENOUS AS NEEDED
Status: DISCONTINUED | OUTPATIENT
Start: 2019-03-04 | End: 2019-03-04 | Stop reason: SURG

## 2019-03-04 RX ORDER — LIDOCAINE HYDROCHLORIDE 20 MG/ML
INJECTION, SOLUTION INFILTRATION; PERINEURAL AS NEEDED
Status: DISCONTINUED | OUTPATIENT
Start: 2019-03-04 | End: 2019-03-04 | Stop reason: SURG

## 2019-03-04 RX ORDER — WOUND DRESSING ADHESIVE - LIQUID
LIQUID MISCELLANEOUS AS NEEDED
Status: DISCONTINUED | OUTPATIENT
Start: 2019-03-04 | End: 2019-03-04 | Stop reason: HOSPADM

## 2019-03-04 RX ORDER — DIPHENHYDRAMINE HCL 25 MG
25 CAPSULE ORAL
Status: DISCONTINUED | OUTPATIENT
Start: 2019-03-04 | End: 2019-03-04 | Stop reason: HOSPADM

## 2019-03-04 RX ORDER — FENTANYL CITRATE 50 UG/ML
INJECTION, SOLUTION INTRAMUSCULAR; INTRAVENOUS AS NEEDED
Status: DISCONTINUED | OUTPATIENT
Start: 2019-03-04 | End: 2019-03-04 | Stop reason: SURG

## 2019-03-04 RX ORDER — FENTANYL CITRATE 50 UG/ML
25 INJECTION, SOLUTION INTRAMUSCULAR; INTRAVENOUS
Status: DISCONTINUED | OUTPATIENT
Start: 2019-03-04 | End: 2019-03-04 | Stop reason: HOSPADM

## 2019-03-04 RX ORDER — EPHEDRINE SULFATE 50 MG/ML
5 INJECTION, SOLUTION INTRAVENOUS ONCE AS NEEDED
Status: DISCONTINUED | OUTPATIENT
Start: 2019-03-04 | End: 2019-03-04 | Stop reason: HOSPADM

## 2019-03-04 RX ORDER — MIDAZOLAM HYDROCHLORIDE 1 MG/ML
2 INJECTION INTRAMUSCULAR; INTRAVENOUS
Status: DISCONTINUED | OUTPATIENT
Start: 2019-03-04 | End: 2019-03-04 | Stop reason: HOSPADM

## 2019-03-04 RX ORDER — SODIUM CHLORIDE 0.9 % (FLUSH) 0.9 %
1-10 SYRINGE (ML) INJECTION AS NEEDED
Status: DISCONTINUED | OUTPATIENT
Start: 2019-03-04 | End: 2019-03-04 | Stop reason: HOSPADM

## 2019-03-04 RX ORDER — LIDOCAINE HYDROCHLORIDE 10 MG/ML
0.5 INJECTION, SOLUTION EPIDURAL; INFILTRATION; INTRACAUDAL; PERINEURAL ONCE AS NEEDED
Status: DISCONTINUED | OUTPATIENT
Start: 2019-03-04 | End: 2019-03-04 | Stop reason: HOSPADM

## 2019-03-04 RX ORDER — FLUMAZENIL 0.1 MG/ML
0.2 INJECTION INTRAVENOUS AS NEEDED
Status: DISCONTINUED | OUTPATIENT
Start: 2019-03-04 | End: 2019-03-04 | Stop reason: HOSPADM

## 2019-03-04 RX ORDER — MAGNESIUM HYDROXIDE 1200 MG/15ML
LIQUID ORAL AS NEEDED
Status: DISCONTINUED | OUTPATIENT
Start: 2019-03-04 | End: 2019-03-04 | Stop reason: HOSPADM

## 2019-03-04 RX ORDER — HYDROCODONE BITARTRATE AND ACETAMINOPHEN 5; 325 MG/1; MG/1
1 TABLET ORAL EVERY 6 HOURS PRN
Status: DISCONTINUED | OUTPATIENT
Start: 2019-03-04 | End: 2019-03-08 | Stop reason: HOSPADM

## 2019-03-04 RX ORDER — OXYCODONE AND ACETAMINOPHEN 7.5; 325 MG/1; MG/1
1 TABLET ORAL ONCE AS NEEDED
Status: DISCONTINUED | OUTPATIENT
Start: 2019-03-04 | End: 2019-03-04 | Stop reason: HOSPADM

## 2019-03-04 RX ORDER — CEFAZOLIN SODIUM 2 G/100ML
2 INJECTION, SOLUTION INTRAVENOUS ONCE
Status: COMPLETED | OUTPATIENT
Start: 2019-03-04 | End: 2019-03-04

## 2019-03-04 RX ORDER — FENTANYL CITRATE 50 UG/ML
50 INJECTION, SOLUTION INTRAMUSCULAR; INTRAVENOUS
Status: DISCONTINUED | OUTPATIENT
Start: 2019-03-04 | End: 2019-03-04 | Stop reason: HOSPADM

## 2019-03-04 RX ORDER — HYDROCODONE BITARTRATE AND ACETAMINOPHEN 7.5; 325 MG/1; MG/1
1 TABLET ORAL ONCE AS NEEDED
Status: DISCONTINUED | OUTPATIENT
Start: 2019-03-04 | End: 2019-03-04 | Stop reason: HOSPADM

## 2019-03-04 RX ORDER — LABETALOL HYDROCHLORIDE 5 MG/ML
5 INJECTION, SOLUTION INTRAVENOUS
Status: DISCONTINUED | OUTPATIENT
Start: 2019-03-04 | End: 2019-03-04 | Stop reason: HOSPADM

## 2019-03-04 RX ADMIN — FAMOTIDINE 20 MG: 10 INJECTION INTRAVENOUS at 15:46

## 2019-03-04 RX ADMIN — DEXAMETHASONE SODIUM PHOSPHATE 8 MG: 10 INJECTION INTRAMUSCULAR; INTRAVENOUS at 16:22

## 2019-03-04 RX ADMIN — SODIUM CHLORIDE, POTASSIUM CHLORIDE, SODIUM LACTATE AND CALCIUM CHLORIDE: 600; 310; 30; 20 INJECTION, SOLUTION INTRAVENOUS at 16:09

## 2019-03-04 RX ADMIN — Medication 1 CAPSULE: at 08:45

## 2019-03-04 RX ADMIN — FENTANYL CITRATE 50 MCG: 50 INJECTION INTRAMUSCULAR; INTRAVENOUS at 16:10

## 2019-03-04 RX ADMIN — FENTANYL CITRATE 25 MCG: 50 INJECTION, SOLUTION INTRAMUSCULAR; INTRAVENOUS at 15:47

## 2019-03-04 RX ADMIN — CEFAZOLIN SODIUM 2 G: 2 INJECTION, SOLUTION INTRAVENOUS at 16:10

## 2019-03-04 RX ADMIN — HYDROCODONE BITARTRATE AND ACETAMINOPHEN 1 TABLET: 5; 325 TABLET ORAL at 13:06

## 2019-03-04 RX ADMIN — TRANEXAMIC ACID 1000 MG: 100 INJECTION, SOLUTION INTRAVENOUS at 16:21

## 2019-03-04 RX ADMIN — NEOSTIGMINE METHYLSULFATE 3 MG: 1 INJECTION INTRAMUSCULAR; INTRAVENOUS; SUBCUTANEOUS at 17:14

## 2019-03-04 RX ADMIN — SODIUM CHLORIDE 75 ML/HR: 9 INJECTION, SOLUTION INTRAVENOUS at 06:31

## 2019-03-04 RX ADMIN — HYDROCODONE BITARTRATE AND ACETAMINOPHEN 1 TABLET: 5; 325 TABLET ORAL at 08:45

## 2019-03-04 RX ADMIN — BUDESONIDE 6 MG: 3 CAPSULE, GELATIN COATED ORAL at 08:49

## 2019-03-04 RX ADMIN — ROCURONIUM BROMIDE 30 MG: 10 INJECTION INTRAVENOUS at 16:13

## 2019-03-04 RX ADMIN — SODIUM CHLORIDE, POTASSIUM CHLORIDE, SODIUM LACTATE AND CALCIUM CHLORIDE 9 ML/HR: 600; 310; 30; 20 INJECTION, SOLUTION INTRAVENOUS at 15:46

## 2019-03-04 RX ADMIN — HYDROMORPHONE HYDROCHLORIDE 0.5 MG: 1 INJECTION, SOLUTION INTRAMUSCULAR; INTRAVENOUS; SUBCUTANEOUS at 11:22

## 2019-03-04 RX ADMIN — ONDANSETRON 4 MG: 2 INJECTION INTRAMUSCULAR; INTRAVENOUS at 17:14

## 2019-03-04 RX ADMIN — HYDROCODONE BITARTRATE AND ACETAMINOPHEN 1 TABLET: 5; 325 TABLET ORAL at 04:06

## 2019-03-04 RX ADMIN — GLYCOPYRROLATE 0.3 MG: 0.2 INJECTION INTRAMUSCULAR; INTRAVENOUS at 17:14

## 2019-03-04 RX ADMIN — FENTANYL CITRATE 25 MCG: 50 INJECTION, SOLUTION INTRAMUSCULAR; INTRAVENOUS at 17:55

## 2019-03-04 RX ADMIN — PROPOFOL 150 MG: 10 INJECTION, EMULSION INTRAVENOUS at 16:13

## 2019-03-04 RX ADMIN — LIDOCAINE HYDROCHLORIDE 100 MG: 20 INJECTION, SOLUTION INFILTRATION; PERINEURAL at 16:13

## 2019-03-04 NOTE — PLAN OF CARE
Problem: Fall Risk (Adult)  Goal: Absence of Fall  Outcome: Ongoing (interventions implemented as appropriate)      Problem: Patient Care Overview  Goal: Plan of Care Review  Outcome: Ongoing (interventions implemented as appropriate)   03/04/19 0551   Plan of Care Review   Progress no change   OTHER   Outcome Summary Pt appeared to sleep fair, pain managed with oral meds, unable to void per purewick, had to be i/o cathed x1, temp max 99.1, surgery to be later this afternoon.        Problem: Skin Injury Risk (Adult)  Goal: Skin Health and Integrity  Outcome: Ongoing (interventions implemented as appropriate)

## 2019-03-04 NOTE — CONSULTS
ORTHOPAEDIC SURGERY CONSULT NOTE  HPI:  Patient is a 79 y.o. Not  or  female who presents with hip pain after a fall from standing.  They presented to the ER for further workup where a right Femoral Neck Fracture was found. I was consulted for further management.      Past Medical History:   Diagnosis Date   • Insomnia      History reviewed. No pertinent surgical history.  Prior to Admission medications    Medication Sig Start Date End Date Taking? Authorizing Provider   Budesonide (ENTOCORT EC) 3 MG 24 hr capsule TAKE 3 CS PO QD 5/27/16   Hallie Shipley MD   cetirizine-pseudoephedrine (ALL DAY ALLERGY-D) 5-120 MG per 12 hr tablet Take 1 tablet by mouth 2 (Two) Times a Day. 2/25/19   Bobby Jimenez MD   Cholecalciferol (VITAMIN D3) 2000 units tablet Take  by mouth.    Hallie Shipley MD   Multiple Vitamins-Minerals (WOMENS 50+ MULTI VITAMIN/MIN PO) Take  by mouth.    Hallie Shipley MD   Probiotic Product (PROBIOTIC DAILY PO) Take  by mouth.    Hallie Shipley MD   zolpidem (AMBIEN) 10 MG tablet TAKE 1 TABLET BY MOUTH EVERY NIGHT AT BEDTIME AS NEEDED FOR SLEEP 1/28/19   Bobby Jimenez MD     Allergies   Allergen Reactions   • Quinine Derivatives    • Sulfa Antibiotics      Most Recent Immunizations   Administered Date(s) Administered   • Flu Vaccine High Dose PF 65YR+ 11/20/2018   • Tdap 03/02/2019     Social History     Tobacco Use   • Smoking status: Never Smoker   Substance Use Topics   • Alcohol use: Yes      Social History     Substance and Sexual Activity   Drug Use Defer     REVIEW OF SYSTEMS:  Head: negative for headache  Respiratory: negative for shortness of breath.   Cardiovascular: negative for chest pain.   Gastrointestinal: negative abdominal pain.   Neurological: negative for LOC  Psychiatric/Behavioral: negative for memory loss.   All other systems reviewed and are negative  VITALS: /76 (BP Location: Right arm, Patient Position: Lying)   Pulse 80   " Temp 98.3 °F (36.8 °C) (Oral)   Resp 16   Ht 157.5 cm (62\")   Wt 51.7 kg (114 lb)   SpO2 97%   BMI 20.85 kg/m²  Body mass index is 20.85 kg/m².  EXAM:   CONSTITUTIONAL: A&Ox3, No acute distress  LUNGS: Equal chest rise, no shortness of air  CARDIOVASCULAR: palpable peripheral pulses  SKIN: no skin lesions in the area examined  LYMPH: no lymphadenopathy in the area examined  EXTREMITY: Right Lower Extremity   Tenderness to Palpation: Tenderness to palpation at the hip   Pulses:  Palpable DP/PT pulses   Sensation: Sensation intact to light touch to saphenous/sural/deep peroneal/superficial peroneal/tibial nerves   Motor: 5 out of 5 EHL/FHL/TA/GS motor complexes   Range of Motion: Range of motion of hip deferred secondary to pain.  Positive pain with passive leg roll    DATA REVIEW:   Ct Head Without Contrast    Result Date: 3/2/2019  No acute intracranial hemorrhage, although the patient does have a right parieto-occipital soft tissue hematoma/laceration  Radiation dose reduction techniques were utilized, including automated exposure control and exposure modulation based on body size.  This report was finalized on 3/2/2019 10:31 PM by Dr. Padmini Rosenbaum M.D.      Xr Chest 1 View    Result Date: 3/2/2019  Cardiomegaly. No acute infiltrates.  This report was finalized on 3/2/2019 11:00 PM by Dr. Padmini Rosenbaum M.D.      Xr Hip With Or Without Pelvis 2 - 3 View Right    Result Date: 3/2/2019  Right femoral neck fracture.  This report was finalized on 3/2/2019 10:59 PM by Dr. Padmini Rosenbaum M.D.      Labs:   Results for the past 24 hours: No results found for this or any previous visit (from the past 24 hour(s)).          IMPRESSION:  Patient is a 79 y.o. Not  or  female with right Femoral Neck Fracture  PLAN:   - Admited to: Trever Syed MD  - Diet: NPO Now  - Weight Bearing:Right Lower Extremity Non Weight Bearing  - Labs: None additional needed  - Imaging: X-Ray of L knee  - Surgery: " Total hip arthroplasty for femoral neck fracture  - Consent: The risks and benefits of operative versus nonoperative treatment were discussed.  The patient elected to undergo operative treatment of their injury.  The risks discussed included but were not limited to blood clots, MI, stroke, other medical complications, infection, damage to neurovascular structures,, fracture, , dislocation, , loss of range of motion, stiffness,, infection, need for further procedures, and and risk of anesthesia..  No guarantees were made   - Disposition: Surgery later today for right anterior total hip.  A thorough discussion was had with the patient about total hip versus hemiarthroplasty.  The patient wishes to proceed with total hip.    Michael Gillespie II, MD  Orthopaedic Surgery  [facility]

## 2019-03-04 NOTE — ANESTHESIA PREPROCEDURE EVALUATION
Anesthesia Evaluation     Patient summary reviewed and Nursing notes reviewed   NPO Solid Status: > 8 hours  NPO Liquid Status: > 2 hours           Airway   Mallampati: II  TM distance: >3 FB  Neck ROM: full  Dental - normal exam     Pulmonary - normal exam   (+) asthma, recent URI,   Cardiovascular - negative cardio ROS and normal exam    ECG reviewed        Neuro/Psych- negative ROS  GI/Hepatic/Renal/Endo - negative ROS     Musculoskeletal (-) negative ROS    Abdominal    Substance History - negative use     OB/GYN negative ob/gyn ROS         Other                        Anesthesia Plan    ASA 3     general     Anesthetic plan, all risks, benefits, and alternatives have been provided, discussed and informed consent has been obtained with: patient.

## 2019-03-04 NOTE — PROGRESS NOTES
[facility]     LOS: 2 days     Subjective:    CC:Hip Injury (Fall from 2nd step from bottom stair; Right sided hip pain; Shortened and rotated.  Pt denies previous trauma.  No LOC; No blood thinners; Did strike her face; Laceration to lip. ) and Fall    DIET:  Diet Order   Procedures   • NPO Diet NPO Except: Ice Chips, Sips With Meds     No cp  No soa  Hip pain is controlled    Objective:    Vital Signs:  Temp:  [97 °F (36.1 °C)-100.2 °F (37.9 °C)] 97 °F (36.1 °C)  Heart Rate:  [80-90] 82  Resp:  [16] 16  BP: (125-142)/(64-77) 131/71    SpO2:  [90 %-98 %] 97 %  on  Flow (L/min):  [2] 2;   Device (Oxygen Therapy): room air  Body mass index is 20.85 kg/m².    Physical Exam   Constitutional: She appears well-developed and well-nourished.   HENT:   Head: Normocephalic and atraumatic.   Cardiovascular: Exam reveals no friction rub.   No murmur heard.  Pulmonary/Chest: Effort normal and breath sounds normal.   Abdominal: Soft. Bowel sounds are normal. She exhibits no distension. There is no tenderness.   Neurological: She is alert.   Skin: Skin is warm and dry.   feet neurovascularly intact    Results Review:    Glucose   Date Value Ref Range Status   03/03/2019 119 (H) 65 - 99 mg/dL Final   03/02/2019 108 (H) 65 - 99 mg/dL Final     Results from last 7 days   Lab Units 03/03/19  0516   WBC 10*3/mm3 10.98*   HEMOGLOBIN g/dL 12.2   HEMATOCRIT % 37.8   PLATELETS 10*3/mm3 281     Results from last 7 days   Lab Units 03/03/19  0516   SODIUM mmol/L 142   POTASSIUM mmol/L 3.6   CHLORIDE mmol/L 102   CO2 mmol/L 24.3   BUN mg/dL 12   CREATININE mg/dL 0.45*   CALCIUM mg/dL 8.3*   BILIRUBIN mg/dL 0.4   ALK PHOS U/L 63   ALT (SGPT) U/L 32   AST (SGOT) U/L 53*   GLUCOSE mg/dL 119*     Results from last 7 days   Lab Units 03/02/19  2213   INR  1.04   APTT seconds 23.9             Cultures:       I have reviewed daily medications and changes in CPOE    Scheduled meds    Budesonide 6 mg Oral Daily    lactobacillus acidophilus 1 capsule Oral Daily   sodium chloride 3 mL Intravenous Q12H         sodium chloride 75 mL/hr Last Rate: 75 mL/hr (03/04/19 0631)     PRN meds  •  acetaminophen  •  HYDROcodone-acetaminophen  •  HYDROmorphone **AND** naloxone  •  ondansetron **OR** ondansetron ODT **OR** ondansetron  •  sennosides-docusate sodium  •  [COMPLETED] Insert peripheral IV **AND** sodium chloride  •  sodium chloride  •  zolpidem        Closed fracture of neck of right femur (CMS/HCC)    Insomnia    Fall        Assessment/Plan:    · Right femur fracture: We will give pain control and start bowel regimen.  IV fluids.  EKG unremarkable.  Consult orthopedic surgery. Fairly good exercise, no chest pain, no LOC  · Fall  · Insomnia: We will continue her home Ambien.  · Reflexes: SCD  · Full code    DVT PPX: scd      Wiliam Pereyra MD  03/04/19  1:46 PM

## 2019-03-04 NOTE — ANESTHESIA PROCEDURE NOTES
Airway  Urgency: elective    Date/Time: 3/4/2019 4:17 PM  Airway not difficult    General Information and Staff    Patient location during procedure: OR  Anesthesiologist: Drew Biswas MD  CRNA: Rosemarie Dan CRNA    Indications and Patient Condition  Indications for airway management: airway protection    Preoxygenated: yes  MILS not maintained throughout  Mask difficulty assessment: 1 - vent by mask    Final Airway Details  Final airway type: endotracheal airway      Successful airway: ETT  Cuffed: yes   Successful intubation technique: direct laryngoscopy  Endotracheal tube insertion site: oral  Blade: Evie  Blade size: 3  ETT size (mm): 7.0  Cormack-Lehane Classification: grade IIa - partial view of glottis  Placement verified by: chest auscultation and capnometry   Cuff volume (mL): 7  Measured from: lips  ETT to lips (cm): 21  Number of attempts at approach: 1    Additional Comments  Pt preoxygenated prior to induction, easy mask airway, atraumatic intubation,+ ETCO2, + bs bilat,  ETT secured and connected to ventilator.

## 2019-03-04 NOTE — ANESTHESIA POSTPROCEDURE EVALUATION
Patient: Samantha Davila    Procedure Summary     Date:  03/04/19 Room / Location:  [facility] OR [address] [place] [address] MAIN OR    Anesthesia Start:  1609 Anesthesia Stop:  1740    Procedure:  TOTAL HIP ARTHROPLASTY ANTERIOR WITH HANA TABLE (Right Hip) Diagnosis:      Surgeon:  Michael Gillespie II, MD Provider:  Drew Biswas MD    Anesthesia Type:  general ASA Status:  3          Anesthesia Type: general  Last vitals  BP   142/71 (03/04/19 1805)   Temp   37.2 °C (98.9 °F) (03/04/19 1740)   Pulse   70 (03/04/19 1805)   Resp   16 (03/04/19 1805)     SpO2   100 % (03/04/19 1805)     Post Anesthesia Care and Evaluation    Patient location during evaluation: bedside  Patient participation: complete - patient participated  Level of consciousness: awake  Pain management: adequate  Airway patency: patent  Anesthetic complications: No anesthetic complications    Cardiovascular status: acceptable  Respiratory status: acceptable  Hydration status: acceptable

## 2019-03-04 NOTE — PLAN OF CARE
Problem: Patient Care Overview  Goal: Plan of Care Review  Outcome: Ongoing (interventions implemented as appropriate)   03/04/19 0551 03/04/19 1638 03/04/19 1849   Coping/Psychosocial   Plan of Care Reviewed With --  patient --    Plan of Care Review   Progress no change --  --    OTHER   Outcome Summary --  --  Pt same day post op. VSS. Pt alert and easily aroused. Sleeping between care d/t anesthesia. Will cont to monitor.

## 2019-03-05 LAB
ANION GAP SERPL CALCULATED.3IONS-SCNC: 8.7 MMOL/L
BUN BLD-MCNC: 13 MG/DL (ref 8–23)
BUN/CREAT SERPL: 24.5 (ref 7–25)
CALCIUM SPEC-SCNC: 8.1 MG/DL (ref 8.6–10.5)
CHLORIDE SERPL-SCNC: 99 MMOL/L (ref 98–107)
CO2 SERPL-SCNC: 28.3 MMOL/L (ref 22–29)
CREAT BLD-MCNC: 0.53 MG/DL (ref 0.57–1)
DEPRECATED RDW RBC AUTO: 45.8 FL (ref 37–54)
ERYTHROCYTE [DISTWIDTH] IN BLOOD BY AUTOMATED COUNT: 13.1 % (ref 12.3–15.4)
GFR SERPL CREATININE-BSD FRML MDRD: 111 ML/MIN/1.73
GLUCOSE BLD-MCNC: 122 MG/DL (ref 65–99)
HCT VFR BLD AUTO: 34.9 % (ref 34–46.6)
HGB BLD-MCNC: 11.3 G/DL (ref 12–15.9)
MCH RBC QN AUTO: 30.9 PG (ref 26.6–33)
MCHC RBC AUTO-ENTMCNC: 32.4 G/DL (ref 31.5–35.7)
MCV RBC AUTO: 95.4 FL (ref 79–97)
PLATELET # BLD AUTO: 251 10*3/MM3 (ref 140–450)
PMV BLD AUTO: 9.9 FL (ref 6–12)
POTASSIUM BLD-SCNC: 3.9 MMOL/L (ref 3.5–5.2)
RBC # BLD AUTO: 3.66 10*6/MM3 (ref 3.77–5.28)
SODIUM BLD-SCNC: 136 MMOL/L (ref 136–145)
WBC NRBC COR # BLD: 12.44 10*3/MM3 (ref 3.4–10.8)

## 2019-03-05 PROCEDURE — 25010000002 HYDROMORPHONE PER 4 MG: Performed by: INTERNAL MEDICINE

## 2019-03-05 PROCEDURE — 97162 PT EVAL MOD COMPLEX 30 MIN: CPT | Performed by: PHYSICAL THERAPIST

## 2019-03-05 PROCEDURE — 85027 COMPLETE CBC AUTOMATED: CPT | Performed by: ORTHOPAEDIC SURGERY

## 2019-03-05 PROCEDURE — 25010000003 CEFAZOLIN IN DEXTROSE 2-4 GM/100ML-% SOLUTION: Performed by: ORTHOPAEDIC SURGERY

## 2019-03-05 PROCEDURE — 97110 THERAPEUTIC EXERCISES: CPT | Performed by: PHYSICAL THERAPIST

## 2019-03-05 PROCEDURE — 80048 BASIC METABOLIC PNL TOTAL CA: CPT | Performed by: ORTHOPAEDIC SURGERY

## 2019-03-05 RX ADMIN — HYDROCODONE BITARTRATE AND ACETAMINOPHEN 1 TABLET: 5; 325 TABLET ORAL at 08:00

## 2019-03-05 RX ADMIN — BUDESONIDE 6 MG: 3 CAPSULE, GELATIN COATED ORAL at 08:01

## 2019-03-05 RX ADMIN — CEFAZOLIN SODIUM 2 G: 2 INJECTION, SOLUTION INTRAVENOUS at 01:36

## 2019-03-05 RX ADMIN — APIXABAN 2.5 MG: 2.5 TABLET, FILM COATED ORAL at 21:18

## 2019-03-05 RX ADMIN — HYDROCODONE BITARTRATE AND ACETAMINOPHEN 1 TABLET: 5; 325 TABLET ORAL at 02:08

## 2019-03-05 RX ADMIN — Medication 1 CAPSULE: at 08:01

## 2019-03-05 RX ADMIN — SODIUM CHLORIDE, PRESERVATIVE FREE 3 ML: 5 INJECTION INTRAVENOUS at 21:18

## 2019-03-05 RX ADMIN — CEFAZOLIN SODIUM 2 G: 2 INJECTION, SOLUTION INTRAVENOUS at 08:00

## 2019-03-05 RX ADMIN — HYDROCODONE BITARTRATE AND ACETAMINOPHEN 1 TABLET: 5; 325 TABLET ORAL at 13:32

## 2019-03-05 RX ADMIN — CEFAZOLIN SODIUM 2 G: 2 INJECTION, SOLUTION INTRAVENOUS at 16:38

## 2019-03-05 RX ADMIN — HYDROMORPHONE HYDROCHLORIDE 0.5 MG: 1 INJECTION, SOLUTION INTRAMUSCULAR; INTRAVENOUS; SUBCUTANEOUS at 11:19

## 2019-03-05 RX ADMIN — APIXABAN 2.5 MG: 2.5 TABLET, FILM COATED ORAL at 08:01

## 2019-03-05 NOTE — DISCHARGE INSTRUCTIONS
This patient currently has a CRUZ Wound Vac dressing on. It was placed on the incision under sterile conditions at the end of surgery. The dressing is a suction dressing that promotes wound healing and lowers infection rates. It has a battery pack attached to the dressing by a thin tube. The CRUZ Wound Vac battery only lasts 7 days. On day 7 after surgery, the entire dressing must be taken down so that the incision can be seen. If the incision is completely dry and no new drainage can be seen, there is no need place a new dressing. If there is drainage, clean dry gauze can be used to cover the incision with either tape or an ace wrap to hold it in place. The dry dressing can be changed daily until the patient is seen in clinic. Please call my office, the [facility], at [phone] with any questions.     Patient may follow-up with Dr. Michael Gillespie II at the [facility].  The phone number is [phone].  Please call to schedule an appointment at your convenience within 3 weeks.

## 2019-03-05 NOTE — PLAN OF CARE
Problem: Patient Care Overview  Goal: Plan of Care Review   03/05/19 3620   OTHER   Outcome Summary Pt is s/p R BABAR anterior approach secondary to femoral neck fx. She was walking with a walker prior to admission. She presents with severe pain, limited mobility and difficulty walking. Pt would benefit from PT to address these impairments.

## 2019-03-05 NOTE — DISCHARGE PLACEMENT REQUEST
"Sheng Rosa (79 y.o. Female)     Date of Birth Social Security Number Address Home Phone MRN    1940  [id number] [address] [phone] [id number]    [place] Marital Status          None        Admission Date Admission Type Admitting Provider Attending Provider Department, Room/Bed    3/2/19 Emergency Trever Syed MD Edling, Stephen A, MD [address], P789/1    Discharge Date Discharge Disposition Discharge Destination                       Attending Provider:  Wiliam Pereyra MD    Allergies:  Quinine Derivatives, Sulfa Antibiotics    Isolation:  None   Infection:  None   Code Status:  CPR    Ht:  157.5 cm (62\")   Wt:  51.7 kg (114 lb)    Admission Cmt:  None   Principal Problem:  Closed fracture of neck of right femur (CMS/[facility]) [S72.001A]                 Active Insurance as of 3/2/2019     Primary Coverage     Payor Plan Insurance Group Employer/Plan Group    MEDICARE MEDICARE A & B      Payor Plan Address Payor Plan Phone Number Payor Plan Fax Number Effective Dates    [address] [phone]  1/1/2005 - None Entered    [facility] [id number]       Subscriber Name Subscriber Birth Date Member ID       SHENG ROSA 1940 [id number]           Secondary Coverage     Payor Plan Insurance Group Employer/Plan Group    AARP MED SUPP AARP HEALTH CARE OPTIONS      Payor Plan Address Payor Plan Phone Number Payor Plan Fax Number Effective Dates    [facility] [phone]  1/1/2016 - None Entered    [address]       [facility] [address]       Subscriber Name Subscriber Birth Date Member ID       SHENG ROSA 1940 [id number]                 Emergency Contacts      (Rel.) Home Phone Work Phone Mobile Phone    John Rosa (Spouse) [phone] -- --    Ulisses Rosa (Son) [phone] -- [phone]              "

## 2019-03-05 NOTE — THERAPY TREATMENT NOTE
Acute Care - Physical Therapy Treatment Note  [facility]     Patient Name: Samantha Davila  : 1940  MRN: [id number]  Today's Date: 3/5/2019             Admit Date: 3/2/2019    Visit Dx:    ICD-10-CM ICD-9-CM   1. Closed fracture of neck of right femur, initial encounter (CMS/[facility]) S72.001A 820.8     Patient Active Problem List   Diagnosis   • Neurodermatitis   • Insomnia   • Viral upper respiratory tract infection   • Closed fracture of neck of right femur (CMS/[facility])   • Fall       Therapy Treatment    Rehabilitation Treatment Summary     Row Name 19 1430             Treatment Time/Intention    Discipline  physical therapist  -      Document Type  therapy note (daily note)  -TYLER      Subjective Information  complains of;pain  -TYLER      Mode of Treatment  physical therapy  -      Therapy Frequency (PT Clinical Impression)  2 times/day  -TYLER      Recorded by [] Nupur Mora, PT 19 1608      Row Name 19 1430             Bed Mobility Assessment/Treatment    Supine-Sit [place] (Bed Mobility)  moderate assist (50% patient effort);2 person assist  -      Recorded by [TYLER] Nupur Mora, PT 19 1608      Row Name 19 1430             Transfer Assessment/Treatment    Transfer Assessment/Treatment  sit-stand transfer;stand-sit transfer;stand pivot/stand step transfer  -      Recorded by [TYLER] Nupur Mora, PT 19 1608      Row Name 19 1430             Sit-Stand Transfer    Sit-Stand [place] (Transfers)  moderate assist (50% patient effort);2 person assist  -      Assistive Device (Sit-Stand Transfers)  walker, front-wheeled  -TYLER      Recorded by [] Nupur Mora, PT 19 1608      Row Name 19 1430             Stand-Sit Transfer    Stand-Sit [place] (Transfers)  moderate assist (50% patient effort);2 person assist  -TYLER      Assistive Device (Stand-Sit Transfers)  walker, front-wheeled  -TYLER      Recorded by  [TYLER] Nupur Mora, PT 03/05/19 1608      Row Name 03/05/19 1430             Stand Pivot/Stand Step Transfer    Stand Pivot/Stand Step [place]  moderate assist (50% patient effort);2 person assist  -      Assistive Device (Stand Pivot Stand Step Transfer)  walker, front-wheeled  -KH      Recorded by [TYLER] Nupur Mora, PT 03/05/19 1608      Row Name 03/05/19 1430             Gait/Stairs Assessment/Training    Comment (Gait/Stairs)  not tolerated  -KH      Recorded by [TYLER] Nupur Mora, PT 03/05/19 1608      Row Name 03/05/19 1430             Positioning and Restraints    Pre-Treatment Position  in bed  -KH      Post Treatment Position  chair  -KH      In Chair  reclined;call light within reach;encouraged to call for assist;exit alarm on;with family/caregiver;notified nsg  -KH      Recorded by [TYLER] Nupur Mora, PT 03/05/19 1608      Row Name 03/05/19 1430             Pain Scale: Numbers Pre/Post-Treatment    Pain Scale: Numbers, Pretreatment  7/10  -KH      Pain Scale: Numbers, Post-Treatment  10/10  -KH      Pain Location - Side  Right  -KH      Pain Location  hip  -KH      Pain Intervention(s)  Repositioned;Cold applied  -KH      Recorded by [TYLER] Nupur Mora, PT 03/05/19 1608      Row Name                Wound 03/04/19 1708 Right hip incision    Wound - Properties Group Date first assessed: 03/04/19 [VB] Time first assessed: 1708 [VB] Side: Right [VB] Location: hip [VB] Type: incision [VB] Recorded by:  [VB] Sherrie Brock RN 03/04/19 1708    Row Name 03/05/19 1430             Plan of Care Review    Plan of Care Reviewed With  patient;spouse  -KH      Recorded by [Nupur Johns, PT 03/05/19 1608        User Key  (r) = Recorded By, (t) = Taken By, (c) = Cosigned By    Initials Name Effective Dates Discipline    Nupur Delvalle, PT 02/05/19 -  PT    Sherrie Prasad RN 06/16/16 -  Nurse          Wound 03/04/19 1707 Right hip  incision (Active)   Dressing Appearance dry;intact;no drainage;other (see comments) 3/5/2019 12:02 PM   Closure MARY 3/5/2019 12:02 PM   Base dressing in place, unable to visualize 3/5/2019 12:02 PM   Drainage Amount none 3/5/2019 12:02 PM       Rehab Goal Summary     Row Name 03/05/19 1337             Physical Therapy Goals    Bed Mobility Goal Selection (PT)  bed mobility, PT goal 1  -KH      Transfer Goal Selection (PT)  transfer, PT goal 1  -KH      Gait Training Goal Selection (PT)  gait training, PT goal 1  -KH         Bed Mobility Goal 1 (PT)    Activity/Assistive Device (Bed Mobility Goal 1, PT)  bed mobility activities, all  -KH      [place] Level/Cues Needed (Bed Mobility Goal 1, PT)  moderate assist (50-74% patient effort)  -KH      Time Frame (Bed Mobility Goal 1, PT)  4 days  -KH         Transfer Goal 1 (PT)    Activity/Assistive Device (Transfer Goal 1, PT)  transfers, all  -KH      [place] Level/Cues Needed (Transfer Goal 1, PT)  moderate assist (50-74% patient effort)  -KH      Time Frame (Transfer Goal 1, PT)  4 days  -KH         Gait Training Goal 1 (PT)    Activity/Assistive Device (Gait Training Goal 1, PT)  gait (walking locomotion)  -KH      [place] Level (Gait Training Goal 1, PT)  moderate assist (50-74% patient effort)  -KH      Distance (Gait Goal 1, PT)  50 ft  -KH      Time Frame (Gait Training Goal 1, PT)  5 days  -KH         Patient Education Goal (PT)    Activity (Patient Education Goal, PT)  HEP  -KH      [place]/Cues/Accuracy (Memory Goal 2, PT)  demonstrates adequately  -KH      Time Frame (Patient Education Goal, PT)  4 days  -KH        User Key  (r) = Recorded By, (t) = Taken By, (c) = Cosigned By    Initials Name Provider Type Discipline    Nupur Delvalle, PT Physical Therapist PT          Physical Therapy Education     Title: PT OT SLP Therapies (Done)     Topic: Physical Therapy (Done)     Point: Mobility training (Done)     Learning Progress  Summary           Patient Acceptance, E,D, VU,NR by  at 3/5/2019  1:44 PM                   Point: Home exercise program (Done)     Learning Progress Summary           Patient Acceptance, E,D, VU,NR by  at 3/5/2019  1:44 PM                   Point: Body mechanics (Done)     Learning Progress Summary           Patient Acceptance, E,D, VU,NR by  at 3/5/2019  1:44 PM                   Point: Precautions (Done)     Learning Progress Summary           Patient Acceptance, E,D, VU,NR by  at 3/5/2019  1:44 PM                               User Key     Initials Effective Dates Name Provider Type Discipline     02/05/19 -  Nupur Mora, PT Physical Therapist PT                PT Recommendation and Plan  Anticipated Discharge Disposition (PT): skilled nursing facility  Planned Therapy Interventions (PT Eval): balance training, bed mobility training, gait training, home exercise program, patient/family education, strengthening, transfer training  Therapy Frequency (PT Clinical Impression): 2 times/day  Outcome Summary/Treatment Plan (PT)  Anticipated Discharge Disposition (PT): skilled nursing facility  Plan of Care Reviewed With: patient, spouse  Outcome Summary: Pt is s/p R BABAR anterior approach secondary to femoral neck fx. She was walking with a walker prior to admission. She presents with severe pain, limited mobility and difficulty walking. Pt would benefit from PT to address these impairments.   Outcome Measures     Row Name 03/05/19 1346             How much help from another person do you currently need...    Turning from your back to your side while in flat bed without using bedrails?  2  -KH      Moving from lying on back to sitting on the side of a flat bed without bedrails?  2  -KH      Moving to and from a bed to a chair (including a wheelchair)?  1  -KH      Standing up from a chair using your arms (e.g., wheelchair, bedside chair)?  1  -KH      Climbing 3-5 steps with a railing?  1  -KH       To walk in hospital room?  1  -KH      AM-PAC 6 Clicks Score  8  -KH         Functional Assessment    Outcome Measure Options  AM-PAC 6 Clicks Basic Mobility (PT)  -KH        User Key  (r) = Recorded By, (t) = Taken By, (c) = Cosigned By    Initials Name Provider Type    Nupur Delvalle PT Physical Therapist         Time Calculation:   PT Charges     Row Name 03/05/19 1445 03/05/19 1336          Time Calculation    Start Time  1428  -KH  1315  -KH     Stop Time  1445  -KH  1336  -KH     Time Calculation (min)  17 min  -KH  21 min  -KH     PT Received On  03/05/19  -KH  03/05/19  -KH     PT - Next Appointment  03/06/19  -KH  03/05/19  -KH     PT Goal Re-Cert Due Date  --  03/12/19  -KH        Time Calculation- PT    Total Timed Code Minutes- PT  17 minute(s)  -KH  15 minute(s)  -KH       User Key  (r) = Recorded By, (t) = Taken By, (c) = Cosigned By    Initials Name Provider Type    Nupur Delvalle, ISIS Physical Therapist        Therapy Suggested Charges     Code   Minutes Charges    None           Therapy Charges for Today     Code Description Service Date Service Provider Modifiers Qty    39710450989 HC PT EVAL MOD COMPLEXITY 2 3/5/2019 Nupur Mora, PT GP 1    46338129364 HC PT THER PROC EA 15 MIN 3/5/2019 Nupur Mora, PT GP 1    07940982043 HC PT THER PROC EA 15 MIN 3/5/2019 Nupur Mora, PT GP 1    98435764737 HC PT THER SUPP EA 15 MIN 3/5/2019 Nupur Mora, PT GP 1          PT G-Codes  Outcome Measure Options: AM-PAC 6 Clicks Basic Mobility (PT)  AM-PAC 6 Clicks Score: 8    Nupur Mora, ISIS  3/5/2019

## 2019-03-05 NOTE — PLAN OF CARE
Problem: Fall Risk (Adult)  Goal: Identify Related Risk Factors and Signs and Symptoms  Outcome: Outcome(s) achieved Date Met: 03/05/19    Goal: Absence of Fall  Outcome: Ongoing (interventions implemented as appropriate)      Problem: Patient Care Overview  Goal: Plan of Care Review  Outcome: Ongoing (interventions implemented as appropriate)   03/05/19 0409   Plan of Care Review   Progress improving   OTHER   Outcome Summary VSS. Peripheral/neurovascular assessment WNL. CRUZ dressing CDI with flashing green. Transferred to [facility] with walker and assist X2. PT very fearful of falling. Did have increased pain with activity that was effectively managed with PO pain medication. Voiding without problem. VErbalized understanding of all ediucation.        Problem: Skin Injury Risk (Adult)  Goal: Identify Related Risk Factors and Signs and Symptoms  Outcome: Outcome(s) achieved Date Met: 03/05/19 03/05/19 0409   Skin Injury Risk (Adult)   Related Risk Factors (Skin Injury Risk) mobility impaired     Goal: Skin Health and Integrity  Outcome: Ongoing (interventions implemented as appropriate)      Problem: Fracture Orthopaedic (Adult)  Goal: Signs and Symptoms of Listed Potential Problems Will be Absent, Minimized or Managed (Fracture Orthopaedic)  Outcome: Ongoing (interventions implemented as appropriate)

## 2019-03-05 NOTE — THERAPY EVALUATION
Acute Care - Physical Therapy Initial Evaluation  [facility]     Patient Name: Samantha Davila  : 1940  MRN: [id number]  Today's Date: 3/5/2019                Admit Date: 3/2/2019    Visit Dx:     ICD-10-CM ICD-9-CM   1. Closed fracture of neck of right femur, initial encounter (CMS/[facility]) S72.001A 820.8     Patient Active Problem List   Diagnosis   • Neurodermatitis   • Insomnia   • Viral upper respiratory tract infection   • Closed fracture of neck of right femur (CMS/[facility])   • Fall     Past Medical History:   Diagnosis Date   • Asthma    • Insomnia      Past Surgical History:   Procedure Laterality Date   • BACK SURGERY     • ROTATOR CUFF REPAIR Left    • TONSILLECTOMY     • TOTAL HIP ARTHROPLASTY Right 3/4/2019    Procedure: TOTAL HIP ARTHROPLASTY ANTERIOR WITH HANA TABLE;  Surgeon: Michael Gillespie II, MD;  Location: [facility] OR;  Service: Orthopedics        PT ASSESSMENT (last 12 hours)      Physical Therapy Evaluation     Row Name 19 1337          PT Evaluation Time/Intention    Subjective Information  complains of;pain  -KH     Document Type  evaluation  -KH     Mode of Treatment  physical therapy  -KH     Patient Effort  fair  -KH     Symptoms Noted During/After Treatment  increased pain  -KH     Row Name 19 1337          General Information    Patient Observations  alert;cooperative;agree to therapy  -KH     General Observations of Patient  in bed, leaning left,  present  -KH     Prior Level of Function  independent:  -KH     Pertinent History of Current Functional Problem  s/p BABAR anterior approach secondary to fx  -KH     Existing Precautions/Restrictions  fall  -KH     Row Name 19 1337          Relationship/Environment    Lives With  spouse  -     Row Name 19 1337          Resource/Environmental Concerns    Current Living Arrangements  home/apartment/condo  -     Row Name 19 1337          Cognitive Assessment/Interventions    Additional  Documentation  Cognitive Assessment/Intervention (Group)  -[facility] Name 03/05/19 1337          Cognitive Assessment/Intervention- PT/OT    Orientation Status (Cognition)  oriented x 3  -     Follows Commands (Cognition)  follows one step commands;75-90% accuracy  -     Personal Safety Interventions  fall prevention program maintained;gait belt;nonskid shoes/slippers when out of bed  -[facility] Name 03/05/19 1337          Mobility Assessment/Treatment    Extremity Weight-bearing Status  right lower extremity  -     Right Lower Extremity (Weight-bearing Status)  weight-bearing as tolerated (WBAT)  -[facility] Name 03/05/19 1337          Bed Mobility Assessment/Treatment    Bed Mobility Assessment/Treatment  supine-sit;sit-supine  -     Supine-Sit [place] (Bed Mobility)  maximum assist (25% patient effort)  -     Sit-Supine [place] (Bed Mobility)  maximum assist (25% patient effort)  -     Assistive Device (Bed Mobility)  bed rails;draw sheet;head of bed elevated  -     Comment (Bed Mobility)  pt screaming out in pain and very guarded, resisting against movement. Returned to supine.   -[facility] Name 03/05/19 1337          General ROM    GENERAL ROM COMMENTS  pt very guarded against all movement in BLE  -[facility] Name 03/05/19 1337          MMT (Manual Muscle Testing)    General MMT Comments  WFL  -[facility] Name 03/05/19 1337          Motor Assessment/Intervention    Additional Documentation  Therapeutic Exercise Interventions (Group)  -[facility] Name 03/05/19 1337          Therapeutic Exercise    Comment (Therapeutic Exercise)  R BABAR protocol x 10 reps, PROM, resisting against most movement.   -[facility] Name 03/05/19 1337          Pain Assessment    Additional Documentation  Pain Scale: Numbers Pre/Post-Treatment (Group)  -[facility] Name 03/05/19 1337          Pain Scale: Numbers Pre/Post-Treatment    Pain Scale: Numbers, Pretreatment  10/10  -     Pain Scale: Numbers,  Post-Treatment  10/10  -     Pain Location - Side  Right  -KH     Pain Location  hip  -     Pain Intervention(s)  Repositioned;Cold applied notified RN who will give pain meds asap  -     Row Name             Wound 03/04/19 1708 Right hip incision    Wound - Properties Group Date first assessed: 03/04/19  -VB Time first assessed: 1708  -VB Side: Right  -VB Location: hip  -VB Type: incision  -VB    Row Name 03/05/19 1337          Plan of Care Review    Plan of Care Reviewed With  patient  -     Row Name 03/05/19 1337          Physical Therapy Clinical Impression    Patient/Family Goals Statement (PT Clinical Impression)  return to PLOF, rehab before home  -     Criteria for Skilled Interventions Met (PT Clinical Impression)  treatment indicated  -     Impairments Found (describe specific impairments)  gait, locomotion, and balance;ROM  -     Rehab Potential (PT Clinical Summary)  fair, will monitor progress closely  -     Row Name 03/05/19 6897          Physical Therapy Goals    Bed Mobility Goal Selection (PT)  bed mobility, PT goal 1  -     Transfer Goal Selection (PT)  transfer, PT goal 1  -     Gait Training Goal Selection (PT)  gait training, PT goal 1  -     Row Name 03/05/19 8594          Bed Mobility Goal 1 (PT)    Activity/Assistive Device (Bed Mobility Goal 1, PT)  bed mobility activities, all  -KH     [place] Level/Cues Needed (Bed Mobility Goal 1, PT)  moderate assist (50-74% patient effort)  -     Time Frame (Bed Mobility Goal 1, PT)  4 days  -     Row Name 03/05/19 9075          Transfer Goal 1 (PT)    Activity/Assistive Device (Transfer Goal 1, PT)  transfers, all  -KH     [place] Level/Cues Needed (Transfer Goal 1, PT)  moderate assist (50-74% patient effort)  -     Time Frame (Transfer Goal 1, PT)  4 days  -     Row Name 03/05/19 7030          Gait Training Goal 1 (PT)    Activity/Assistive Device (Gait Training Goal 1, PT)  gait (walking locomotion)  -      [place] Level (Gait Training Goal 1, PT)  moderate assist (50-74% patient effort)  -     Distance (Gait Goal 1, PT)  50 ft  -     Time Frame (Gait Training Goal 1, PT)  5 days  -     Row Name 03/05/19 1337          Patient Education Goal (PT)    Activity (Patient Education Goal, PT)  HEP  -     [place]/Cues/Accuracy (Memory Goal 2, PT)  demonstrates adequately  -     Time Frame (Patient Education Goal, PT)  4 days  -     Row Name 03/05/19 1337          Positioning and Restraints    Pre-Treatment Position  in bed  -     Post Treatment Position  bed  -KH     In Bed  fowlers;call light within reach;encouraged to call for assist;exit alarm on;notified nsg;with family/caregiver  -       User Key  (r) = Recorded By, (t) = Taken By, (c) = Cosigned By    Initials Name Provider Type    Nupur Delvalle, PT Physical Therapist    Sherrie Prasad, RN Registered Nurse        Physical Therapy Education     Title: PT OT SLP Therapies (Done)     Topic: Physical Therapy (Done)     Point: Mobility training (Done)     Learning Progress Summary           Patient Acceptance, E,D, VU,NR by  at 3/5/2019  1:44 PM                   Point: Home exercise program (Done)     Learning Progress Summary           Patient Acceptance, E,D, VU,NR by  at 3/5/2019  1:44 PM                   Point: Body mechanics (Done)     Learning Progress Summary           Patient Acceptance, E,D, VU,NR by  at 3/5/2019  1:44 PM                   Point: Precautions (Done)     Learning Progress Summary           Patient Acceptance, E,D, VU,NR by  at 3/5/2019  1:44 PM                               User Key     Initials Effective Dates Name Provider Type [facility] 02/05/19 -  Nupur Mora, PT Physical Therapist PT              PT Recommendation and Plan  Anticipated Discharge Disposition (PT): skilled nursing facility  Planned Therapy Interventions (PT Eval): balance training, bed mobility training, gait  training, home exercise program, patient/family education, strengthening, transfer training  Therapy Frequency (PT Clinical Impression): 2 times/day  Outcome Summary/Treatment Plan (PT)  Anticipated Discharge Disposition (PT): skilled nursing facility  Plan of Care Reviewed With: patient  Outcome Summary: Pt is s/p R BABAR anterior approach secondary to femoral neck fx. She was walking with a walker prior to admission. She presents with severe pain, limited mobility and difficulty walking. Pt would benefit from PT to address these impairments.   Outcome Measures     Row Name 03/05/19 1346             How much help from another person do you currently need...    Turning from your back to your side while in flat bed without using bedrails?  2  -KH      Moving from lying on back to sitting on the side of a flat bed without bedrails?  2  -KH      Moving to and from a bed to a chair (including a wheelchair)?  1  -KH      Standing up from a chair using your arms (e.g., wheelchair, bedside chair)?  1  -KH      Climbing 3-5 steps with a railing?  1  -KH      To walk in hospital room?  1  -KH      AM-PAC 6 Clicks Score  8  -KH         Functional Assessment    Outcome Measure Options  AM-PAC 6 Clicks Basic Mobility (PT)  -        User Key  (r) = Recorded By, (t) = Taken By, (c) = Cosigned By    Initials Name Provider Type    Nupur Delvalle, PT Physical Therapist         Time Calculation:   PT Charges     Row Name 03/05/19 1336             Time Calculation    Start Time  1315  -      Stop Time  1336  -      Time Calculation (min)  21 min  -      PT Received On  03/05/19  -TYLER      PT - Next Appointment  03/05/19  -TYLER      PT Goal Re-Cert Due Date  03/12/19  -TYLER         Time Calculation- PT    Total Timed Code Minutes- PT  15 minute(s)  -        User Key  (r) = Recorded By, (t) = Taken By, (c) = Cosigned By    Initials Name Provider Type    Nupur Delvalle, PT Physical Therapist        Therapy  Suggested Charges     Code   Minutes Charges    None           Therapy Charges for Today     Code Description Service Date Service Provider Modifiers Qty    17278118683 HC PT EVAL MOD COMPLEXITY 2 3/5/2019 Nupur Mora, PT GP 1    13998797360 HC PT THER PROC EA 15 MIN 3/5/2019 Nupur Mora, PT GP 1          PT G-Codes  Outcome Measure Options: AM-PAC 6 Clicks Basic Mobility (PT)  AM-PAC 6 Clicks Score: 8      Nupur Mora, PT  3/5/2019

## 2019-03-05 NOTE — PROGRESS NOTES
Continued Stay Note  [facility]     Patient Name: Samantha Davila  MRN: [id number]  Today's Date: 3/5/2019    Admit Date: 3/2/2019    Discharge Plan     Row Name 03/05/19 1711       Plan    Plan  [facility]     Patient/Family in Agreement with Plan  yes    Plan Comments  F/u w/ pt and spouse, they would like a referral to [facility] for short-term rehab. [facility] will accept. Marie to f/u w/ [facility] for bed availability either 3/6 or 3/7.         Discharge Codes    No documentation.             Michelle Quevedo RN

## 2019-03-05 NOTE — PROGRESS NOTES
[facility]     LOS: 3 days     Subjective:    CC:Hip Injury (Fall from 2nd step from bottom stair; Right sided hip pain; Shortened and rotated.  Pt denies previous trauma.  No LOC; No blood thinners; Did strike her face; Laceration to lip. ) and Fall    DIET:  Diet Order   Procedures   • Diet Regular     No cp  No soa  Hip pain is controlled  Doing well post-op  Work with pt, able to get up to bedside commode with much help and pain    Objective:    Vital Signs:  Temp:  [97.4 °F (36.3 °C)-99.2 °F (37.3 °C)] 97.4 °F (36.3 °C)  Heart Rate:  [68-93] 90  Resp:  [14-16] 16  BP: ()/(51-70) 97/54    SpO2:  [94 %-99 %] 94 %  on  Flow (L/min):  [2-4] 2;   Device (Oxygen Therapy): room air  Body mass index is 20.85 kg/m².    Physical Exam   Constitutional: She appears well-developed and well-nourished.   HENT:   Head: Normocephalic and atraumatic.   Cardiovascular: Exam reveals no friction rub.   No murmur heard.  Pulmonary/Chest: Effort normal and breath sounds normal.   Abdominal: Soft. Bowel sounds are normal. She exhibits no distension. There is no tenderness.   Musculoskeletal:   Pain with movement of the right leg   Neurological: She is alert.   Skin: Skin is warm and dry.   feet neurovascularly intact    Results Review:    Glucose   Date Value Ref Range Status   03/05/2019 122 (H) 65 - 99 mg/dL Final   03/03/2019 119 (H) 65 - 99 mg/dL Final   03/02/2019 108 (H) 65 - 99 mg/dL Final     Results from last 7 days   Lab Units 03/05/19  0727   WBC 10*3/mm3 12.44*   HEMOGLOBIN g/dL 11.3*   HEMATOCRIT % 34.9   PLATELETS 10*3/mm3 251     Results from last 7 days   Lab Units 03/05/19  0727 03/03/19  0516   SODIUM mmol/L 136 142   POTASSIUM mmol/L 3.9 3.6   CHLORIDE mmol/L 99 102   CO2 mmol/L 28.3 24.3   BUN mg/dL 13 12   CREATININE mg/dL 0.53* 0.45*   CALCIUM mg/dL 8.1* 8.3*   BILIRUBIN mg/dL  --  0.4   ALK PHOS U/L  --  63   ALT (SGPT) U/L  --  32   AST (SGOT) U/L  --  53*   GLUCOSE mg/dL  122* 119*     Results from last 7 days   Lab Units 03/02/19  2213   INR  1.04   APTT seconds 23.9             Cultures:       I have reviewed daily medications and changes in CPOE    Scheduled meds    apixaban 2.5 mg Oral Q12H   Followed by      [START ON 3/12/2019] apixaban 5 mg Oral Q12H   Budesonide 6 mg Oral Daily   lactobacillus acidophilus 1 capsule Oral Daily   sodium chloride 3 mL Intravenous Q12H         lactated ringers 9 mL/hr Last Rate: Stopped (03/04/19 1725)   sodium chloride 75 mL/hr Last Rate: 75 mL/hr (03/04/19 0631)     PRN meds  •  acetaminophen  •  HYDROcodone-acetaminophen  •  HYDROmorphone **AND** naloxone  •  ondansetron **OR** ondansetron ODT **OR** ondansetron  •  sennosides-docusate sodium  •  [COMPLETED] Insert peripheral IV **AND** sodium chloride  •  sodium chloride  •  zolpidem        Closed fracture of neck of right femur (CMS/HCC)    Insomnia    Fall        Assessment/Plan:    · Right femur fracture: We will give pain control.  IV fluids, stop if taking po.  EKG unremarkable. S/p surgery. Po and IV pain medication, prn  · Fall  · Insomnia: We will continue her home Ambien.  · collageous coitis- entocort  · Full code    DVT PPX: eliquis 2.5    Maybe d/c thursday      Wiliam Pereyra MD  03/05/19  6:25 PM

## 2019-03-05 NOTE — PROGRESS NOTES
ORTHOPAEDIC SURGERY DAILY PROGRESS NOTE  Patient is a 79 y.o. female who is 1 Day Post-Op s/p Procedure(s):  TOTAL HIP ARTHROPLASTY ANTERIOR WITH HANA TABLE     P789/1 Samantha Davila Age:79 y.o. MRN:[id number]  Admitted: 3/2/2019  Overnight events: Overnight stable since surgery.  No acute events.  Up to bedside commode with nursing.  Pain: Pain well controlled with current pain regiment.   Ambulation/Activity: Minimal activity since surgery   Vitals:  Vitals:    03/04/19 2030 03/04/19 2140 03/04/19 2250 03/05/19 0321   BP: 131/69 128/70 127/68 108/61   BP Location: Right arm Right arm Right arm Left arm   Patient Position: Lying Lying Lying Lying   Pulse: 91 90 93 85   Resp: 14 16 16 16   Temp: 99.2 °F (37.3 °C) 98.7 °F (37.1 °C) 98.9 °F (37.2 °C) 98.6 °F (37 °C)   TempSrc: Oral Oral Oral Oral   SpO2: 98% 99%  97%   Weight:       Height:         Ins/Outs:  Last 24hrs    Intake/Output Summary (Last 24 hours) at 3/5/2019 0656  Last data filed at 3/5/2019 0208  Gross per 24 hour   Intake 1080 ml   Output 475 ml   Net 605 ml     Physical Exam:  CONSTITUTIONAL: A&Ox3, No acute distress  LUNGS: Equal chest rise, no shortness of air  CARDIOVASCULAR: palpable peripheral pulses  WOUND: CRUZ Wound Vac System working in good condition, minimal drainage  EXTREMITY: Right Lower Extremity   Pulses: brisk capillary refill intact   Sensation: Sensation intact to light touch to the saphenous/sural/tibial/deep peroneal/superficial peroneal nerves, and grossly throughout the extremity.   Motor: 5/5 EHL/FHL/TA/GS motor complexes    Labs:   Lab Results   Component Value Date    HGB 12.2 03/03/2019     Lab Results   Component Value Date    WBC 10.98 (H) 03/03/2019     Lab Results   Component Value Date    GLUCOSE 119 (H) 03/03/2019    CALCIUM 8.3 (L) 03/03/2019     03/03/2019    K 3.6 03/03/2019    CO2 24.3 03/03/2019     03/03/2019    BUN 12 03/03/2019    CREATININE 0.45 (L) 03/03/2019    EGFRIFNONA 134 03/03/2019    BCR 26.7  (H) 03/03/2019    ANIONGAP 15.7 03/03/2019         Radiology: No radiology results for the last day  Assessment: Patient is a 79 y.o. female who is 1 Day Post-Op s/p Procedure(s):  TOTAL HIP ARTHROPLASTY ANTERIOR WITH HANA TABLE   - Weight Bearing: Right Lower Extremity Weight Bearing As Tolerated  - Labs: Above lab values review. Plan: I am ordering a CBC and BMP for this morning  - PT/OT: To Mobilize  - DVT PPX: Eliquis 2.5mg BID  - Post-Op Xray: BABAR implants in good position. Leg lengths acceptable.   - Dispo: Further mobilization needed.  Likely SNF in a few days.      Michael Gillespie II, MD  Orthopaedic Surgery  [facility]

## 2019-03-06 PROCEDURE — 97110 THERAPEUTIC EXERCISES: CPT | Performed by: PHYSICAL THERAPIST

## 2019-03-06 RX ADMIN — BUDESONIDE 6 MG: 3 CAPSULE, GELATIN COATED ORAL at 08:32

## 2019-03-06 RX ADMIN — HYDROCODONE BITARTRATE AND ACETAMINOPHEN 1 TABLET: 5; 325 TABLET ORAL at 08:31

## 2019-03-06 RX ADMIN — SENNOSIDES AND DOCUSATE SODIUM 2 TABLET: 8.6; 5 TABLET ORAL at 08:31

## 2019-03-06 RX ADMIN — APIXABAN 2.5 MG: 2.5 TABLET, FILM COATED ORAL at 20:52

## 2019-03-06 RX ADMIN — APIXABAN 2.5 MG: 2.5 TABLET, FILM COATED ORAL at 08:31

## 2019-03-06 RX ADMIN — HYDROCODONE BITARTRATE AND ACETAMINOPHEN 1 TABLET: 5; 325 TABLET ORAL at 14:39

## 2019-03-06 RX ADMIN — HYDROCODONE BITARTRATE AND ACETAMINOPHEN 1 TABLET: 5; 325 TABLET ORAL at 20:52

## 2019-03-06 RX ADMIN — Medication 1 CAPSULE: at 08:31

## 2019-03-06 NOTE — PROGRESS NOTES
Continued Stay Note  [facility]     Patient Name: Samantha Davila  MRN: [id number]  Today's Date: 3/6/2019    Admit Date: 3/2/2019    Discharge Plan     Row Name 03/06/19 1644       Plan    Plan  [facility], bed ready Thur 3/7     Plan Comments  [facility] will accept, bed ready Thur 3/7 per Marie. Transfer packet in [facility].         Discharge Codes    No documentation.             Michelle Quevedo RN

## 2019-03-06 NOTE — PROGRESS NOTES
ORTHOPAEDIC SURGERY DAILY PROGRESS NOTE  Patient is a 79 y.o. female who is 2 Days Post-Op s/p Procedure(s):  TOTAL HIP ARTHROPLASTY ANTERIOR WITH HANA TABLE     P789/1 Samantha Davila Age:79 y.o. MRN:[id number]  Admitted: 3/2/2019  Overnight events: Having difficulty voiding this morning.  Cruz wound VAC changed.  1 febrile episode to 101  Pain: Pain well controlled with current pain regiment.   Ambulation/Activity: Did get therapy yesterday but very limited mobility  Vitals:  Vitals:    03/05/19 1450 03/05/19 1910 03/05/19 2359 03/06/19 0341   BP: 97/54 120/66 129/73 128/71   BP Location: Left arm Left arm Left arm Left arm   Patient Position: Sitting Lying Lying Lying   Pulse: 90 101 95 85   Resp: 16 16 16 16   Temp: 97.4 °F (36.3 °C) 100 °F (37.8 °C) (!) 101 °F (38.3 °C) 100.3 °F (37.9 °C)   TempSrc: Oral Oral Oral Oral   SpO2: 94% 96% 94% 99%   Weight:       Height:         Ins/Outs:  Last 24hrs    Intake/Output Summary (Last 24 hours) at 3/6/2019 0712  Last data filed at 3/5/2019 2118  Gross per 24 hour   Intake 720 ml   Output --   Net 720 ml     Physical Exam:  CONSTITUTIONAL: A&Ox3, No acute distress  LUNGS: Equal chest rise, no shortness of air  CARDIOVASCULAR: palpable peripheral pulses  WOUND: CRUZ Wound Vac System working in good condition, minimal drainage  EXTREMITY: Right Lower Extremity   Pulses: brisk capillary refill intact   Sensation: Sensation intact to light touch to the saphenous/sural/tibial/deep peroneal/superficial peroneal nerves, and grossly throughout the extremity.   Motor: 5/5 EHL/FHL/TA/GS motor complexes    Labs:   Lab Results   Component Value Date    HGB 11.3 (L) 03/05/2019     Lab Results   Component Value Date    WBC 12.44 (H) 03/05/2019     Lab Results   Component Value Date    GLUCOSE 122 (H) 03/05/2019    CALCIUM 8.1 (L) 03/05/2019     03/05/2019    K 3.9 03/05/2019    CO2 28.3 03/05/2019    CL 99 03/05/2019    BUN 13 03/05/2019    CREATININE 0.53 (L) 03/05/2019     EGFRIFNONA 111 03/05/2019    BCR 24.5 03/05/2019    ANIONGAP 8.7 03/05/2019         Radiology: No radiology results for the last day  Assessment: Patient is a 79 y.o. female who is 2 Days Post-Op s/p Procedure(s):  TOTAL HIP ARTHROPLASTY ANTERIOR WITH HANA TABLE   - Weight Bearing: Right Lower Extremity Weight Bearing As Tolerated  - Labs: Above lab values review. Plan: No new labs this morning  - PT/OT: To Mobilize  - DVT PPX: Eliquis 2.5mg BID  - Post-Op Xray: BABAR implants in good position. Leg lengths acceptable.   - Dispo: Further mobilization needed.  Likely SNF in a few days.      Michael Gillespie II, MD  Orthopaedic Surgery  [facility]

## 2019-03-06 NOTE — PROGRESS NOTES
[facility]     LOS: 4 days     Subjective:    CC:Hip Injury (Fall from 2nd step from bottom stair; Right sided hip pain; Shortened and rotated.  Pt denies previous trauma.  No LOC; No blood thinners; Did strike her face; Laceration to lip. ) and Fall    DIET:  Diet Order   Procedures   • Diet Regular      feels she is still confused  No new complaints  Hip pain is controlled  Doing well post-op  Working with PT, able to stand    Objective:    Vital Signs:  Temp:  [97.8 °F (36.6 °C)-101 °F (38.3 °C)] 98.9 °F (37.2 °C)  Heart Rate:  [] 94  Resp:  [16] 16  BP: (108-138)/(62-81) 125/62    SpO2:  [94 %-99 %] 98 %  on   ;   Device (Oxygen Therapy): room air  Body mass index is 20.85 kg/m².    Physical Exam   Constitutional: She appears well-developed and well-nourished.   HENT:   Head: Normocephalic and atraumatic.   Cardiovascular: Exam reveals no friction rub.   No murmur heard.  Pulmonary/Chest: Effort normal and breath sounds normal.   Abdominal: Soft. Bowel sounds are normal. She exhibits no distension. There is no tenderness.   Musculoskeletal:   Pain with movement of the right leg   Neurological: She is alert.   Skin: Skin is warm and dry.   feet neurovascularly intact    Results Review:    Glucose   Date Value Ref Range Status   03/05/2019 122 (H) 65 - 99 mg/dL Final     Results from last 7 days   Lab Units 03/05/19  0727   WBC 10*3/mm3 12.44*   HEMOGLOBIN g/dL 11.3*   HEMATOCRIT % 34.9   PLATELETS 10*3/mm3 251     Results from last 7 days   Lab Units 03/05/19  0727 03/03/19  0516   SODIUM mmol/L 136 142   POTASSIUM mmol/L 3.9 3.6   CHLORIDE mmol/L 99 102   CO2 mmol/L 28.3 24.3   BUN mg/dL 13 12   CREATININE mg/dL 0.53* 0.45*   CALCIUM mg/dL 8.1* 8.3*   BILIRUBIN mg/dL  --  0.4   ALK PHOS U/L  --  63   ALT (SGPT) U/L  --  32   AST (SGOT) U/L  --  53*   GLUCOSE mg/dL 122* 119*     Results from last 7 days   Lab Units 03/02/19  2213   INR  1.04   APTT seconds 23.9              Cultures:       I have reviewed daily medications and changes in CPOE    Scheduled meds    apixaban 2.5 mg Oral Q12H   Budesonide 6 mg Oral Daily   lactobacillus acidophilus 1 capsule Oral Daily   sodium chloride 3 mL Intravenous Q12H         lactated ringers 9 mL/hr Last Rate: Stopped (03/04/19 1725)   sodium chloride 75 mL/hr Last Rate: 75 mL/hr (03/04/19 0631)     PRN meds  •  acetaminophen  •  HYDROcodone-acetaminophen  •  HYDROmorphone **AND** naloxone  •  ondansetron **OR** ondansetron ODT **OR** ondansetron  •  sennosides-docusate sodium  •  [COMPLETED] Insert peripheral IV **AND** sodium chloride  •  sodium chloride  •  zolpidem        Closed fracture of neck of right femur (CMS/HCC)    Insomnia    Fall        Assessment/Plan:    · Right femur fracture:  IV fluids stopped, taking good po. S/p surgery. lortab prn, Fall  · Insomnia: We will continue her home Ambien.  · collageous coitis- entocort  · Full code    DVT PPX: eliquis 2.5    Maybe d/c friday      Wiliam Pereyra MD  03/06/19  4:13 PM

## 2019-03-06 NOTE — PLAN OF CARE
Problem: Patient Care Overview  Goal: Plan of Care Review  Outcome: Ongoing (interventions implemented as appropriate)   03/06/19 1640   Coping/Psychosocial   Plan of Care Reviewed With patient   OTHER   Outcome Summary Pt walked 10' using rolling walker and min A this afternoon. Pt also completed R BABAR protocol x5 reps with assist. Gait speed was much decreased and little R LE clearance was noted throughout. Continuous verbal and tactile cueing required for walker management. PT will continue to follow to address strength, gait, and functional mobility.

## 2019-03-06 NOTE — THERAPY TREATMENT NOTE
Acute Care - Physical Therapy Treatment Note  [facility]     Patient Name: Samantha Davila  : 1940  MRN: [id number]  Today's Date: 3/6/2019             Admit Date: 3/2/2019    Visit Dx:    ICD-10-CM ICD-9-CM   1. Closed fracture of neck of right femur, initial encounter (CMS/[facility]) S72.001A 820.8     Patient Active Problem List   Diagnosis   • Neurodermatitis   • Insomnia   • Viral upper respiratory tract infection   • Closed fracture of neck of right femur (CMS/[facility])   • Fall       Therapy Treatment    Rehabilitation Treatment Summary     Row Name 19 1630 19 1110          Treatment Time/Intention    Discipline  physical therapist  (Pended)   -CG  physical therapist  -     Document Type  therapy note (daily note)  (Pended)   -CG  therapy note (daily note)  -     Subjective Information  complains of;pain  (Pended)   -CG  complains of;pain  -KH     Mode of Treatment  physical therapy  (Pended)   -CG  physical therapy  -KH     Patient/Family Observations  Pt seated in chair, no signs of acute distress at rest,  present  (Pended)   -CG2  --     Therapy Frequency (PT Clinical Impression)  2 times/day  (Pended)   -CG  2 times/day  -     Patient Effort  good  (Pended)   -CG  adequate  -KH     Existing Precautions/Restrictions  fall  (Pended)   -CG  fall  -KH     Recorded by [CG] Dandre Reddy, PT Student 19 1633  [CG2] Dandre Reddy, PT Student 19 1639 [KH] Nupur Mora, PT 19 1211     Row Name 19 1630             Cognitive Assessment/Intervention- PT/OT    Orientation Status (Cognition)  oriented x 3  (Pended)   -CG      Follows Commands (Cognition)  follows one step commands;75-90% accuracy;repetition of directions required  (Pended)   -CG      Personal Safety Interventions  fall prevention program maintained;gait belt;nonskid shoes/slippers when out of bed  (Pended)   -CG      Recorded by [CG] Dandre Reddy, PT Student 19 1817       Row Name 03/06/19 1630 03/06/19 1110          Bed Mobility Assessment/Treatment    Supine-Sit [place] (Bed Mobility)  --  moderate assist (50% patient effort)  -KH     Comment (Bed Mobility)  not tested- in chair  (Pended)   -  --     Recorded by [CG] Dandre Reddy, PT Student 03/06/19 1639 [KH] Nupur Mora, PT 03/06/19 1211     Row Name 03/06/19 1630 03/06/19 1110          Sit-Stand Transfer    Sit-Stand [place] (Transfers)  moderate assist (50% patient effort);verbal cues;nonverbal cues (demo/gesture)  (Pended)   -CG  moderate assist (50% patient effort)  -     Assistive Device (Sit-Stand Transfers)  walker, front-wheeled  (Pended)   -  walker, front-wheeled  -KH     Recorded by [CG] Dandre Reddy, PT Student 03/06/19 1639 [KH] Nupur Mora, PT 03/06/19 1211     Row Name 03/06/19 1630 03/06/19 1110          Stand-Sit Transfer    Stand-Sit [place] (Transfers)  moderate assist (50% patient effort);nonverbal cues (demo/gesture);verbal cues  (Pended)   -  moderate assist (50% patient effort)  -     Assistive Device (Stand-Sit Transfers)  walker, front-wheeled  (Pended)   -  walker, front-wheeled  -KH     Recorded by [CG] Dandre Reddy, PT Student 03/06/19 1639 [] Nupur Mora, PT 03/06/19 1211     Row Name 03/06/19 1630 03/06/19 1110          Gait/Stairs Assessment/Training    [place] Level (Gait)  minimum assist (75% patient effort);verbal cues;nonverbal cues (demo/gesture)  (Pended)   -  minimum assist (75% patient effort)  -     Assistive Device (Gait)  walker, front-wheeled  (Pended)   -  walker, front-wheeled  -KH     Distance in Feet (Gait)  10  (Pended)   -  5  -KH     Pattern (Gait)  step-to  (Pended)   -  step-to  -KH     Deviations/Abnormal Patterns (Gait)  gait speed decreased;stride length decreased;antalgic  (Pended)   -CG  gait speed decreased;stride length decreased  -KH     Bilateral Gait Deviations  forward  "flexed posture  (Pended)   -CG  forward flexed posture;heel strike decreased  -KH     Right Sided Gait Deviations  weight shift ability decreased  (Pended)  much decreased hip and knee flexion  -CG  weight shift ability decreased  -KH     Comment (Gait/Stairs)  stated \"burning\" in R hip throughout gait, shuffling of R foot with minimal to no clearance. Persistent VCs and assist required for hand placement on walker, sequencing walker and steps.  (Pended)   -CG  assist to advance RLE and for walker placement  -KH     Recorded by [CG] Dandre Reddy, PT Student 03/06/19 1639 [KH] Nupur Mora, PT 03/06/19 1211     Row Name 03/06/19 1630 03/06/19 1110          Therapeutic Exercise    Comment (Therapeutic Exercise)  R BABAR protocol x5 reps in chair, AAROM  (Pended)   -CG  R BABAR protocol x 15 reps, AAROM  -KH     Recorded by [CG] Dandre Reddy, PT Student 03/06/19 1639 [KH] Nupur Mora, PT 03/06/19 1219     Row Name 03/06/19 1630 03/06/19 1110          Positioning and Restraints    Pre-Treatment Position  sitting in chair/recliner  (Pended)   -CG  in bed  -KH     Post Treatment Position  chair  (Pended)   -CG  chair  -KH     In Chair  reclined;call light within reach;encouraged to call for assist  (Pended)   -CG  reclined;call light within reach;encouraged to call for assist;with family/caregiver;notified nsg  -KH     Recorded by [CG] Dandre Reddy, PT Student 03/06/19 1639 [KH] Nupur Mora, PT 03/06/19 1219     Row Name 03/06/19 1630             Pain Assessment    Additional Documentation  Pain Scale: Word Pre/Post-Treatment (Group)  (Pended)   -CG      Recorded by [CG] Dandre Reddy, PT Student 03/06/19 1639      Row Name 03/06/19 1110             Pain Scale: Numbers Pre/Post-Treatment    Pain Scale: Numbers, Pretreatment  5/10  -KH      Pain Scale: Numbers, Post-Treatment  7/10  -KH      Pain Location - Side  Right  -KH      Pain Location  hip  -KH      Pain " Intervention(s)  Repositioned  -KH      Recorded by [TYLER] Nupur Mora, PT 03/06/19 1219      Row Name 03/06/19 1630             Pain Scale: Word Pre/Post-Treatment    Pain: Word Scale, Pretreatment  4 - moderate pain  (Pended)   -CG      Pain: Word Scale, Post-Treatment  4 - moderate pain  (Pended)   -CG      Recorded by [CG] Dandre Reddy, PT Student 03/06/19 1640      Row Name                Wound 03/04/19 1708 Right hip incision    Wound - Properties Group Date first assessed: 03/04/19 [VB] Time first assessed: 1708 [VB] Side: Right [VB] Location: hip [VB] Type: incision [VB] Recorded by:  [VB] Sherrie Brock RN 03/04/19 1708    Row Name 03/06/19 1630 03/06/19 1110          Plan of Care Review    Plan of Care Reviewed With  patient  (Pended)   -CG  patient  -KH     Recorded by [CG] Dandre Reddy, PT Student 03/06/19 1640 [] uNpur Mora, PT 03/06/19 1219     Row Name 03/06/19 1630 03/06/19 1110          Outcome Summary/Treatment Plan (PT)    Anticipated Discharge Disposition (PT)  skilled nursing facility  (Pended)   -CG  skilled nursing facility  -KH     Recorded by [CG] Dandre Reddy, PT Student 03/06/19 1640 [] Nupur Mora, PT 03/06/19 1219       User Key  (r) = Recorded By, (t) = Taken By, (c) = Cosigned By    Initials Name Effective Dates Discipline    Nupur Delvalle, PT 02/05/19 -  PT    Sherrie Prasad RN 06/16/16 -  Nurse    CG Dandre Reddy, PT Student 02/04/19 -  PT          Wound 03/04/19 1708 Right hip incision (Active)   Dressing Appearance dry;intact;no drainage 3/6/2019  8:31 AM   Closure MARY 3/6/2019  8:31 AM   Base dressing in place, unable to visualize 3/6/2019  8:31 AM   Drainage Amount none 3/6/2019 12:12 PM   Care, Wound cleansed with;sterile normal saline 3/5/2019  9:18 PM   Dressing Care, Wound dressing applied 3/5/2019  9:18 PM           Physical Therapy Education     Title: PT OT SLP Therapies (Done)      Topic: Physical Therapy (Done)     Point: Mobility training (Done)     Learning Progress Summary           Patient Acceptance, E,TB,D, VU,NR by CG at 3/6/2019  4:40 PM    Acceptance, E,D, VU,NR by  at 3/6/2019 12:19 PM    Acceptance, E,D, VU,NR by  at 3/5/2019  1:44 PM                   Point: Home exercise program (Done)     Learning Progress Summary           Patient Acceptance, E,TB,D, VU,NR by CG at 3/6/2019  4:40 PM    Acceptance, E,D, VU,NR by  at 3/6/2019 12:19 PM    Acceptance, E,D, VU,NR by  at 3/5/2019  1:44 PM                   Point: Body mechanics (Done)     Learning Progress Summary           Patient Acceptance, E,TB,D, VU,NR by CG at 3/6/2019  4:40 PM    Acceptance, E,D, VU,NR by  at 3/6/2019 12:19 PM    Acceptance, E,D, VU,NR by  at 3/5/2019  1:44 PM                   Point: Precautions (Done)     Learning Progress Summary           Patient Acceptance, E,TB,D, VU,NR by CG at 3/6/2019  4:40 PM    Acceptance, E,D, VU,NR by  at 3/6/2019 12:19 PM    Acceptance, E,D, VU,NR by  at 3/5/2019  1:44 PM                               User Key     Initials Effective Dates Name Provider Type Discipline     02/05/19 -  Nupur Mora, PT Physical Therapist PT     02/04/19 -  Dandre Reddy PT Student PT Student PT                PT Recommendation and Plan  Anticipated Discharge Disposition (PT): (P) skilled nursing facility  Therapy Frequency (PT Clinical Impression): (P) 2 times/day  Outcome Summary/Treatment Plan (PT)  Anticipated Discharge Disposition (PT): (P) skilled nursing facility  Plan of Care Reviewed With: (P) patient  Outcome Summary: (P) Pt walked 10' using rolling walker and min A this afternoon. Pt also completed R BABAR protocol x5 reps with assist. Gait speed was much decreased and little R LE clearance was noted throughout. Continuous verbal and tactile cueing required for walker management. PT will continue to follow to address strength, gait, and functional  mobility.  Outcome Measures     Row Name 03/06/19 1600 03/06/19 1200 03/05/19 1346       How much help from another person do you currently need...    Turning from your back to your side while in flat bed without using bedrails?  2  (Pended)   -  2  -KH  2  -KH    Moving from lying on back to sitting on the side of a flat bed without bedrails?  2  (Pended)   -CG  2  -KH  2  -KH    Moving to and from a bed to a chair (including a wheelchair)?  3  (Pended)   -  3  -KH  1  -KH    Standing up from a chair using your arms (e.g., wheelchair, bedside chair)?  2  (Pended)   -CG  2  -KH  1  -KH    Climbing 3-5 steps with a railing?  1  (Pended)   -CG  1  -KH  1  -KH    To walk in hospital room?  3  (Pended)   -  3  -KH  1  -KH    AM-PAC 6 Clicks Score  13  (Pended)   -  13  -KH  8  -KH       Functional Assessment    Outcome Measure Options  AM-PAC 6 Clicks Basic Mobility (PT)  (Pended)   -  AM-PAC 6 Clicks Basic Mobility (PT)  -KH  AM-PAC 6 Clicks Basic Mobility (PT)  -KH      User Key  (r) = Recorded By, (t) = Taken By, (c) = Cosigned By    Initials Name Provider Type    Nupur Delvalle, PT Physical Therapist     Dandre Reddy, PT Student PT Student         Time Calculation:   PT Charges     Row Name 03/06/19 1643 03/06/19 1208          Time Calculation    Start Time  1607  (Pended)   -  1040  -     Stop Time  1630  (Pended)   -  1103  -     Time Calculation (min)  23 min  (Pended)   -  23 min  -     PT Received On  03/06/19  (Pended)   -CG  03/06/19  -KH     PT - Next Appointment  03/07/19  (Pended)   -  03/06/19  -KH        Time Calculation- PT    Total Timed Code Minutes- PT  23 minute(s)  (Pended)   -CG  23 minute(s)  -       User Key  (r) = Recorded By, (t) = Taken By, (c) = Cosigned By    Initials Name Provider Type    Nupur Delvalle, PT Physical Therapist    CG Dandre Reddy, PT Student PT Student        Therapy Suggested Charges     Code   Minutes  Charges    None           Therapy Charges for Today     Code Description Service Date Service Provider Modifiers Qty    97418176789 HC PT THER SUPP EA 15 MIN 3/6/2019 Dandre Reddy, PT Student GP 2          PT G-Codes  Outcome Measure Options: (P) AM-PAC 6 Clicks Basic Mobility (PT)  AM-PAC 6 Clicks Score: (P) 13    Dandre Reddy PT Student  3/6/2019

## 2019-03-06 NOTE — PLAN OF CARE
Problem: Patient Care Overview  Goal: Plan of Care Review  Outcome: Ongoing (interventions implemented as appropriate)   03/06/19 1835   Coping/Psychosocial   Plan of Care Reviewed With patient   Plan of Care Review   Progress improving   OTHER   Outcome Summary Patient stable throughout shift today. Pain well managed w/ po meds. No episodes of confusion noted. Martha dressing cdi. Patient transferring x2 assist using walker, very slowly and voiding per bsc. Tolerating regular diet, poor appetite. Rehab needed upon discharge. Cont to monitor.       Problem: Skin Injury Risk (Adult)  Goal: Skin Health and Integrity  Outcome: Ongoing (interventions implemented as appropriate)   03/06/19 1835   Skin Injury Risk (Adult)   Skin Health and Integrity making progress toward outcome       Problem: Fracture Orthopaedic (Adult)  Goal: Signs and Symptoms of Listed Potential Problems Will be Absent, Minimized or Managed (Fracture Orthopaedic)  Outcome: Ongoing (interventions implemented as appropriate)   03/06/19 1835   Goal/Outcome Evaluation   Problems Assessed (Orthopaedic Fracture) all   Problems Present (Orthopaedic Fracture) pain;functional deficit/self-care deficit

## 2019-03-06 NOTE — PLAN OF CARE
Problem: Patient Care Overview  Goal: Plan of Care Review   03/06/19 1219   OTHER   Outcome Summary Pt is more alert with improved activity tolerance. Pt was able to complete R BABAR protocol x 15 reps with assist. She demonstrated improved bed mobility and was able to ambulate about 5 ft with Min A. Pain is still limited WB tolerance on RLE and pt fatigues quickly. Will progress as tolerated.

## 2019-03-06 NOTE — PLAN OF CARE
Problem: Fall Risk (Adult)  Goal: Absence of Fall  Outcome: Ongoing (interventions implemented as appropriate)      Problem: Patient Care Overview  Goal: Plan of Care Review  Outcome: Ongoing (interventions implemented as appropriate)   03/06/19 0656   Coping/Psychosocial   Plan of Care Reviewed With patient   Plan of Care Review   Progress no change   OTHER   Outcome Summary Elevated tempX1. IS strongly encouraged. Intermittent confusion to time and situation noted. Removed CRUZ dressing-successfully applied new CRUZ. removed IV site. Rested well through shift, Unable to void this am       Problem: Skin Injury Risk (Adult)  Goal: Skin Health and Integrity  Outcome: Ongoing (interventions implemented as appropriate)      Problem: Fracture Orthopaedic (Adult)  Goal: Signs and Symptoms of Listed Potential Problems Will be Absent, Minimized or Managed (Fracture Orthopaedic)  Outcome: Ongoing (interventions implemented as appropriate)

## 2019-03-06 NOTE — THERAPY TREATMENT NOTE
Acute Care - Physical Therapy Treatment Note  [facility]     Patient Name: Samantha Davila  : 1940  MRN: [id number]  Today's Date: 3/6/2019             Admit Date: 3/2/2019    Visit Dx:    ICD-10-CM ICD-9-CM   1. Closed fracture of neck of right femur, initial encounter (CMS/[facility]) S72.001A 820.8     Patient Active Problem List   Diagnosis   • Neurodermatitis   • Insomnia   • Viral upper respiratory tract infection   • Closed fracture of neck of right femur (CMS/[facility])   • Fall       Therapy Treatment    Rehabilitation Treatment Summary     Row Name 19 1110             Treatment Time/Intention    Discipline  physical therapist  -KH      Document Type  therapy note (daily note)  -TYLER      Subjective Information  complains of;pain  -TYLER      Mode of Treatment  physical therapy  -TYLER      Therapy Frequency (PT Clinical Impression)  2 times/day  -TYLER      Patient Effort  adequate  -      Existing Precautions/Restrictions  fall  -KH      Recorded by [TYLER] Nupur Mora, PT 19 1211      Row Name 19 1110             Bed Mobility Assessment/Treatment    Supine-Sit [place] (Bed Mobility)  moderate assist (50% patient effort)  -KH      Recorded by [KH] Nupur Mora, PT 19 1211      Row Name 19 1110             Sit-Stand Transfer    Sit-Stand [place] (Transfers)  moderate assist (50% patient effort)  -      Assistive Device (Sit-Stand Transfers)  walker, front-wheeled  -KH      Recorded by [TYLER] Nupur Mora, PT 19 1211      Row Name 19 1110             Stand-Sit Transfer    Stand-Sit [place] (Transfers)  moderate assist (50% patient effort)  -      Assistive Device (Stand-Sit Transfers)  walker, front-wheeled  -TYLER      Recorded by [TYLER] Nupur Mora, PT 19 1211      Row Name 19 1110             Gait/Stairs Assessment/Training    [place] Level (Gait)  minimum assist (75% patient effort)  -      Assistive  Device (Gait)  walker, front-wheeled  -KH      Distance in Feet (Gait)  5  -KH      Pattern (Gait)  step-to  -KH      Deviations/Abnormal Patterns (Gait)  gait speed decreased;stride length decreased  -KH      Bilateral Gait Deviations  forward flexed posture;heel strike decreased  -KH      Right Sided Gait Deviations  weight shift ability decreased  -KH      Comment (Gait/Stairs)  assist to advance RLE and for walker placement  -KH      Recorded by [TYLER] Nupur Mora, PT 03/06/19 1211      Row Name 03/06/19 1110             Therapeutic Exercise    Comment (Therapeutic Exercise)  R BABAR protocol x 15 reps, AAROM  -KH      Recorded by [TYLER] Nupur Mora, PT 03/06/19 1219      Row Name 03/06/19 1110             Positioning and Restraints    Pre-Treatment Position  in bed  -KH      Post Treatment Position  chair  -KH      In Chair  reclined;call light within reach;encouraged to call for assist;with family/caregiver;notified nsg  -KH      Recorded by [TYLER] Nupur Mora, PT 03/06/19 1219      Row Name 03/06/19 1110             Pain Scale: Numbers Pre/Post-Treatment    Pain Scale: Numbers, Pretreatment  5/10  -KH      Pain Scale: Numbers, Post-Treatment  7/10  -KH      Pain Location - Side  Right  -KH      Pain Location  hip  -KH      Pain Intervention(s)  Repositioned  -KH      Recorded by [TYLER] Nupur Mora, PT 03/06/19 1219      Row Name                Wound 03/04/19 1708 Right hip incision    Wound - Properties Group Date first assessed: 03/04/19 [VB] Time first assessed: 1708 [VB] Side: Right [VB] Location: hip [VB] Type: incision [VB] Recorded by:  [VB] Sherrie Brock RN 03/04/19 1708    Row Name 03/06/19 1110             Plan of Care Review    Plan of Care Reviewed With  patient  -KH      Recorded by [TYLER] Nupur Mora, PT 03/06/19 1219      Row Name 03/06/19 1110             Outcome Summary/Treatment Plan (PT)    Anticipated Discharge Disposition (PT)  skilled  nursing facility  -      Recorded by [TYLER] Nupur Mora, PT 03/06/19 1219        User Key  (r) = Recorded By, (t) = Taken By, (c) = Cosigned By    Initials Name Effective Dates Discipline    Nupur Delvalle, PT 02/05/19 -  PT    Sherrie Prasad, RN 06/16/16 -  Nurse          Wound 03/04/19 1709 Right hip incision (Active)   Dressing Appearance dry;intact;no drainage 3/6/2019  8:31 AM   Closure MARY 3/6/2019  8:31 AM   Base dressing in place, unable to visualize 3/6/2019  8:31 AM   Drainage Amount none 3/6/2019  8:31 AM   Care, Wound cleansed with;sterile normal saline 3/5/2019  9:18 PM   Dressing Care, Wound dressing applied 3/5/2019  9:18 PM           Physical Therapy Education     Title: PT OT SLP Therapies (Done)     Topic: Physical Therapy (Done)     Point: Mobility training (Done)     Learning Progress Summary           Patient Acceptance, E,D, VU,NR by  at 3/6/2019 12:19 PM    Acceptance, E,D, VU,NR by  at 3/5/2019  1:44 PM                   Point: Home exercise program (Done)     Learning Progress Summary           Patient Acceptance, E,D, VU,NR by  at 3/6/2019 12:19 PM    Acceptance, E,D, VU,NR by  at 3/5/2019  1:44 PM                   Point: Body mechanics (Done)     Learning Progress Summary           Patient Acceptance, E,D, VU,NR by  at 3/6/2019 12:19 PM    Acceptance, E,D, VU,NR by  at 3/5/2019  1:44 PM                   Point: Precautions (Done)     Learning Progress Summary           Patient Acceptance, E,D, VU,NR by  at 3/6/2019 12:19 PM    Acceptance, E,D, VU,NR by  at 3/5/2019  1:44 PM                               User Key     Initials Effective Dates Name Provider Type Discipline     02/05/19 -  Nupur Mora, PT Physical Therapist PT                PT Recommendation and Plan  Anticipated Discharge Disposition (PT): skilled nursing facility  Planned Therapy Interventions (PT Eval): balance training, bed mobility training, gait training, home  exercise program, patient/family education, strengthening, transfer training  Therapy Frequency (PT Clinical Impression): 2 times/day  Outcome Summary/Treatment Plan (PT)  Anticipated Discharge Disposition (PT): skilled nursing facility  Plan of Care Reviewed With: patient  Outcome Summary: Pt is more alert with improved activity tolerance. Pt was able to complete R BABAR protocol x 15 reps with assist. She demonstrated improved bed mobility and was able to ambulate about 5 ft with Min A. Pain is still limited WB tolerance on RLE and pt fatigues quickly. Will progress as tolerated.   Outcome Measures     Row Name 03/06/19 1200 03/05/19 1346          How much help from another person do you currently need...    Turning from your back to your side while in flat bed without using bedrails?  2  -KH  2  -KH     Moving from lying on back to sitting on the side of a flat bed without bedrails?  2  -KH  2  -KH     Moving to and from a bed to a chair (including a wheelchair)?  3  -KH  1  -KH     Standing up from a chair using your arms (e.g., wheelchair, bedside chair)?  2  -KH  1  -KH     Climbing 3-5 steps with a railing?  1  -KH  1  -KH     To walk in hospital room?  3  -KH  1  -KH     AM-PAC 6 Clicks Score  13  -KH  8  -KH        Functional Assessment    Outcome Measure Options  AM-PAC 6 Clicks Basic Mobility (PT)  -KH  AM-PAC 6 Clicks Basic Mobility (PT)  -       User Key  (r) = Recorded By, (t) = Taken By, (c) = Cosigned By    Initials Name Provider Type    Nupur Delvalle, PT Physical Therapist         Time Calculation:   PT Charges     Row Name 03/06/19 1208             Time Calculation    Start Time  1040  -KH      Stop Time  1103  -KH      Time Calculation (min)  23 min  -KH      PT Received On  03/06/19  -      PT - Next Appointment  03/06/19  -         Time Calculation- PT    Total Timed Code Minutes- PT  23 minute(s)  -        User Key  (r) = Recorded By, (t) = Taken By, (c) = Cosigned By     Initials Name Provider Type     Nupur Mora, PT Physical Therapist        Therapy Suggested Charges     Code   Minutes Charges    None           Therapy Charges for Today     Code Description Service Date Service Provider Modifiers Qty    68498582452 HC PT EVAL MOD COMPLEXITY 2 3/5/2019 Nupur Mora, PT GP 1    68914739527 HC PT THER PROC EA 15 MIN 3/5/2019 Nupur Mora, PT GP 1    92861038453 HC PT THER PROC EA 15 MIN 3/5/2019 Nupur Mora, PT GP 1    29876015408 HC PT THER SUPP EA 15 MIN 3/5/2019 Nupur Mora, PT GP 1    20530268658 HC PT THER PROC EA 15 MIN 3/6/2019 Nupur Mora, PT GP 2          PT G-Codes  Outcome Measure Options: AM-PAC 6 Clicks Basic Mobility (PT)  AM-PAC 6 Clicks Score: 13    Nupur Mora, PT  3/6/2019

## 2019-03-06 NOTE — PLAN OF CARE
Problem: Fall Risk (Adult)  Goal: Absence of Fall  Outcome: Ongoing (interventions implemented as appropriate)      Problem: Patient Care Overview  Goal: Plan of Care Review  Outcome: Ongoing (interventions implemented as appropriate)   03/1940   Coping/Psychosocial   Plan of Care Reviewed With patient;family   Plan of Care Review   Progress improving   OTHER   Outcome Summary POD# 1 of R BABAR. A&OX4. C/O pain. Tolerating oral pain medication for pain. Stated she was in alot of pain earlier, po was too early to give. IV diluadid given and tolerated. Tolerating po pills now. Encouraged and educated to stay on top of pain medication. Educated that it will hurt when moving. Dressing to right hip c/d/i. Good sensation and motion to ble. Assist x 2 to transfer. Voiding on her own. Vitals are stable. Rehab in a couple of days.      Goal: Individualization and Mutuality  Outcome: Ongoing (interventions implemented as appropriate)    Goal: Discharge Needs Assessment  Outcome: Ongoing (interventions implemented as appropriate)    Goal: Interprofessional Rounds/Family Conf  Outcome: Ongoing (interventions implemented as appropriate)      Problem: Skin Injury Risk (Adult)  Goal: Skin Health and Integrity  Outcome: Ongoing (interventions implemented as appropriate)      Problem: Fracture Orthopaedic (Adult)  Goal: Signs and Symptoms of Listed Potential Problems Will be Absent, Minimized or Managed (Fracture Orthopaedic)  Outcome: Ongoing (interventions implemented as appropriate)

## 2019-03-07 PROCEDURE — 97110 THERAPEUTIC EXERCISES: CPT

## 2019-03-07 RX ORDER — POLYETHYLENE GLYCOL 3350 17 G/17G
17 POWDER, FOR SOLUTION ORAL DAILY
Status: DISCONTINUED | OUTPATIENT
Start: 2019-03-07 | End: 2019-03-08 | Stop reason: HOSPADM

## 2019-03-07 RX ORDER — BISACODYL 10 MG
10 SUPPOSITORY, RECTAL RECTAL DAILY PRN
Status: DISCONTINUED | OUTPATIENT
Start: 2019-03-07 | End: 2019-03-08 | Stop reason: HOSPADM

## 2019-03-07 RX ORDER — CETIRIZINE HYDROCHLORIDE, PSEUDOEPHEDRINE HYDROCHLORIDE 5; 120 MG/1; MG/1
1 TABLET, FILM COATED, EXTENDED RELEASE ORAL 2 TIMES DAILY PRN
Status: DISCONTINUED | OUTPATIENT
Start: 2019-03-07 | End: 2019-03-08 | Stop reason: HOSPADM

## 2019-03-07 RX ADMIN — HYDROCODONE BITARTRATE AND ACETAMINOPHEN 1 TABLET: 5; 325 TABLET ORAL at 06:13

## 2019-03-07 RX ADMIN — BUDESONIDE 6 MG: 3 CAPSULE, GELATIN COATED ORAL at 08:19

## 2019-03-07 RX ADMIN — APIXABAN 2.5 MG: 2.5 TABLET, FILM COATED ORAL at 21:53

## 2019-03-07 RX ADMIN — CETIRIZINE HYDROCHLORIDE AND PSEUDOEPHEDRINE HYDROCHLORIDE 1 TABLET: 5; 120 TABLET, FILM COATED, EXTENDED RELEASE ORAL at 23:34

## 2019-03-07 RX ADMIN — HYDROCODONE BITARTRATE AND ACETAMINOPHEN 1 TABLET: 5; 325 TABLET ORAL at 19:33

## 2019-03-07 RX ADMIN — POLYETHYLENE GLYCOL 3350 17 G: 17 POWDER, FOR SOLUTION ORAL at 13:05

## 2019-03-07 RX ADMIN — APIXABAN 2.5 MG: 2.5 TABLET, FILM COATED ORAL at 08:19

## 2019-03-07 RX ADMIN — SENNOSIDES AND DOCUSATE SODIUM 2 TABLET: 8.6; 5 TABLET ORAL at 08:19

## 2019-03-07 RX ADMIN — HYDROCODONE BITARTRATE AND ACETAMINOPHEN 1 TABLET: 5; 325 TABLET ORAL at 13:04

## 2019-03-07 RX ADMIN — Medication 1 CAPSULE: at 08:19

## 2019-03-07 RX ADMIN — SODIUM CHLORIDE, PRESERVATIVE FREE 3 ML: 5 INJECTION INTRAVENOUS at 08:19

## 2019-03-07 NOTE — PLAN OF CARE
Problem: Patient Care Overview  Goal: Plan of Care Review   03/07/19 1301   OTHER   Outcome Summary Pt requires 1 assist for mobility. Very slow khoa and requires cues for safety and sequencing throughout. Will continue to progress as tolerated.

## 2019-03-07 NOTE — PROGRESS NOTES
ORTHOPAEDIC SURGERY DAILY PROGRESS NOTE  Patient is a 79 y.o. female who is 3 Days Post-Op s/p Procedure(s):  TOTAL HIP ARTHROPLASTY ANTERIOR WITH HANA TABLE     P789/1 Samantha Davila Age:79 y.o. MRN:[id number]  Admitted: 3/2/2019  Overnight events: Slowly improving mobility.  Able to use the restroom better.  pain: Pain well controlled with current pain regiment.   Ambulation/Activity: Mobility is very slowly improving  Vitals:  Vitals:    03/06/19 1839 03/06/19 2355 03/07/19 0412 03/07/19 0713   BP: 110/64 102/61 101/59 100/57   BP Location: Left arm Left arm Left arm Left arm   Patient Position: Sitting Lying Lying Lying   Pulse: 89 85 88 85   Resp: 16 16 16 18   Temp: 97.6 °F (36.4 °C) 99 °F (37.2 °C) 98.6 °F (37 °C) 97.7 °F (36.5 °C)   TempSrc: Oral   Oral   SpO2: 100% 94% 96% 96%   Weight:       Height:         Ins/Outs:  Last 24hrs    Intake/Output Summary (Last 24 hours) at 3/7/2019 0728  Last data filed at 3/6/2019 2130  Gross per 24 hour   Intake 240 ml   Output 500 ml   Net -260 ml     Physical Exam:  CONSTITUTIONAL: A&Ox3, No acute distress  LUNGS: Equal chest rise, no shortness of air  CARDIOVASCULAR: palpable peripheral pulses  WOUND: CRUZ Wound Vac System working in good condition, minimal drainage  EXTREMITY: Right Lower Extremity   Pulses: brisk capillary refill intact   Sensation: Sensation intact to light touch to the saphenous/sural/tibial/deep peroneal/superficial peroneal nerves, and grossly throughout the extremity.   Motor: 5/5 EHL/FHL/TA/GS motor complexes    Labs:   Lab Results   Component Value Date    HGB 11.3 (L) 03/05/2019     Lab Results   Component Value Date    WBC 12.44 (H) 03/05/2019     Lab Results   Component Value Date    GLUCOSE 122 (H) 03/05/2019    CALCIUM 8.1 (L) 03/05/2019     03/05/2019    K 3.9 03/05/2019    CO2 28.3 03/05/2019    CL 99 03/05/2019    BUN 13 03/05/2019    CREATININE 0.53 (L) 03/05/2019    EGFRIFNONA 111 03/05/2019    BCR 24.5 03/05/2019    ANIONGAP 8.7  03/05/2019         Radiology: No radiology results for the last day  Assessment: Patient is a 79 y.o. female who is 3 Days Post-Op s/p Procedure(s):  TOTAL HIP ARTHROPLASTY ANTERIOR WITH HANA TABLE   - Weight Bearing: Right Lower Extremity Weight Bearing As Tolerated  - Labs: Above lab values review. Plan: No new labs this morning  - PT/OT: To Mobilize  - DVT PPX: Eliquis 2.5mg BID  - Post-Op Xray: BABAR implants in good position. Leg lengths acceptable.   - Dispo: Further mobilization needed.  SNF when bed available and cleared by medicine.      Michael Gillespie II, MD  Orthopaedic Surgery  [facility]

## 2019-03-07 NOTE — THERAPY TREATMENT NOTE
Acute Care - Physical Therapy Treatment Note  [facility]     Patient Name: Samantha Davila  : 1940  MRN: [id number]  Today's Date: 3/7/2019             Admit Date: 3/2/2019    Visit Dx:    ICD-10-CM ICD-9-CM   1. Closed fracture of neck of right femur, initial encounter (CMS/[facility]) S72.001A 820.8     Patient Active Problem List   Diagnosis   • Neurodermatitis   • Insomnia   • Viral upper respiratory tract infection   • Closed fracture of neck of right femur (CMS/[facility])   • Fall       Therapy Treatment    Rehabilitation Treatment Summary     Row Name 19 112             Treatment Time/Intention    Discipline  physical therapist  -MA      Document Type  therapy note (daily note)  -MA      Subjective Information  complains of;weakness;pain  -MA      Mode of Treatment  physical therapy;individual therapy  -MA      Patient/Family Observations  reclined in chair, no acute distress  -MA      Patient Effort  good  -MA      Existing Precautions/Restrictions  fall  -MA      Recorded by [MA] Kendy Choudhury, PT 19 1124      Row Name 19 112             Cognitive Assessment/Intervention- PT/OT    Orientation Status (Cognition)  oriented x 4  -MA      Follows Commands (Cognition)  follows one step commands  -MA      Personal Safety Interventions  fall prevention program maintained;gait belt;muscle strengthening facilitated;nonskid shoes/slippers when out of bed;supervised activity  -MA      Recorded by [MA] Kendy Choudhury, PT 19 1124      Row Name 19 112             Bed Mobility Assessment/Treatment    Comment (Bed Mobility)  NT- UI  -MA      Recorded by [MA] Kendy Choudhury, PT 19 1124      Row Name 19 112             Sit-Stand Transfer    Sit-Stand [place] (Transfers)  moderate assist (50% patient effort)  -MA      Assistive Device (Sit-Stand Transfers)  walker, front-wheeled  -MA      Recorded by [MA] Kendy Choudhury, PT 19 1124      Row Name 19 112             Stand-Sit  Transfer    Stand-Sit [place] (Transfers)  minimum assist (75% patient effort)  -MA      Assistive Device (Stand-Sit Transfers)  walker, front-wheeled  -MA      Recorded by [MA] Kendy Choudhury, PT 03/07/19 1124      Row Name 03/07/19 1121             Gait/Stairs Assessment/Training    [place] Level (Gait)  minimum assist (75% patient effort)  -MA      Assistive Device (Gait)  walker, front-wheeled  -MA      Distance in Feet (Gait)  10  -MA      Pattern (Gait)  step-to  -MA      Deviations/Abnormal Patterns (Gait)  khoa decreased;stride length decreased  -MA      Bilateral Gait Deviations  forward flexed posture  -MA      Right Sided Gait Deviations  weight shift ability decreased  -MA      Comment (Gait/Stairs)  very slow khoa, constant cues when to move walker and which foot to step with   -MA      Recorded by [MA] Kendy Choudhury, PT 03/07/19 1124      Row Name 03/07/19 1121             Therapeutic Exercise    Comment (Therapeutic Exercise)  R BABAR protocol x10   -MA      Recorded by [MA] Kendy Choudhury, PT 03/07/19 1124      Row Name 03/07/19 1121             Positioning and Restraints    Pre-Treatment Position  sitting in chair/recliner  -MA      Post Treatment Position  chair  -MA      In Chair  reclined;call light within reach;encouraged to call for assist no alarm on when entering room   -MA      Recorded by [MA] Kendy Choudhury, PT 03/07/19 1124      Row Name 03/07/19 1121             Pain Scale: Numbers Pre/Post-Treatment    Pain Scale: Numbers, Pretreatment  5/10  -MA      Pain Scale: Numbers, Post-Treatment  6/10  -MA      Pain Location - Side  Right  -MA      Pain Location  hip  -MA      Pain Intervention(s)  Cold applied;Repositioned;Ambulation/increased activity  -MA      Recorded by [MA] Kendy Choudhury, PT 03/07/19 1124      Row Name                Wound 03/04/19 1708 Right hip incision    Wound - Properties Group Date first assessed: 03/04/19 [VB] Time first assessed: 1708 [VB] Side: Right [VB]  Location: hip [VB] Type: incision [VB] Recorded by:  [VB] Sherrie Brock RN 03/04/19 1708      User Key  (r) = Recorded By, (t) = Taken By, (c) = Cosigned By    Initials Name Effective Dates Discipline    VB Sherrie Brock RN 06/16/16 -  Nurse    Kendy Mcdonald, PT 10/19/18 -  PT          Wound 03/04/19 1708 Right hip incision (Active)   Dressing Appearance dry;intact;no drainage 3/7/2019  8:18 AM   Closure MARY 3/7/2019  8:18 AM   Base dressing in place, unable to visualize 3/7/2019  8:18 AM   Drainage Amount none 3/7/2019  8:18 AM           Physical Therapy Education     Title: PT OT SLP Therapies (Done)     Topic: Physical Therapy (Done)     Point: Mobility training (Done)     Learning Progress Summary           Patient Acceptance, E, VU,NR by MA at 3/7/2019 11:24 AM    Acceptance, E,TB,D, VU,NR by CG at 3/6/2019  4:40 PM    Acceptance, E,D, VU,NR by TYLER at 3/6/2019 12:19 PM    Acceptance, E,D, VU,NR by TYLER at 3/5/2019  1:44 PM                   Point: Home exercise program (Done)     Learning Progress Summary           Patient Acceptance, E, VU,NR by MA at 3/7/2019 11:24 AM    Acceptance, E,TB,D, VU,NR by CG at 3/6/2019  4:40 PM    Acceptance, E,D, VU,NR by TYLER at 3/6/2019 12:19 PM    Acceptance, E,D, VU,NR by TYLER at 3/5/2019  1:44 PM                   Point: Body mechanics (Done)     Learning Progress Summary           Patient Acceptance, E, VU,NR by MA at 3/7/2019 11:24 AM    Acceptance, E,TB,D, VU,NR by CG at 3/6/2019  4:40 PM    Acceptance, E,D, VU,NR by TYLER at 3/6/2019 12:19 PM    Acceptance, E,D, VU,NR by TYLER at 3/5/2019  1:44 PM                   Point: Precautions (Done)     Learning Progress Summary           Patient Acceptance, E, VU,NR by MA at 3/7/2019 11:24 AM    Acceptance, E,TB,D, VU,NR by CG at 3/6/2019  4:40 PM    Acceptance, DA DAVID VU, NR by TYLER at 3/6/2019 12:19 PM    Acceptance, DA DAVID VU, NR by TYLER at 3/5/2019  1:44 PM                               User Key     Initials Effective Dates Name Provider  Type Discipline     02/05/19 -  Nupur Mora, PT Physical Therapist PT    MA 10/19/18 -  Kendy Choudhury, PT Physical Therapist PT     02/04/19 -  Dandre Reddy, PT Student PT Student PT                PT Recommendation and Plan     Outcome Summary: Pt requires 1 assist for mobility. Very slow khoa and requires cues for safety and sequencing throughout. Will continue to progress as tolerated.   Outcome Measures     Row Name 03/07/19 1300 03/06/19 1600 03/06/19 1200       How much help from another person do you currently need...    Turning from your back to your side while in flat bed without using bedrails?  2  -MA  2  -CH (r) CG (t) CH (c)  2  -KH    Moving from lying on back to sitting on the side of a flat bed without bedrails?  2  -MA  2  -CH (r) CG (t) CH (c)  2  -KH    Moving to and from a bed to a chair (including a wheelchair)?  3  -MA  3  -CH (r) CG (t) CH (c)  3  -KH    Standing up from a chair using your arms (e.g., wheelchair, bedside chair)?  3  -MA  2  -CH (r) CG (t) CH (c)  2  -KH    Climbing 3-5 steps with a railing?  1  -MA  1  -CH (r) CG (t) CH (c)  1  -KH    To walk in hospital room?  3  -MA  3  -CH (r) CG (t) CH (c)  3  -KH    AM-PAC 6 Clicks Score  14  -MA  13  -CH (r) CG (t)  13  -KH       Functional Assessment    Outcome Measure Options  AM-PAC 6 Clicks Basic Mobility (PT)  -MA  AM-PAC 6 Clicks Basic Mobility (PT)  -CH (r) CG (t) CH (c)  AM-PAC 6 Clicks Basic Mobility (PT)  -    Row Name 03/05/19 1346             How much help from another person do you currently need...    Turning from your back to your side while in flat bed without using bedrails?  2  -KH      Moving from lying on back to sitting on the side of a flat bed without bedrails?  2  -KH      Moving to and from a bed to a chair (including a wheelchair)?  1  -KH      Standing up from a chair using your arms (e.g., wheelchair, bedside chair)?  1  -KH      Climbing 3-5 steps with a railing?  1  -KH      To  walk in hospital room?  1  -      AM-PAC 6 Clicks Score  8  -         Functional Assessment    Outcome Measure Options  AM-PAC 6 Clicks Basic Mobility (PT)  -        User Key  (r) = Recorded By, (t) = Taken By, (c) = Cosigned By    Initials Name Provider Type    Nupur Delvalle, PT Physical Therapist    CH Simran Young, PT Physical Therapist    Kendy Mcdonald, PT Physical Therapist    Dandre Layne, PT Student PT Student         Time Calculation:   PT Charges     Row Name 03/07/19 1302 03/07/19 1121          Time Calculation    Start Time  1054  -MA  1054  -MA     Stop Time  1118  -MA  1118  -MA     Time Calculation (min)  24 min  -MA  24 min  -MA     PT Received On  --  03/07/19  -MA     PT - Next Appointment  --  03/07/19  -MA        Time Calculation- PT    Total Timed Code Minutes- PT  --  24 minute(s)  -MA       User Key  (r) = Recorded By, (t) = Taken By, (c) = Cosigned By    Initials Name Provider Type    Kendy Mcdonald, PT Physical Therapist        Therapy Suggested Charges     Code   Minutes Charges    None           Therapy Charges for Today     Code Description Service Date Service Provider Modifiers Qty    21667854426 HC PT THER PROC EA 15 MIN 3/7/2019 Kendy Choudhury, PT GP 2          PT G-Codes  Outcome Measure Options: AM-PAC 6 Clicks Basic Mobility (PT)  AM-PAC 6 Clicks Score: 14    Kendy Choudhury PT  3/7/2019

## 2019-03-07 NOTE — PROGRESS NOTES
"DAILY PROGRESS NOTE  [facility]    Patient Identification:  Name: Samantha Davila  Age: 79 y.o.  Sex: female  :  1940  MRN: [id number]         Primary Care Physician: Bobby Jimenez MD    Subjective:  Interval History:She complains of pain and constipation.    Objective:    Scheduled Meds:  apixaban 2.5 mg Oral Q12H   Budesonide 6 mg Oral Daily   lactobacillus acidophilus 1 capsule Oral Daily   polyethylene glycol 17 g Oral Daily   sodium chloride 3 mL Intravenous Q12H     Continuous Infusions:  lactated ringers 9 mL/hr Last Rate: Stopped (19 1725)   sodium chloride 75 mL/hr Last Rate: 75 mL/hr (19 0631)       Vital signs in last 24 hours:  Temp:  [97.5 °F (36.4 °C)-99 °F (37.2 °C)] 97.5 °F (36.4 °C)  Heart Rate:  [81-94] 81  Resp:  [16-18] 16  BP: ()/(56-64) 95/56    Intake/Output:    Intake/Output Summary (Last 24 hours) at 3/7/2019 1137  Last data filed at 3/6/2019 2130  Gross per 24 hour   Intake 240 ml   Output 500 ml   Net -260 ml       Exam:  BP 95/56 (BP Location: Left arm, Patient Position: Lying)   Pulse 81   Temp 97.5 °F (36.4 °C) (Oral)   Resp 16   Ht 157.5 cm (62\")   Wt 51.7 kg (114 lb)   SpO2 98%   BMI 20.85 kg/m²     General Appearance:    Alert, cooperative, no distress   Head:    Normocephalic, without obvious abnormality, atraumatic   Eyes:       Throat:   Lips, tongue, gums normal   Neck:   Supple, symmetrical, trachea midline, no JVD   Lungs:     Clear to auscultation bilaterally, respirations unlabored   Chest Wall:    No tenderness or deformity    Heart:    Regular rate and rhythm, S1 and S2 normal, no murmur,no  Rub or gallop   Abdomen:     Soft, non-tender, bowel sounds active, no masses, no organomegaly    Extremities:   Extremities normal, right hip surgical changes, no cyanosis or edema   Pulses:      Skin:   Skin is warm and dry,  no rashes or palpable lesions   Neurologic:   no focal deficits noted      Lab Results (last 72 hours)     " Procedure Component Value Units Date/Time    Basic Metabolic Panel [[id number]]  (Abnormal) Collected:  03/05/19 0727    Specimen:  Blood Updated:  03/05/19 0817     Glucose 122 mg/dL      BUN 13 mg/dL      Creatinine 0.53 mg/dL      Sodium 136 mmol/L      Potassium 3.9 mmol/L      Chloride 99 mmol/L      CO2 28.3 mmol/L      Calcium 8.1 mg/dL      eGFR Non African Amer 111 mL/min/1.73      BUN/Creatinine Ratio 24.5     Anion Gap 8.7 mmol/L     Narrative:       The MDRD GFR formula is only valid for adults with stable renal function between ages 18 and 70.    CBC (No Diff) [[id number]]  (Abnormal) Collected:  03/05/19 0727    Specimen:  Blood Updated:  03/05/19 0758     WBC 12.44 10*3/mm3      RBC 3.66 10*6/mm3      Hemoglobin 11.3 g/dL      Hematocrit 34.9 %      MCV 95.4 fL      MCH 30.9 pg      MCHC 32.4 g/dL      RDW 13.1 %      RDW-SD 45.8 fl      MPV 9.9 fL      Platelets 251 10*3/mm3         Data Review:  Results from last 7 days   Lab Units 03/05/19 0727 03/03/19  0516 03/02/19  2213   SODIUM mmol/L 136 142 140   POTASSIUM mmol/L 3.9 3.6 3.7   CHLORIDE mmol/L 99 102 100   CO2 mmol/L 28.3 24.3 26.5   BUN mg/dL 13 12 13   CREATININE mg/dL 0.53* 0.45* 0.59   GLUCOSE mg/dL 122* 119* 108*   CALCIUM mg/dL 8.1* 8.3* 8.9     Results from last 7 days   Lab Units 03/05/19 0727 03/03/19  0516 03/02/19  2213   WBC 10*3/mm3 12.44* 10.98* 14.82*   HEMOGLOBIN g/dL 11.3* 12.2 13.7   HEMATOCRIT % 34.9 37.8 42.7   PLATELETS 10*3/mm3 251 281 326             No results found for: TROPONINT      Results from last 7 days   Lab Units 03/03/19  0516 03/02/19  2213   ALK PHOS U/L 63 67   BILIRUBIN mg/dL 0.4 0.2   ALT (SGPT) U/L 32 31   AST (SGOT) U/L 53* 50*             No results found for: POCGLU  Results from last 7 days   Lab Units 03/02/19  2213   INR  1.04       Past Medical History:   Diagnosis Date   • Asthma    • Insomnia        Assessment:  Active Hospital Problems    Diagnosis  POA   • **Closed fracture of neck of right  femur (CMS/HCC) [S72.001A]  Yes   • Fall [W19.XXXA]  Yes   • Insomnia [G47.00]  Yes      Resolved Hospital Problems   No resolved problems to display.       Plan:  RX for constipation. Check lab. Probably SNU tomorrow    Omid Pyle MD  3/7/2019  11:37 AM

## 2019-03-07 NOTE — THERAPY TREATMENT NOTE
Acute Care - Physical Therapy Treatment Note  [facility]     Patient Name: Samantha Davila  : 1940  MRN: [id number]  Today's Date: 3/7/2019             Admit Date: 3/2/2019    Visit Dx:    ICD-10-CM ICD-9-CM   1. Closed fracture of neck of right femur, initial encounter (CMS/[facility]) S72.001A 820.8     Patient Active Problem List   Diagnosis   • Neurodermatitis   • Insomnia   • Viral upper respiratory tract infection   • Closed fracture of neck of right femur (CMS/[facility])   • Fall       Therapy Treatment    Rehabilitation Treatment Summary     Row Name 19 1423 19 1121          Treatment Time/Intention    Discipline  physical therapist  -MA  physical therapist  -MA     Document Type  therapy note (daily note)  -MA  therapy note (daily note)  -MA     Subjective Information  complains of;pain  -MA  complains of;weakness;pain  -MA     Mode of Treatment  physical therapy;co-treatment  -MA  physical therapy;individual therapy  -MA     Patient/Family Observations  reclined in chair, no acute distress  -MA  reclined in chair, no acute distress  -MA     Patient Effort  good  -MA  good  -MA     Existing Precautions/Restrictions  fall  -MA  fall  -MA     Recorded by [MA] Kendy Choudhury, PT 19 142 [MA] Kendy Choudhury, PT 19 112     Row Name 19 1423 19 1121          Cognitive Assessment/Intervention- PT/OT    Orientation Status (Cognition)  oriented x 4  -MA  oriented x 4  -MA     Follows Commands (Cognition)  WNL  -MA  follows one step commands  -MA     Personal Safety Interventions  fall prevention program maintained;gait belt;muscle strengthening facilitated;nonskid shoes/slippers when out of bed;supervised activity  -MA  fall prevention program maintained;gait belt;muscle strengthening facilitated;nonskid shoes/slippers when out of bed;supervised activity  -MA     Recorded by [MA] Kendy Choudhury, PT 19 142 [MA] Kendy Choudhury, PT 19 112     Row Name 19 1423 03/07/19 1121           Bed Mobility Assessment/Treatment    Sit-Supine [place] (Bed Mobility)  minimum assist (75% patient effort)  -MA  --     Bed Mobility, Safety Issues  decreased use of legs for bridging/pushing  -MA  --     Assistive Device (Bed Mobility)  bed rails;head of bed elevated  -MA  --     Comment (Bed Mobility)  assist with LEs  -MA  NT- UIC  -MA     Recorded by [MA] Kendy Choudhury, PT 03/07/19 1425 [MA] Kendy Choudhury, PT 03/07/19 1124     Row Name 03/07/19 1423 03/07/19 1121          Sit-Stand Transfer    Sit-Stand [place] (Transfers)  minimum assist (75% patient effort)  -MA  moderate assist (50% patient effort)  -MA     Assistive Device (Sit-Stand Transfers)  walker, front-wheeled  -MA  walker, front-wheeled  -MA     Recorded by [MA] Kendy Choudhury, PT 03/07/19 1425 [MA] Kendy Choudhury, PT 03/07/19 1124     Row Name 03/07/19 1423 03/07/19 1121          Stand-Sit Transfer    Stand-Sit [place] (Transfers)  minimum assist (75% patient effort)  -MA  minimum assist (75% patient effort)  -MA     Assistive Device (Stand-Sit Transfers)  walker, front-wheeled  -MA  walker, front-wheeled  -MA     Recorded by [MA] Kendy Choudhury, PT 03/07/19 1425 [MA] Kendy Choudhury, PT 03/07/19 1124     Row Name 03/07/19 1423 03/07/19 1121          Gait/Stairs Assessment/Training    [place] Level (Gait)  minimum assist (75% patient effort)  -MA  minimum assist (75% patient effort)  -MA     Assistive Device (Gait)  walker, front-wheeled  -MA  walker, front-wheeled  -MA     Distance in Feet (Gait)  15  -MA  10  -MA     Pattern (Gait)  step-to  -MA  step-to  -MA     Deviations/Abnormal Patterns (Gait)  khoa decreased;stride length decreased  -MA  khoa decreased;stride length decreased  -MA     Bilateral Gait Deviations  forward flexed posture  -MA  forward flexed posture  -MA     Right Sided Gait Deviations  weight shift ability decreased  -MA  weight shift ability decreased  -MA     Comment (Gait/Stairs)  cues for sequencing   -MA  very slow khoa, constant cues when to move walker and which foot to step with   -MA     Recorded by [MA] Kendy Choudhury, PT 03/07/19 1425 [MA] Kendy Choudhury, PT 03/07/19 1124     Row Name 03/07/19 1423 03/07/19 1121          Therapeutic Exercise    Comment (Therapeutic Exercise)  R BABAR protocol x10   -MA  R BABAR protocol x10   -MA     Recorded by [MA] Kendy Choudhury, PT 03/07/19 1425 [MA] Kendy Choudhury, PT 03/07/19 1124     Row Name 03/07/19 1423 03/07/19 1121          Positioning and Restraints    Pre-Treatment Position  sitting in chair/recliner  -MA  sitting in chair/recliner  -MA     Post Treatment Position  bed  -MA  chair  -MA     In Bed  supine;call light within reach;encouraged to call for assist;with family/caregiver no alarm on when entering room   -MA  --     In Chair  --  reclined;call light within reach;encouraged to call for assist no alarm on when entering room   -MA     Recorded by [MA] Kendy Choudhury, PT 03/07/19 1425 [MA] Kendy Choudhury, PT 03/07/19 1124     Row Name 03/07/19 1423 03/07/19 1121          Pain Scale: Numbers Pre/Post-Treatment    Pain Scale: Numbers, Pretreatment  4/10  -MA  5/10  -MA     Pain Scale: Numbers, Post-Treatment  5/10  -MA  6/10  -MA     Pain Location - Side  Right  -MA  Right  -MA     Pain Location  hip  -MA  hip  -MA     Pain Intervention(s)  Cold applied;Repositioned;Ambulation/increased activity  -MA  Cold applied;Repositioned;Ambulation/increased activity  -MA     Recorded by [MA] Kendy Choudhury, PT 03/07/19 1425 [MA] Kendy Choudhury, PT 03/07/19 1124     Row Name                Wound 03/04/19 1708 Right hip incision    Wound - Properties Group Date first assessed: 03/04/19 [VB] Time first assessed: 1708 [VB] Side: Right [VB] Location: hip [VB] Type: incision [VB] Recorded by:  [VB] Sherrie Brock RN 03/04/19 1708      User Key  (r) = Recorded By, (t) = Taken By, (c) = Cosigned By    Initials Name Effective Dates Discipline    Sherrie Prasad, RN 06/16/16 -  Nurse    MA  Kendy Choudhury, PT 10/19/18 -  PT          Wound 03/04/19 1708 Right hip incision (Active)   Dressing Appearance dry;intact;no drainage 3/7/2019  8:18 AM   Closure MARY 3/7/2019  8:18 AM   Base dressing in place, unable to visualize 3/7/2019  8:18 AM   Drainage Amount none 3/7/2019 12:00 PM           Physical Therapy Education     Title: PT OT SLP Therapies (Done)     Topic: Physical Therapy (Done)     Point: Mobility training (Done)     Learning Progress Summary           Patient Acceptance, E, VU,NR by MA at 3/7/2019  2:25 PM    Acceptance, E, VU,NR by MA at 3/7/2019 11:24 AM    Acceptance, E,TB,D, VU,NR by CG at 3/6/2019  4:40 PM    Acceptance, E,D, VU,NR by TYLER at 3/6/2019 12:19 PM    Acceptance, E,D, VU,NR by TYLER at 3/5/2019  1:44 PM                   Point: Home exercise program (Done)     Learning Progress Summary           Patient Acceptance, E, VU,NR by MA at 3/7/2019  2:25 PM    Acceptance, E, VU,NR by MA at 3/7/2019 11:24 AM    Acceptance, E,TB,D, VU,NR by CG at 3/6/2019  4:40 PM    Acceptance, E,D, VU,NR by TYLER at 3/6/2019 12:19 PM    Acceptance, E,D, VU,NR by TYLER at 3/5/2019  1:44 PM                   Point: Body mechanics (Done)     Learning Progress Summary           Patient Acceptance, E, VU,NR by MA at 3/7/2019  2:25 PM    Acceptance, E, VU,NR by MA at 3/7/2019 11:24 AM    Acceptance, E,TB,D, VU,NR by CG at 3/6/2019  4:40 PM    Acceptance, E,D, VU,NR by TYLER at 3/6/2019 12:19 PM    Acceptance, E,D, VU,NR by TYLER at 3/5/2019  1:44 PM                   Point: Precautions (Done)     Learning Progress Summary           Patient Acceptance, E, VU,NR by MA at 3/7/2019  2:25 PM    Acceptance, E, VU,NR by MA at 3/7/2019 11:24 AM    Acceptance, E,TB,D, VU,NR by CG at 3/6/2019  4:40 PM    Acceptance, DA DAVID VUNR by TYLER at 3/6/2019 12:19 PM    Acceptance, DA DAVID VU, NR by TYLER at 3/5/2019  1:44 PM                               User Key     Initials Effective Dates Name Provider Type Discipline    TYLER 02/05/19 -  Morgan,  Nupur Hall, PT Physical Therapist PT    MA 10/19/18 -  Kendy Choudhury, PT Physical Therapist PT     02/04/19 -  Dandre Reddy, PT Student PT Student PT                PT Recommendation and Plan     Outcome Summary: Pt requires 1 assist for mobility. Very slow khoa and requires cues for safety and sequencing throughout. Will continue to progress as tolerated.   Outcome Measures     Row Name 03/07/19 1300 03/06/19 1600 03/06/19 1200       How much help from another person do you currently need...    Turning from your back to your side while in flat bed without using bedrails?  2  -MA  2  -CH (r) CG (t) CH (c)  2  -KH    Moving from lying on back to sitting on the side of a flat bed without bedrails?  2  -MA  2  -CH (r) CG (t) CH (c)  2  -KH    Moving to and from a bed to a chair (including a wheelchair)?  3  -MA  3  -CH (r) CG (t) CH (c)  3  -KH    Standing up from a chair using your arms (e.g., wheelchair, bedside chair)?  3  -MA  2  -CH (r) CG (t) CH (c)  2  -KH    Climbing 3-5 steps with a railing?  1  -MA  1  -CH (r) CG (t) CH (c)  1  -KH    To walk in hospital room?  3  -MA  3  -CH (r) CG (t) CH (c)  3  -KH    AM-PAC 6 Clicks Score  14  -MA  13  -CH (r) CG (t)  13  -KH       Functional Assessment    Outcome Measure Options  AM-PAC 6 Clicks Basic Mobility (PT)  -MA  AM-PAC 6 Clicks Basic Mobility (PT)  -CH (r) CG (t) CH (c)  AM-PAC 6 Clicks Basic Mobility (PT)  -KH    Row Name 03/05/19 1346             How much help from another person do you currently need...    Turning from your back to your side while in flat bed without using bedrails?  2  -KH      Moving from lying on back to sitting on the side of a flat bed without bedrails?  2  -KH      Moving to and from a bed to a chair (including a wheelchair)?  1  -KH      Standing up from a chair using your arms (e.g., wheelchair, bedside chair)?  1  -KH      Climbing 3-5 steps with a railing?  1  -KH      To walk in hospital room?  1  -KH      AM-PAC 6  Clicks Score  8  -         Functional Assessment    Outcome Measure Options  AM-PAC 6 Clicks Basic Mobility (PT)  -        User Key  (r) = Recorded By, (t) = Taken By, (c) = Cosigned By    Initials Name Provider Type    Nupur Delvalle, PT Physical Therapist    Simran Burroughs, PT Physical Therapist    Kendy Mcdonald, PT Physical Therapist    Dandre Layne, PT Student PT Student         Time Calculation:   PT Charges     Row Name 03/07/19 1425 03/07/19 1302 03/07/19 1121       Time Calculation    Start Time  1356  -MA  1054  -MA  1054  -MA    Stop Time  1422  -MA  1118  -MA  1118  -MA    Time Calculation (min)  26 min  -MA  24 min  -MA  24 min  -MA    PT Received On  03/07/19  -MA  --  03/07/19  -MA    PT - Next Appointment  03/08/19  -MA  --  03/07/19  -MA       Time Calculation- PT    Total Timed Code Minutes- PT  26 minute(s)  -MA  --  24 minute(s)  -MA      User Key  (r) = Recorded By, (t) = Taken By, (c) = Cosigned By    Initials Name Provider Type    Kendy Mcdonald, PT Physical Therapist        Therapy Suggested Charges     Code   Minutes Charges    None           Therapy Charges for Today     Code Description Service Date Service Provider Modifiers Qty    51119672280 HC PT THER PROC EA 15 MIN 3/7/2019 Kendy Choudhury, PT GP 2    71952892164 HC PT THER PROC EA 15 MIN 3/7/2019 Kendy Choudhury, PT GP 2          PT G-Codes  Outcome Measure Options: AM-PAC 6 Clicks Basic Mobility (PT)  AM-PAC 6 Clicks Score: 14    Kendy Choudhury, PT  3/7/2019

## 2019-03-07 NOTE — PLAN OF CARE
Problem: Patient Care Overview  Goal: Plan of Care Review  Outcome: Ongoing (interventions implemented as appropriate)   03/07/19 0218   Coping/Psychosocial   Plan of Care Reviewed With patient   Plan of Care Review   Progress improving   OTHER   Outcome Summary Pt is a post op day 3 of a rt total hip, dressing is clean dry and intact. Pt continues with the CRUZ dressing. Pt was up in the chair for part of the shift. Pt ambulates to the bed side commode very slowly.Voiding function intact. Pt is very nervous about being discharged to rehab. Pt  called a few times this shift. He is also concerned that his wife is not ready for discharge. Try to reassure pt that we will make sure she is stable enough to tranfer to rehag efffectively. HIstory of confusion, but no confusion noted this shift. Pt continues with PO pain meds. Pt has a bed ready at [facility]. Pt is resting at this time will continue to monitor.     Goal: Individualization and Mutuality  Outcome: Ongoing (interventions implemented as appropriate)    Goal: Discharge Needs Assessment  Outcome: Ongoing (interventions implemented as appropriate)    Goal: Interprofessional Rounds/Family Conf  Outcome: Ongoing (interventions implemented as appropriate)      Problem: Skin Injury Risk (Adult)  Goal: Skin Health and Integrity  Outcome: Ongoing (interventions implemented as appropriate)      Problem: Fracture Orthopaedic (Adult)  Goal: Signs and Symptoms of Listed Potential Problems Will be Absent, Minimized or Managed (Fracture Orthopaedic)  Outcome: Ongoing (interventions implemented as appropriate)

## 2019-03-08 VITALS
SYSTOLIC BLOOD PRESSURE: 97 MMHG | WEIGHT: 114 LBS | HEART RATE: 82 BPM | HEIGHT: 62 IN | RESPIRATION RATE: 16 BRPM | BODY MASS INDEX: 20.98 KG/M2 | DIASTOLIC BLOOD PRESSURE: 54 MMHG | TEMPERATURE: 97 F | OXYGEN SATURATION: 98 %

## 2019-03-08 LAB
ANION GAP SERPL CALCULATED.3IONS-SCNC: 9.8 MMOL/L
BASOPHILS # BLD AUTO: 0.03 10*3/MM3 (ref 0–0.2)
BASOPHILS NFR BLD AUTO: 0.4 % (ref 0–1.5)
BUN BLD-MCNC: 9 MG/DL (ref 8–23)
BUN/CREAT SERPL: 20.5 (ref 7–25)
CALCIUM SPEC-SCNC: 8.6 MG/DL (ref 8.6–10.5)
CHLORIDE SERPL-SCNC: 96 MMOL/L (ref 98–107)
CO2 SERPL-SCNC: 28.2 MMOL/L (ref 22–29)
CREAT BLD-MCNC: 0.44 MG/DL (ref 0.57–1)
DEPRECATED RDW RBC AUTO: 45.2 FL (ref 37–54)
EOSINOPHIL # BLD AUTO: 0.3 10*3/MM3 (ref 0–0.4)
EOSINOPHIL NFR BLD AUTO: 4.1 % (ref 0.3–6.2)
ERYTHROCYTE [DISTWIDTH] IN BLOOD BY AUTOMATED COUNT: 12.9 % (ref 12.3–15.4)
GFR SERPL CREATININE-BSD FRML MDRD: 138 ML/MIN/1.73
GLUCOSE BLD-MCNC: 106 MG/DL (ref 65–99)
HCT VFR BLD AUTO: 29 % (ref 34–46.6)
HGB BLD-MCNC: 9.4 G/DL (ref 12–15.9)
IMM GRANULOCYTES # BLD AUTO: 0.04 10*3/MM3 (ref 0–0.05)
IMM GRANULOCYTES NFR BLD AUTO: 0.5 % (ref 0–0.5)
LYMPHOCYTES # BLD AUTO: 1.75 10*3/MM3 (ref 0.7–3.1)
LYMPHOCYTES NFR BLD AUTO: 24 % (ref 19.6–45.3)
MCH RBC QN AUTO: 31 PG (ref 26.6–33)
MCHC RBC AUTO-ENTMCNC: 32.4 G/DL (ref 31.5–35.7)
MCV RBC AUTO: 95.7 FL (ref 79–97)
MONOCYTES # BLD AUTO: 0.92 10*3/MM3 (ref 0.1–0.9)
MONOCYTES NFR BLD AUTO: 12.6 % (ref 5–12)
NEUTROPHILS # BLD AUTO: 4.24 10*3/MM3 (ref 1.4–7)
NEUTROPHILS NFR BLD AUTO: 58.4 % (ref 42.7–76)
NRBC BLD AUTO-RTO: 0 /100 WBC (ref 0–0)
PLATELET # BLD AUTO: 312 10*3/MM3 (ref 140–450)
PMV BLD AUTO: 9.5 FL (ref 6–12)
POTASSIUM BLD-SCNC: 3.6 MMOL/L (ref 3.5–5.2)
RBC # BLD AUTO: 3.03 10*6/MM3 (ref 3.77–5.28)
SODIUM BLD-SCNC: 134 MMOL/L (ref 136–145)
WBC NRBC COR # BLD: 7.28 10*3/MM3 (ref 3.4–10.8)

## 2019-03-08 PROCEDURE — 85025 COMPLETE CBC W/AUTO DIFF WBC: CPT | Performed by: HOSPITALIST

## 2019-03-08 PROCEDURE — 80048 BASIC METABOLIC PNL TOTAL CA: CPT | Performed by: HOSPITALIST

## 2019-03-08 RX ORDER — POLYETHYLENE GLYCOL 3350 17 G/17G
17 POWDER, FOR SOLUTION ORAL DAILY
Start: 2019-03-09 | End: 2021-09-29

## 2019-03-08 RX ORDER — HYDROCODONE BITARTRATE AND ACETAMINOPHEN 5; 325 MG/1; MG/1
1 TABLET ORAL EVERY 6 HOURS PRN
Qty: 20 TABLET | Refills: 0 | Status: SHIPPED | OUTPATIENT
Start: 2019-03-08 | End: 2019-03-14

## 2019-03-08 RX ORDER — ZOLPIDEM TARTRATE 5 MG/1
5 TABLET ORAL NIGHTLY PRN
Qty: 10 TABLET | Refills: 0 | Status: SHIPPED | OUTPATIENT
Start: 2019-03-08 | End: 2019-06-04 | Stop reason: SDUPTHER

## 2019-03-08 RX ORDER — BISACODYL 10 MG
10 SUPPOSITORY, RECTAL RECTAL DAILY PRN
Start: 2019-03-08 | End: 2020-02-14

## 2019-03-08 RX ORDER — ACETAMINOPHEN 325 MG/1
650 TABLET ORAL EVERY 4 HOURS PRN
Start: 2019-03-08 | End: 2020-02-14

## 2019-03-08 RX ORDER — SENNA AND DOCUSATE SODIUM 50; 8.6 MG/1; MG/1
2 TABLET, FILM COATED ORAL 2 TIMES DAILY PRN
Start: 2019-03-08 | End: 2020-02-14

## 2019-03-08 RX ADMIN — SODIUM CHLORIDE, PRESERVATIVE FREE 3 ML: 5 INJECTION INTRAVENOUS at 08:07

## 2019-03-08 RX ADMIN — HYDROCODONE BITARTRATE AND ACETAMINOPHEN 1 TABLET: 5; 325 TABLET ORAL at 08:06

## 2019-03-08 RX ADMIN — Medication 1 CAPSULE: at 08:07

## 2019-03-08 RX ADMIN — APIXABAN 2.5 MG: 2.5 TABLET, FILM COATED ORAL at 08:06

## 2019-03-08 RX ADMIN — BUDESONIDE 6 MG: 3 CAPSULE, GELATIN COATED ORAL at 08:06

## 2019-03-08 RX ADMIN — POLYETHYLENE GLYCOL 3350 17 G: 17 POWDER, FOR SOLUTION ORAL at 08:07

## 2019-03-08 RX ADMIN — HYDROCODONE BITARTRATE AND ACETAMINOPHEN 1 TABLET: 5; 325 TABLET ORAL at 02:39

## 2019-03-08 NOTE — PROGRESS NOTES
[facility]    Physicians Statement of Medical Necessity for Ambulance Transportation    It is medically necessary for:    Patient Name: Samantha Davila    Insurance Information:      To be transported by ambulance:    From (if nursing facility, specify level of care: skilled, senior living, etc): [facility]     To (specify level of care if nursing facility): [facility]    Date of Service: 3/8/19    For dialysis patients state date dialysis began: N/A    Diagnosis:  Right Total Hip Arthroplasty        Past Medical/Surgical History:  Past Medical History:   Diagnosis Date   • Asthma    • Insomnia       Past Surgical History:   Procedure Laterality Date   • BACK SURGERY     • ROTATOR CUFF REPAIR Left    • TONSILLECTOMY     • TOTAL HIP ARTHROPLASTY Right 3/4/2019    Procedure: TOTAL HIP ARTHROPLASTY ANTERIOR WITH HANA TABLE;  Surgeon: Michael Gillespie II, MD;  Location: [facility];  Service: Orthopedics        Current Objective Medical Evidence(including physical exam finding to support reason for limitations):    Immobilization syndrome    Other: Right Total Hip Arthroplasty        Physician Signature:           (RN,NP,PA,CAN, Discharge Planner) Date/Time: 3/8/19 @1123     Printed Name:    ___KAI MEEK RN/CCP______________________________    [facility]   Phone: [phone] Phone: [phone]   Fax: [phone] Fax: [phone]

## 2019-03-08 NOTE — PROGRESS NOTES
Continued Stay Note  [facility]     Patient Name: Samantha Davila  MRN: [id number]  Today's Date: 3/8/2019    Admit Date: 3/2/2019    Discharge Plan     Row Name 03/08/19 0838       Plan    Plan Comments  Patient up to [facility], CCP to follow-up with patient.  RN updated.      Row Name 03/08/19 0832       Plan    Plan Comments  Per Adilene with [facility], rooms are getting tight and patient anticipated today.  Awaiting BM, if no planned d/c patient may loose the bed at [facility].  Alla mccauley/ CAREN to notify Dr Pyle.                     Tigist Roca, RN

## 2019-03-08 NOTE — DISCHARGE SUMMARY
PHYSICIAN DISCHARGE SUMMARY                                                                        [facility]    Patient Identification:  Name: Samantha Davila  Age: 79 y.o.  Sex: female  :  1940  MRN: [id number]  Primary Care Physician: Bobby Jimenez MD    Admit date: 3/2/2019  Discharge date and time:3/8/2019  Discharged Condition: good    Discharge Diagnoses:  Active Hospital Problems    Diagnosis  POA   • **Closed fracture of neck of right femur (CMS/[facility]) [S72.001A]  Yes   • Fall [W19.XXXA]  Yes   • Insomnia [G47.00]  Yes      Resolved Hospital Problems   No resolved problems to display.          PMHX:   Past Medical History:   Diagnosis Date   • Asthma    • Insomnia      PSHX:   Past Surgical History:   Procedure Laterality Date   • BACK SURGERY     • ROTATOR CUFF REPAIR Left    • TONSILLECTOMY     • TOTAL HIP ARTHROPLASTY Right 3/4/2019    Procedure: TOTAL HIP ARTHROPLASTY ANTERIOR WITH HANA TABLE;  Surgeon: Michael Gillespie II, MD;  Location: [facility];  Service: Orthopedics       Hospital Course: Samantha Davila  is a 79 y.o. female with a history of insomnia who presents to [facility] with fall off of the second bottom step going down to her basement.  She reports she does not really recall the events leading up to the fall but she did fall land on the floor and hit her head.  She then could not stand up and had severe right hip pain.  She, slid sideways and her  try to come down to help her and was still unable to get up.  She reports no chest pain or palpitations.  No dyspnea or cough.  Pain is located in her right hip and is nonradiating.  Severe in quality worse with movement and improved with medication.  She also reports some right knee and ankle pain but those are mild compared to her hip.             The patient was admitted to the hospital and seen by orthopedic surgery.  She  had right total hip replacement and postoperatively was still pretty weak.  The plan is to go to a skilled nursing facility for some rehab until she is strong enough to get back home.  She will follow-up with her primary care after released from rehab and also follow-up with orthopedic surgery.    Consults:     Consults     Date and Time Order Name Status Description    3/3/2019 0041 Inpatient Orthopedic Surgery Consult Completed     3/2/2019 2255 Ortho (on-call MD unless specified) Completed     3/2/2019 2255 LHA (on-call MD unless specified) Completed         Results from last 7 days   Lab Units 03/08/19  0348   WBC 10*3/mm3 7.28   HEMOGLOBIN g/dL 9.4*   HEMATOCRIT % 29.0*   PLATELETS 10*3/mm3 312     Results from last 7 days   Lab Units 03/08/19  0348   SODIUM mmol/L 134*   POTASSIUM mmol/L 3.6   CHLORIDE mmol/L 96*   CO2 mmol/L 28.2   BUN mg/dL 9   CREATININE mg/dL 0.44*   GLUCOSE mg/dL 106*   CALCIUM mg/dL 8.6     Significant Diagnostic Studies:   Lab Results   Component Value Date    WBC 7.28 03/08/2019    HGB 9.4 (L) 03/08/2019    HCT 29.0 (L) 03/08/2019     03/08/2019     Lab Results   Component Value Date     (L) 03/08/2019    K 3.6 03/08/2019    CL 96 (L) 03/08/2019    CO2 28.2 03/08/2019    BUN 9 03/08/2019    CREATININE 0.44 (L) 03/08/2019    GLUCOSE 106 (H) 03/08/2019     Lab Results   Component Value Date    CALCIUM 8.6 03/08/2019     No results found for: AST, ALT, ALKPHOS  No results found for: APTT, INR  No results found for: COLORU, CLARITYU, SPECGRAV, PHUR, PROTEINUR, GLUCOSEU, KETONESU, BLOODU, NITRITE, LEUKOCYTESUR, BILIRUBINUR, UROBILINOGEN, RBCUA, WBCUA, BACTERIA, UACOMMENT  No results found for: TROPONINT, TROPONINI, BNP  No components found for: HGBA1C;2  No components found for: TSH;2  Imaging Results (all)     Procedure Component Value Units Date/Time    XR Hip With or Without Pelvis 1 View Right [[id number]] Collected:  03/04/19 1854     Updated:  03/04/19 1956    Narrative:        TWO-VIEW PORTABLE RIGHT HIP IN OR     HISTORY: Right hip replacement for fracture.     Imaging was performed in the operating room at the time of right hip  replacement and the alignment appears satisfactory. Two images were  obtained and the fluoroscopy time measures 4 seconds.     This report was finalized on 3/4/2019 7:53 PM by Dr. Huy Lee M.D.       XR Pelvis 1 or 2 View [[id number]] Collected:  03/04/19 1853     Updated:  03/04/19 1956    Narrative:       ONE VIEW PORTABLE PELVIS     HISTORY: Right hip replacement for fracture.     The patient has had recent total hip replacement on the right and the  alignment appears satisfactory.     This report was finalized on 3/4/2019 7:52 PM by Dr. Huy Lee M.D.       FL C Arm During Surgery [[id number]] Resulted:  03/04/19 1717     Updated:  03/04/19 1717    Narrative:       This procedure was auto-finalized with no dictation required.    XR Knee 1 or 2 View Left [[id number]] Collected:  03/04/19 1021     Updated:  03/04/19 1028    Narrative:       LEFT KNEE X-RAYS     CLINICAL HISTORY: Patient fell. Left knee pain.     Compared to previous left knee x-rays dated 4/1/2012.     There is extensive osteoarthritis involving the patellofemoral and  medial quadrants of the knee joint that appears slightly more prominent  than on the previous study. There is moderate narrowing of the medial  compartment of knee joint and marked narrowing of the patellofemoral  compartment with prominent marginal bony spurring. The lateral  compartment is only slightly narrowed. A small knee joint effusion is  evident. There is no evidence of fracture or subluxation.     This report was finalized on 3/4/2019 10:25 AM by Dr. Gonzalez Sarah M.D.       XR Chest 1 View [[id number]] Collected:  03/02/19 4322     Updated:  03/02/19 4698    Narrative:       PORTABLE CHEST RADIOGRAPH     HISTORY: Preoperative examination. Patient fell today.     COMPARISON: February 13, 2006      FINDINGS:  Cardiomegaly is identified. There is no evidence of vascular congestion.  There is tortuosity and ectasia of the thoracic aorta. No pneumothorax  or pleural effusion is seen. There is some mild bibasilar scarring.       Impression:       Cardiomegaly. No acute infiltrates.     This report was finalized on 3/2/2019 11:00 PM by Dr. Padmini Rosenbaum M.D.       XR Hip With or Without Pelvis 2 - 3 View Right [[id number]] Collected:  03/02/19 2257     Updated:  03/02/19 2302    Narrative:       3 VIEWS RIGHT HIP     HISTORY: Right hip pain after a fall     COMPARISON: None available.     FINDINGS:  This patient has a right femoral neck fracture. It does appear to be  comminuted. Proximal femur appears displaced superiorly. No fracture is  seen on the left. No additional fractures are seen.       Impression:       Right femoral neck fracture.     This report was finalized on 3/2/2019 10:59 PM by Dr. Padmini Rosenbaum M.D.       CT Head Without Contrast [[id number]] Collected:  03/02/19 2227     Updated:  03/02/19 2235    Narrative:       CT OF THE HEAD WITHOUT CONTRAST     HISTORY: Fall with head trauma     COMPARISON: April 11, 2012     TECHNIQUE: Axial CT imaging was obtained from the vertex of the skull to  the skull base. No IV contrast was administered.     FINDINGS:  There is diffuse cerebral atrophy, with compensatory ventricular  dilatation, in keeping with the age of 79. No acute intracranial  hemorrhage is seen. There is periventricular deep white matter  microangiopathic change. There is no midline shift or mass effect. The  visualized paranasal sinuses and mastoid air cells appear clear, with  exception of mucous retention cyst within the left sphenoid sinus. No  calvarial fracture is seen. There is a right parieto-occipital soft  tissue hematoma/laceration. Again, no underlying calvarial fracture is  seen.       Impression:       No acute intracranial hemorrhage, although the patient does have a  right  parieto-occipital soft tissue hematoma/laceration     Radiation dose reduction techniques were utilized, including automated  exposure control and exposure modulation based on body size.     This report was finalized on 3/2/2019 10:31 PM by Dr. Padmini Rosenbaum M.D.           Lab Results (last 7 days)     Procedure Component Value Units Date/Time    Basic Metabolic Panel [[id number]]  (Abnormal) Collected:  03/08/19 0348    Specimen:  Blood Updated:  03/08/19 0440     Glucose 106 mg/dL      BUN 9 mg/dL      Creatinine 0.44 mg/dL      Sodium 134 mmol/L      Potassium 3.6 mmol/L      Chloride 96 mmol/L      CO2 28.2 mmol/L      Calcium 8.6 mg/dL      eGFR Non African Amer 138 mL/min/1.73      BUN/Creatinine Ratio 20.5     Anion Gap 9.8 mmol/L     Narrative:       The MDRD GFR formula is only valid for adults with stable renal function between ages 18 and 70.    CBC & Differential [[id number]] Collected:  03/08/19 0348    Specimen:  Blood Updated:  03/08/19 0423    Narrative:       The following orders were created for panel order CBC & Differential.  Procedure                               Abnormality         Status                     ---------                               -----------         ------                     CBC Auto Differential[[id number]]        Abnormal            Final result                 Please view results for these tests on the individual orders.    CBC Auto Differential [[id number]]  (Abnormal) Collected:  03/08/19 0348    Specimen:  Blood Updated:  03/08/19 0423     WBC 7.28 10*3/mm3      RBC 3.03 10*6/mm3      Hemoglobin 9.4 g/dL      Hematocrit 29.0 %      MCV 95.7 fL      MCH 31.0 pg      MCHC 32.4 g/dL      RDW 12.9 %      RDW-SD 45.2 fl      MPV 9.5 fL      Platelets 312 10*3/mm3      Neutrophil % 58.4 %      Lymphocyte % 24.0 %      Monocyte % 12.6 %      Eosinophil % 4.1 %      Basophil % 0.4 %      Immature Grans % 0.5 %      Neutrophils, Absolute 4.24 10*3/mm3      Lymphocytes,  Absolute 1.75 10*3/mm3      Monocytes, Absolute 0.92 10*3/mm3      Eosinophils, Absolute 0.30 10*3/mm3      Basophils, Absolute 0.03 10*3/mm3      Immature Grans, Absolute 0.04 10*3/mm3      nRBC 0.0 /100 WBC     Basic Metabolic Panel [[id number]]  (Abnormal) Collected:  03/05/19 0727    Specimen:  Blood Updated:  03/05/19 0817     Glucose 122 mg/dL      BUN 13 mg/dL      Creatinine 0.53 mg/dL      Sodium 136 mmol/L      Potassium 3.9 mmol/L      Chloride 99 mmol/L      CO2 28.3 mmol/L      Calcium 8.1 mg/dL      eGFR Non African Amer 111 mL/min/1.73      BUN/Creatinine Ratio 24.5     Anion Gap 8.7 mmol/L     Narrative:       The MDRD GFR formula is only valid for adults with stable renal function between ages 18 and 70.    CBC (No Diff) [[id number]]  (Abnormal) Collected:  03/05/19 0727    Specimen:  Blood Updated:  03/05/19 0758     WBC 12.44 10*3/mm3      RBC 3.66 10*6/mm3      Hemoglobin 11.3 g/dL      Hematocrit 34.9 %      MCV 95.4 fL      MCH 30.9 pg      MCHC 32.4 g/dL      RDW 13.1 %      RDW-SD 45.8 fl      MPV 9.9 fL      Platelets 251 10*3/mm3     Comprehensive Metabolic Panel [[id number]]  (Abnormal) Collected:  03/03/19 0516    Specimen:  Blood Updated:  03/03/19 0634     Glucose 119 mg/dL      BUN 12 mg/dL      Creatinine 0.45 mg/dL      Sodium 142 mmol/L      Potassium 3.6 mmol/L      Chloride 102 mmol/L      CO2 24.3 mmol/L      Calcium 8.3 mg/dL      Total Protein 6.0 g/dL      Albumin 3.80 g/dL      ALT (SGPT) 32 U/L      AST (SGOT) 53 U/L      Alkaline Phosphatase 63 U/L      Total Bilirubin 0.4 mg/dL      eGFR Non African Amer 134 mL/min/1.73      Globulin 2.2 gm/dL      A/G Ratio 1.7 g/dL      BUN/Creatinine Ratio 26.7     Anion Gap 15.7 mmol/L     Narrative:       The MDRD GFR formula is only valid for adults with stable renal function between ages 18 and 70.    CBC Auto Differential [[id number]]  (Abnormal) Collected:  03/03/19 0516    Specimen:  Blood Updated:  03/03/19 0606     WBC 10.98  10*3/mm3      RBC 3.94 10*6/mm3      Hemoglobin 12.2 g/dL      Hematocrit 37.8 %      MCV 95.9 fL      MCH 31.0 pg      MCHC 32.3 g/dL      RDW 13.1 %      RDW-SD 46.5 fl      MPV 9.7 fL      Platelets 281 10*3/mm3      Neutrophil % 82.1 %      Lymphocyte % 10.8 %      Monocyte % 6.4 %      Eosinophil % 0.0 %      Basophil % 0.3 %      Immature Grans % 0.4 %      Neutrophils, Absolute 9.02 10*3/mm3      Lymphocytes, Absolute 1.19 10*3/mm3      Monocytes, Absolute 0.70 10*3/mm3      Eosinophils, Absolute 0.00 10*3/mm3      Basophils, Absolute 0.03 10*3/mm3      Immature Grans, Absolute 0.04 10*3/mm3      nRBC 0.0 /100 WBC     Comprehensive Metabolic Panel [[id number]]  (Abnormal) Collected:  03/02/19 2213    Specimen:  Blood Updated:  03/02/19 2248     Glucose 108 mg/dL      BUN 13 mg/dL      Creatinine 0.59 mg/dL      Sodium 140 mmol/L      Potassium 3.7 mmol/L      Chloride 100 mmol/L      CO2 26.5 mmol/L      Calcium 8.9 mg/dL      Total Protein 6.9 g/dL      Albumin 4.40 g/dL      ALT (SGPT) 31 U/L      AST (SGOT) 50 U/L      Alkaline Phosphatase 67 U/L      Total Bilirubin 0.2 mg/dL      eGFR Non African Amer 98 mL/min/1.73      Globulin 2.5 gm/dL      A/G Ratio 1.8 g/dL      BUN/Creatinine Ratio 22.0     Anion Gap 13.5 mmol/L     Narrative:       The MDRD GFR formula is only valid for adults with stable renal function between ages 18 and 70.    Protime-INR [[id number]]  (Normal) Collected:  03/02/19 2213    Specimen:  Blood Updated:  03/02/19 2242     Protime 13.4 Seconds      INR 1.04    aPTT [[id number]]  (Normal) Collected:  03/02/19 2213    Specimen:  Blood Updated:  03/02/19 2242     PTT 23.9 seconds     CBC & Differential [[id number]] Collected:  03/02/19 2213    Specimen:  Blood Updated:  03/02/19 2234    Narrative:       The following orders were created for panel order CBC & Differential.  Procedure                               Abnormality         Status                     ---------                        "        -----------         ------                     CBC Auto Differential[[id number]]        Abnormal            Final result                 Please view results for these tests on the individual orders.    CBC Auto Differential [[id number]]  (Abnormal) Collected:  03/02/19 2213    Specimen:  Blood Updated:  03/02/19 2234     WBC 14.82 10*3/mm3      RBC 4.49 10*6/mm3      Hemoglobin 13.7 g/dL      Hematocrit 42.7 %      MCV 95.1 fL      MCH 30.5 pg      MCHC 32.1 g/dL      RDW 13.2 %      RDW-SD 46.5 fl      MPV 9.6 fL      Platelets 326 10*3/mm3      Neutrophil % 85.0 %      Lymphocyte % 8.0 %      Monocyte % 5.8 %      Eosinophil % 0.2 %      Basophil % 0.3 %      Immature Grans % 0.7 %      Neutrophils, Absolute 12.60 10*3/mm3      Lymphocytes, Absolute 1.19 10*3/mm3      Monocytes, Absolute 0.86 10*3/mm3      Eosinophils, Absolute 0.03 10*3/mm3      Basophils, Absolute 0.04 10*3/mm3      Immature Grans, Absolute 0.10 10*3/mm3      nRBC 0.0 /100 WBC         /59 (BP Location: Left arm, Patient Position: Lying)   Pulse 86   Temp 98.4 °F (36.9 °C) (Oral)   Resp 16   Ht 157.5 cm (62\")   Wt 51.7 kg (114 lb)   SpO2 97%   BMI 20.85 kg/m²     Discharge Exam:  General Appearance:    Alert, cooperative, no distress                          Head:    Normocephalic, without obvious abnormality, atraumatic                          Eyes:                            Throat:   Lips, tongue, gums normal                          Neck:   Supple, symmetrical, trachea midline, no JVD                        Lungs:     Clear to auscultation bilaterally, respirations unlabored                Chest Wall:    No tenderness or deformity                        Heart:    Regular rate and rhythm, S1 and S2 normal, no murmur,no  Rub or gallop                  Abdomen:     Soft, non-tender, bowel sounds active, no masses, no organomegaly                  Extremities:   Extremities normal, right hip with surgical change, no cyanosis " or edema                             Skin:   Skin is warm and dry,  no rashes or palpable lesions                  Neurologic:   no focal deficits noted     Disposition:  Skilled nursing facility    Patient Instructions:      Discharge Medications      New Medications      Instructions Start Date   acetaminophen 325 MG tablet  Commonly known as:  TYLENOL   650 mg, Oral, Every 4 Hours PRN      apixaban 2.5 MG tablet tablet  Commonly known as:  ELIQUIS   2.5 mg, Oral, Every 12 Hours Scheduled      bisacodyl 10 MG suppository  Commonly known as:  DULCOLAX   10 mg, Rectal, Daily PRN      HYDROcodone-acetaminophen 5-325 MG per tablet  Commonly known as:  NORCO   1 tablet, Oral, Every 6 Hours PRN      polyethylene glycol packet  Commonly known as:  MIRALAX   17 g, Oral, Daily      sennosides-docusate sodium 8.6-50 MG tablet  Commonly known as:  SENOKOT-S   2 tablets, Oral, 2 Times Daily PRN         Changes to Medications      Instructions Start Date   zolpidem 5 MG tablet  Commonly known as:  AMBIEN  What changed:    · medication strength  · how much to take  · reasons to take this  · additional instructions   5 mg, Oral, Nightly PRN         Continue These Medications      Instructions Start Date   Budesonide 3 MG 24 hr capsule  Commonly known as:  ENTOCORT EC   TAKE 3 CS PO QD      cetirizine-pseudoephedrine 5-120 MG per 12 hr tablet  Commonly known as:  ALL DAY ALLERGY-D   1 tablet, Oral, 2 Times Daily      PROBIOTIC DAILY PO   Oral      Vitamin D3 2000 units tablet   Oral      WOMENS 50+ MULTI VITAMIN/MIN PO   Oral           Future Appointments   Date Time Provider Department Center   4/30/2019  1:15 PM Bobby Jimenez MD [id number] None      Contact information for follow-up providers     Bobby Jimenez MD Follow up.    Specialty:  Family Medicine  Why:  After released from rehab  Contact information:  [address] [place] [id number] [phone]             Angel Cook MD Follow up.     Specialty:  Gastroenterology  Contact information:  [address]  [address]  [facility] [address]  [phone]             Michael Gillespie II, MD Follow up.    Specialty:  Orthopedic Surgery  Contact information:  [address]  [address]  [facility] [id number] [phone]                   Contact information for after-discharge care     Destination     [facility] Follow up.    Service:  Skilled Nursing  Contact information:  [address]  [facility] [address] [phone]                           Discharge Order (From admission, onward)    Start     Ordered    03/08/19 1046  Discharge patient  Once     Expected Discharge Date:  03/08/19    Discharge Disposition:  Skilled Nursing Facility (DC - External)    Physician of Record for Attribution - Please select from Treatment Team:  OMID PYLE [[id number]]    Review needed by CMO to determine Physician of Record:  No       Question Answer Comment   Physician of Record for Attribution - Please select from Treatment Team OMID PYLE    Review needed by CMO to determine Physician of Record No        03/08/19 1048          Total time spent discharging patient including evaluation,post hospitalization follow up,  medication and post hospitalization instructions and education total time exceeds 30 minutes.    Signed:  Omid Pyle MD  3/8/2019  10:51 AM

## 2019-03-08 NOTE — PLAN OF CARE
Problem: Patient Care Overview  Goal: Plan of Care Review  Outcome: Ongoing (interventions implemented as appropriate)   03/08/19 0256   Coping/Psychosocial   Plan of Care Reviewed With patient   Plan of Care Review   Progress improving   OTHER   Outcome Summary Pt is a post op day 3 of a rt total hip. Dressing is clean dry and intact. Pt continues with the CRUZ dressing. Pt is ambulating much better this shift. Continues with PO pain meds this shift. Pt has not had a bowel movement since admission. Offered a suppository but pt refused. As per pt, she has a history of colitis and does not want a flare up. Pt is passing gas and has good bowel sounds. Pt did take some prune juice. As of this time, pt did not have a BM. Pt is scheduled to go to rehab in AM. Pt is resting a this time. Will continue to monitor.     Goal: Individualization and Mutuality  Outcome: Ongoing (interventions implemented as appropriate)    Goal: Discharge Needs Assessment  Outcome: Ongoing (interventions implemented as appropriate)    Goal: Interprofessional Rounds/Family Conf  Outcome: Ongoing (interventions implemented as appropriate)      Problem: Skin Injury Risk (Adult)  Goal: Skin Health and Integrity  Outcome: Ongoing (interventions implemented as appropriate)      Problem: Fracture Orthopaedic (Adult)  Goal: Signs and Symptoms of Listed Potential Problems Will be Absent, Minimized or Managed (Fracture Orthopaedic)  Outcome: Ongoing (interventions implemented as appropriate)         DISPLAY PLAN FREE TEXT DISPLAY PLAN FREE TEXT DISPLAY PLAN FREE TEXT

## 2019-03-08 NOTE — PROGRESS NOTES
Continued Stay Note  [facility]     Patient Name: Samantha Davila  MRN: [id number]  Today's Date: 3/8/2019    Admit Date: 3/2/2019    Discharge Plan     Row Name 03/08/19 0940       Plan    Plan  [facility]    Patient/Family in Agreement with Plan  yes    Plan Comments  Met w/ patient in room, reports +BM this morning.  Adilene w/ [facility] and Dr Pyle updated.  Anticip dc soon.            Expected Discharge Date and Time     Expected Discharge Date Expected Discharge Time    Mar 8, 2019             Tigist Roca RN

## 2019-03-08 NOTE — PLAN OF CARE
Problem: Patient Care Overview  Goal: Plan of Care Review  Outcome: Ongoing (interventions implemented as appropriate)   03/07/19 1951   Coping/Psychosocial   Plan of Care Reviewed With patient   Plan of Care Review   Progress improving   OTHER   Outcome Summary Patient has been stable throughout shift today. Good pain control w/ po meds as needed. Still no BM, patient refused suppository this afternoon. Bed ready once patient able for discharge.       Problem: Skin Injury Risk (Adult)  Goal: Skin Health and Integrity  Outcome: Ongoing (interventions implemented as appropriate)   03/07/19 1951   Skin Injury Risk (Adult)   Skin Health and Integrity making progress toward outcome

## 2019-03-08 NOTE — PROGRESS NOTES
Case Management Discharge Note    Final Note: skilled bed at [place]; spouse refusal to transport/ patient meets medical necessity for transport per [facility].      Destination - Selection Complete      Service Provider Request Status Selected Services Address Phone Number Fax Number    [facility] Selected Skilled Nursing [address] [facility] [address] [facility] [address] [phone]      Durable Medical Equipment      No service has been selected for the patient.      Dialysis/Infusion      No service has been selected for the patient.      Home Medical Care      No service has been selected for the patient.      Community Resources      No service has been selected for the patient.        Transportation Services  Ambulance: [facility]    Final Discharge Disposition Code: 03 - skilled nursing facility (SNF)

## 2019-03-08 NOTE — PROGRESS NOTES
ORTHOPAEDIC SURGERY DAILY PROGRESS NOTE  Patient is a 79 y.o. female who is 4 Days Post-Op s/p Procedure(s):  TOTAL HIP ARTHROPLASTY ANTERIOR WITH HANA TABLE     [id number] Samantha Davila Age:79 y.o. MRN:[id number]  Admitted: 3/2/2019  Overnight events: Much improved mobility.  No bowel movement yet.  pain: Pain well controlled with current pain regiment.   Ambulation/Activity: Much improved mobility yesterday  Vitals:  Vitals:    03/07/19 1501 03/07/19 1939 03/07/19 2311 03/08/19 0337   BP: 92/51 98/56 96/52 97/53   BP Location: Left arm Left arm Left arm Left arm   Patient Position: Lying Lying Lying Lying   Pulse: 80 88 89 86   Resp: 16 16 16 16   Temp: 98.1 °F (36.7 °C) 98.9 °F (37.2 °C) 99 °F (37.2 °C) 97.3 °F (36.3 °C)   TempSrc: Oral Oral Oral Oral   SpO2: 96% 98% 97% 97%   Weight:       Height:         Ins/Outs:  Last 24hrs    Intake/Output Summary (Last 24 hours) at 3/8/2019 0634  Last data filed at 3/8/2019 0310  Gross per 24 hour   Intake 210 ml   Output 2350 ml   Net -2140 ml     Physical Exam:  CONSTITUTIONAL: A&Ox3, No acute distress  LUNGS: Equal chest rise, no shortness of air  CARDIOVASCULAR: palpable peripheral pulses  WOUND: CRUZ Wound Vac System working in good condition, minimal drainage  EXTREMITY: Right Lower Extremity   Pulses: brisk capillary refill intact   Sensation: Sensation intact to light touch to the saphenous/sural/tibial/deep peroneal/superficial peroneal nerves, and grossly throughout the extremity.   Motor: 5/5 EHL/FHL/TA/GS motor complexes    Labs:   Lab Results   Component Value Date    HGB 9.4 (L) 03/08/2019     Lab Results   Component Value Date    WBC 7.28 03/08/2019     Lab Results   Component Value Date    GLUCOSE 106 (H) 03/08/2019    CALCIUM 8.6 03/08/2019     (L) 03/08/2019    K 3.6 03/08/2019    CO2 28.2 03/08/2019    CL 96 (L) 03/08/2019    BUN 9 03/08/2019    CREATININE 0.44 (L) 03/08/2019    EGFRIFNONA 138 03/08/2019    BCR 20.5 03/08/2019    ANIONGAP 9.8 03/08/2019          Radiology: No radiology results for the last day  Assessment: Patient is a 79 y.o. female who is 4 Days Post-Op s/p Procedure(s):  TOTAL HIP ARTHROPLASTY ANTERIOR WITH HANA TABLE   - Weight Bearing: Right Lower Extremity Weight Bearing As Tolerated  - Labs: Above lab values review. Plan: Labs look okay today  - PT/OT: To Mobilize  - DVT PPX: Eliquis 2.5mg BID  - Post-Op Xray: BABAR implants in good position. Leg lengths acceptable.   - Dispo: Further mobilization needed.  SNF when bed available and cleared by medicine.  Possibly as early as today.      Michael Gillespie II, MD  Orthopaedic Surgery  [facility]

## 2019-04-25 RX ORDER — CETIRIZINE HYDROCHLORIDE, PSEUDOEPHEDRINE HYDROCHLORIDE 5; 120 MG/1; MG/1
1 TABLET, FILM COATED, EXTENDED RELEASE ORAL 2 TIMES DAILY
Qty: 60 TABLET | Refills: 6 | Status: SHIPPED | OUTPATIENT
Start: 2019-04-25 | End: 2020-02-14 | Stop reason: SDUPTHER

## 2019-05-23 RX ORDER — ZOLPIDEM TARTRATE 10 MG/1
TABLET ORAL
Qty: 30 TABLET | Refills: 0 | OUTPATIENT
Start: 2019-05-23

## 2019-06-04 RX ORDER — ZOLPIDEM TARTRATE 5 MG/1
5 TABLET ORAL NIGHTLY PRN
Qty: 30 TABLET | Refills: 0 | Status: SHIPPED | OUTPATIENT
Start: 2019-06-04 | End: 2019-06-18 | Stop reason: SDUPTHER

## 2019-06-14 ENCOUNTER — OFFICE (OUTPATIENT)
Dept: URBAN - METROPOLITAN AREA CLINIC 65 | Facility: CLINIC | Age: 79
End: 2019-06-14
Payer: COMMERCIAL

## 2019-06-14 VITALS
DIASTOLIC BLOOD PRESSURE: 70 MMHG | WEIGHT: 117 LBS | SYSTOLIC BLOOD PRESSURE: 108 MMHG | HEIGHT: 63 IN | HEART RATE: 88 BPM

## 2019-06-14 DIAGNOSIS — K52.831 COLLAGENOUS COLITIS: ICD-10-CM

## 2019-06-14 DIAGNOSIS — R19.7 DIARRHEA, UNSPECIFIED: ICD-10-CM

## 2019-06-14 PROCEDURE — 99213 OFFICE O/P EST LOW 20 MIN: CPT | Performed by: INTERNAL MEDICINE

## 2019-06-18 ENCOUNTER — OFFICE VISIT (OUTPATIENT)
Dept: INTERNAL MEDICINE | Facility: CLINIC | Age: 79
End: 2019-06-18

## 2019-06-18 VITALS
TEMPERATURE: 97.1 F | OXYGEN SATURATION: 100 % | WEIGHT: 119 LBS | SYSTOLIC BLOOD PRESSURE: 121 MMHG | HEIGHT: 62 IN | BODY MASS INDEX: 21.9 KG/M2 | DIASTOLIC BLOOD PRESSURE: 60 MMHG | HEART RATE: 87 BPM

## 2019-06-18 DIAGNOSIS — F51.01 PRIMARY INSOMNIA: Primary | ICD-10-CM

## 2019-06-18 DIAGNOSIS — R63.4 WEIGHT LOSS: ICD-10-CM

## 2019-06-18 LAB
ALBUMIN SERPL-MCNC: 4.4 G/DL (ref 3.5–5.2)
ALBUMIN/GLOB SERPL: 2.2 G/DL
ALP SERPL-CCNC: 98 U/L (ref 39–117)
ALT SERPL-CCNC: 16 U/L (ref 1–33)
AST SERPL-CCNC: 13 U/L (ref 1–32)
BASOPHILS # BLD AUTO: 0.05 10*3/MM3 (ref 0–0.2)
BASOPHILS NFR BLD AUTO: 0.8 % (ref 0–1.5)
BILIRUB SERPL-MCNC: 0.3 MG/DL (ref 0.2–1.2)
BUN SERPL-MCNC: 17 MG/DL (ref 8–23)
BUN/CREAT SERPL: 33.3 (ref 7–25)
CALCIUM SERPL-MCNC: 9.2 MG/DL (ref 8.6–10.5)
CHLORIDE SERPL-SCNC: 103 MMOL/L (ref 98–107)
CO2 SERPL-SCNC: 30.4 MMOL/L (ref 22–29)
CREAT SERPL-MCNC: 0.51 MG/DL (ref 0.57–1)
EOSINOPHIL # BLD AUTO: 0.1 10*3/MM3 (ref 0–0.4)
EOSINOPHIL NFR BLD AUTO: 1.7 % (ref 0.3–6.2)
ERYTHROCYTE [DISTWIDTH] IN BLOOD BY AUTOMATED COUNT: 14.8 % (ref 12.3–15.4)
GLOBULIN SER CALC-MCNC: 2 GM/DL
GLUCOSE SERPL-MCNC: 81 MG/DL (ref 65–99)
HCT VFR BLD AUTO: 42.9 % (ref 34–46.6)
HGB BLD-MCNC: 13.7 G/DL (ref 12–15.9)
IMM GRANULOCYTES # BLD AUTO: 0.01 10*3/MM3 (ref 0–0.05)
IMM GRANULOCYTES NFR BLD AUTO: 0.2 % (ref 0–0.5)
LYMPHOCYTES # BLD AUTO: 2.05 10*3/MM3 (ref 0.7–3.1)
LYMPHOCYTES NFR BLD AUTO: 34.3 % (ref 19.6–45.3)
MCH RBC QN AUTO: 28.9 PG (ref 26.6–33)
MCHC RBC AUTO-ENTMCNC: 31.9 G/DL (ref 31.5–35.7)
MCV RBC AUTO: 90.5 FL (ref 79–97)
MONOCYTES # BLD AUTO: 0.52 10*3/MM3 (ref 0.1–0.9)
MONOCYTES NFR BLD AUTO: 8.7 % (ref 5–12)
NEUTROPHILS # BLD AUTO: 3.25 10*3/MM3 (ref 1.7–7)
NEUTROPHILS NFR BLD AUTO: 54.3 % (ref 42.7–76)
NRBC BLD AUTO-RTO: 0 /100 WBC (ref 0–0.2)
PLATELET # BLD AUTO: 347 10*3/MM3 (ref 140–450)
POTASSIUM SERPL-SCNC: 4.4 MMOL/L (ref 3.5–5.2)
PROT SERPL-MCNC: 6.4 G/DL (ref 6–8.5)
RBC # BLD AUTO: 4.74 10*6/MM3 (ref 3.77–5.28)
SODIUM SERPL-SCNC: 143 MMOL/L (ref 136–145)
TSH SERPL DL<=0.005 MIU/L-ACNC: 0.67 MIU/ML (ref 0.27–4.2)
WBC # BLD AUTO: 5.98 10*3/MM3 (ref 3.4–10.8)

## 2019-06-18 PROCEDURE — 99213 OFFICE O/P EST LOW 20 MIN: CPT | Performed by: FAMILY MEDICINE

## 2019-06-18 RX ORDER — ZOLPIDEM TARTRATE 5 MG/1
5 TABLET ORAL NIGHTLY PRN
Qty: 30 TABLET | Refills: 5 | Status: SHIPPED | OUTPATIENT
Start: 2019-06-18 | End: 2020-02-14

## 2019-06-18 NOTE — PROGRESS NOTES
CC:INSOMNIA,WT LOSS    Subjective.../HPI  Patient present today with1)INSOMNIA ON aMBIEN 5 MG  2) WT LOSS 6 MONS- NOT SURE WHY  I have reviewed the patient's medical history in detail and updated the computerized patient record.    Past Medical History:   Diagnosis Date   • Asthma    • Insomnia        Past Surgical History:   Procedure Laterality Date   • BACK SURGERY     • ROTATOR CUFF REPAIR Left    • TONSILLECTOMY     • TOTAL HIP ARTHROPLASTY Right 3/4/2019    Procedure: TOTAL HIP ARTHROPLASTY ANTERIOR WITH HANA TABLE;  Surgeon: Michael Gillespie II, MD;  Location: [facility] OR;  Service: Orthopedics       No family history on file.    Social History     Socioeconomic History   • Marital status:      Spouse name: Not on file   • Number of children: Not on file   • Years of education: Not on file   • Highest education level: Not on file   Tobacco Use   • Smoking status: Never Smoker   Substance and Sexual Activity   • Alcohol use: Yes   • Drug use: Defer   • Sexual activity: Defer       Most Recent Immunizations   Administered Date(s) Administered   • Flu Vaccine High Dose PF 65YR+ 11/20/2018   • Tdap 03/02/2019       Review of Systems:   Review of Systems   Constitutional: Positive for appetite change and unexpected weight change.   All other systems reviewed and are negative.        Physical Exam   Constitutional: She is oriented to person, place, and time. She appears well-developed and well-nourished.   Cardiovascular: Normal rate, regular rhythm and normal heart sounds.   Pulmonary/Chest: Effort normal and breath sounds normal.   Neurological: She is alert and oriented to person, place, and time.   Psychiatric: She has a normal mood and affect. Her behavior is normal. Thought content normal.   Vitals reviewed.        Vital Signs     Vitals:    06/18/19 0937   BP: 121/60   BP Location: Left arm   Patient Position: Sitting   Cuff Size: Small Adult   Pulse: 87   Temp: 97.1 °F (36.2 °C)   TempSrc:  "Oral   SpO2: 100%   Weight: 54 kg (119 lb)   Height: 157.5 cm (62\")          Results Review:      REVIEWED AND DISCUSSED CLINICAL RESULTS WITH PATIENT      Requested Prescriptions     Signed Prescriptions Disp Refills   • zolpidem (AMBIEN) 5 MG tablet 30 tablet 5     Sig: Take 1 tablet by mouth At Night As Needed for Sleep.         Current Outpatient Medications:   •  cetirizine-pseudoephedrine (ALL DAY ALLERGY-D) 5-120 MG per 12 hr tablet, Take 1 tablet by mouth 2 (Two) Times a Day., Disp: 60 tablet, Rfl: 6  •  Cholecalciferol (VITAMIN D3) 2000 units tablet, Take  by mouth., Disp: , Rfl:   •  Multiple Vitamins-Minerals (WOMENS 50+ MULTI VITAMIN/MIN PO), Take  by mouth., Disp: , Rfl:   •  Probiotic Product (PROBIOTIC DAILY PO), Take  by mouth., Disp: , Rfl:   •  zolpidem (AMBIEN) 5 MG tablet, Take 1 tablet by mouth At Night As Needed for Sleep., Disp: 30 tablet, Rfl: 5  •  acetaminophen (TYLENOL) 325 MG tablet, Take 2 tablets by mouth Every 4 (Four) Hours As Needed for Mild Pain ., Disp: , Rfl:   •  bisacodyl (DULCOLAX) 10 MG suppository, Insert 1 suppository into the rectum Daily As Needed for Constipation., Disp: , Rfl:   •  Budesonide (ENTOCORT EC) 3 MG 24 hr capsule, TAKE 3 CS PO QD, Disp: , Rfl: 12  •  polyethylene glycol (MIRALAX) packet, Take 17 g by mouth Daily., Disp: , Rfl:   •  sennosides-docusate sodium (SENOKOT-S) 8.6-50 MG tablet, Take 2 tablets by mouth 2 (Two) Times a Day As Needed for Constipation., Disp: , Rfl:     Procedures          Diagnoses and all orders for this visit:    Primary insomnia  -     zolpidem (AMBIEN) 5 MG tablet; Take 1 tablet by mouth At Night As Needed for Sleep.    Weight loss  -     CBC & Differential  -     Comprehensive Metabolic Panel  -     TSH        There are no Patient Instructions on file for this visit.     No Follow-up on file.    Bobby Jimenez M.D  06/18/19  10:40 AM          "

## 2019-06-25 ENCOUNTER — TELEPHONE (OUTPATIENT)
Dept: INTERNAL MEDICINE | Facility: CLINIC | Age: 79
End: 2019-06-25

## 2019-08-09 RX ORDER — ZOLPIDEM TARTRATE 10 MG/1
TABLET ORAL
Qty: 30 TABLET | Refills: 0 | Status: SHIPPED | OUTPATIENT
Start: 2019-08-09 | End: 2019-09-26 | Stop reason: SDUPTHER

## 2019-09-18 NOTE — TELEPHONE ENCOUNTER
Next OV 10/03/2019  Jason 06/18/2019  Contract 10/30/2018      Requested a jason and printed it out.

## 2019-09-26 RX ORDER — ZOLPIDEM TARTRATE 10 MG/1
TABLET ORAL
Qty: 30 TABLET | Refills: 0 | Status: SHIPPED | OUTPATIENT
Start: 2019-09-26 | End: 2019-11-09 | Stop reason: SDUPTHER

## 2019-11-14 ENCOUNTER — TELEPHONE (OUTPATIENT)
Dept: INTERNAL MEDICINE | Facility: CLINIC | Age: 79
End: 2019-11-14

## 2019-11-14 RX ORDER — ZOLPIDEM TARTRATE 10 MG/1
TABLET ORAL
Qty: 30 TABLET | Refills: 5 | Status: SHIPPED | OUTPATIENT
Start: 2019-11-14 | End: 2020-02-14 | Stop reason: SDUPTHER

## 2019-11-14 NOTE — TELEPHONE ENCOUNTER
Pt came in and signed Contract      Asking to be filled today ASAP    Pt is running a few errands today and does not want to leave Mr. Davila at home long.

## 2019-11-19 ENCOUNTER — FLU SHOT (OUTPATIENT)
Dept: INTERNAL MEDICINE | Facility: CLINIC | Age: 79
End: 2019-11-19

## 2019-11-19 DIAGNOSIS — Z23 FLU VACCINE NEED: Primary | ICD-10-CM

## 2019-11-19 PROCEDURE — G0008 ADMIN INFLUENZA VIRUS VAC: HCPCS | Performed by: FAMILY MEDICINE

## 2019-11-19 PROCEDURE — 90653 IIV ADJUVANT VACCINE IM: CPT | Performed by: FAMILY MEDICINE

## 2019-12-20 ENCOUNTER — OFFICE (OUTPATIENT)
Dept: URBAN - METROPOLITAN AREA CLINIC 65 | Facility: CLINIC | Age: 79
End: 2019-12-20
Payer: COMMERCIAL

## 2019-12-20 VITALS
HEIGHT: 63 IN | SYSTOLIC BLOOD PRESSURE: 112 MMHG | DIASTOLIC BLOOD PRESSURE: 70 MMHG | HEART RATE: 91 BPM | WEIGHT: 124 LBS

## 2019-12-20 DIAGNOSIS — R19.7 DIARRHEA, UNSPECIFIED: ICD-10-CM

## 2019-12-20 DIAGNOSIS — K52.89 OTHER SPECIFIED NONINFECTIVE GASTROENTERITIS AND COLITIS: ICD-10-CM

## 2019-12-20 PROCEDURE — 99213 OFFICE O/P EST LOW 20 MIN: CPT | Performed by: INTERNAL MEDICINE

## 2020-02-14 ENCOUNTER — OFFICE VISIT (OUTPATIENT)
Dept: FAMILY MEDICINE CLINIC | Facility: CLINIC | Age: 80
End: 2020-02-14

## 2020-02-14 VITALS
BODY MASS INDEX: 22.78 KG/M2 | RESPIRATION RATE: 16 BRPM | HEART RATE: 78 BPM | WEIGHT: 123.8 LBS | SYSTOLIC BLOOD PRESSURE: 120 MMHG | DIASTOLIC BLOOD PRESSURE: 80 MMHG | OXYGEN SATURATION: 99 % | HEIGHT: 62 IN | TEMPERATURE: 98.2 F

## 2020-02-14 DIAGNOSIS — K52.831 COLITIS, COLLAGENOUS: Primary | ICD-10-CM

## 2020-02-14 DIAGNOSIS — F51.01 PRIMARY INSOMNIA: ICD-10-CM

## 2020-02-14 DIAGNOSIS — Z12.31 SCREENING MAMMOGRAM, ENCOUNTER FOR: ICD-10-CM

## 2020-02-14 DIAGNOSIS — Z78.0 POST-MENOPAUSAL: ICD-10-CM

## 2020-02-14 PROCEDURE — 99213 OFFICE O/P EST LOW 20 MIN: CPT | Performed by: INTERNAL MEDICINE

## 2020-02-14 PROCEDURE — 90732 PPSV23 VACC 2 YRS+ SUBQ/IM: CPT | Performed by: INTERNAL MEDICINE

## 2020-02-14 PROCEDURE — G0009 ADMIN PNEUMOCOCCAL VACCINE: HCPCS | Performed by: INTERNAL MEDICINE

## 2020-02-14 RX ORDER — CETIRIZINE HYDROCHLORIDE, PSEUDOEPHEDRINE HYDROCHLORIDE 5; 120 MG/1; MG/1
1 TABLET, FILM COATED, EXTENDED RELEASE ORAL 2 TIMES DAILY
Qty: 60 TABLET | Refills: 6 | Status: SHIPPED | OUTPATIENT
Start: 2020-02-14 | End: 2020-11-10

## 2020-02-14 RX ORDER — ZOLPIDEM TARTRATE 10 MG/1
10 TABLET ORAL NIGHTLY PRN
Qty: 30 TABLET | Refills: 5 | Status: SHIPPED | OUTPATIENT
Start: 2020-02-14 | End: 2020-08-20

## 2020-02-14 RX ORDER — TERBINAFINE HYDROCHLORIDE 250 MG/1
TABLET ORAL
COMMUNITY
Start: 2020-02-05 | End: 2021-09-29

## 2020-02-14 NOTE — PROGRESS NOTES
Subjective   Samantha Davila is a 80 y.o. female. Patient is here today for establishing care as a new patient.  She was seeing Dr. Jimenez.  She generally feels okay and has had no acute symptoms.  She has known problems of a colitis, neurodermatitis apparently and some insomnia.  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  The following portions of the patient's history were reviewed and updated as appropriate: allergies, current medications, past family history, past medical history, past social history, past surgical history and problem list.    Past Medical History:   Diagnosis Date   • Arthritis    • Asthma    • Colitis, collagenous    • Colon polyp    • Insomnia       Allergies   Allergen Reactions   • Quinine Derivatives    • Sulfa Antibiotics       Social History     Socioeconomic History   • Marital status:      Spouse name: Not on file   • Number of children: Not on file   • Years of education: Not on file   • Highest education level: Not on file   Tobacco Use   • Smoking status: Never Smoker   • Smokeless tobacco: Never Used   Substance and Sexual Activity   • Alcohol use: Yes   • Drug use: Defer   • Sexual activity: Defer        Current Outpatient Medications:   •  Budesonide (ENTOCORT EC) 3 MG 24 hr capsule, TAKE 3 CS PO QD, Disp: , Rfl: 12  •  cetirizine-pseudoephedrine (ALL DAY ALLERGY-D) 5-120 MG per 12 hr tablet, Take 1 tablet by mouth 2 (Two) Times a Day., Disp: 60 tablet, Rfl: 6  •  Cholecalciferol (VITAMIN D3) 2000 units tablet, Take  by mouth., Disp: , Rfl:   •  Multiple Vitamins-Minerals (WOMENS  50+ MULTI VITAMIN/MIN PO), Take  by mouth., Disp: , Rfl:   •  polyethylene glycol (MIRALAX) packet, Take 17 g by mouth Daily., Disp: , Rfl:   •  Probiotic Product (PROBIOTIC DAILY PO), Take  by mouth., Disp: , Rfl:   •  terbinafine (lamiSIL) 250 MG tablet, TK ONE T PO D, Disp: , Rfl:   •  zolpidem (AMBIEN) 10 MG tablet, Take 1 tablet by mouth At Night As Needed for Sleep. for sleep, Disp: 30 tablet, Rfl: 5     Objective     History of Present Illness     Review of Systems   Constitutional: Negative.    HENT: Negative.    Eyes: Negative.    Respiratory: Negative.    Cardiovascular: Negative.    Gastrointestinal: Positive for abdominal pain and diarrhea.   Genitourinary: Negative.    Musculoskeletal: Negative.    Skin: Negative.    Neurological: Negative.    Psychiatric/Behavioral: Positive for sleep disturbance.       Physical Exam   Constitutional: She is oriented to person, place, and time. She appears well-developed and well-nourished.   Thin elderly female in no acute distress, blood pressure 100/70   HENT:   Head: Normocephalic and atraumatic.   Eyes: Pupils are equal, round, and reactive to light. Conjunctivae are normal. No scleral icterus.   Neck: Normal range of motion. Neck supple.   Cardiovascular: Normal rate, regular rhythm and normal heart sounds.   Pulmonary/Chest: Effort normal and breath sounds normal. No respiratory distress. She has no wheezes. She has no rales.   Musculoskeletal: Normal range of motion. She exhibits no edema.   Neurological: She is alert and oriented to person, place, and time.   Skin: Skin is warm and dry.   Psychiatric: She has a normal mood and affect. Her behavior is normal.   Nursing note and vitals reviewed.      ASSESSMENT overall the patient seems stable.  She has a history of collagenous colitis and insomnia.  I refilled her medications     Problem List Items Addressed This Visit        Digestive    Colitis, collagenous - Primary       Other    Insomnia    Relevant  Medications    zolpidem (AMBIEN) 10 MG tablet      Other Visit Diagnoses     Post-menopausal        Relevant Orders    DEXA Bone Density Axial    Screening mammogram, encounter for        Relevant Orders    Mammo Screening Bilateral With CAD          PLAN the patient received a Pneumovax 23 immunization today.  I ordered mammograms and a bone density test as she is due.  I refilled medications and would like to recheck her in about a month or thereabouts with a CBC, CMP, lipid panel, TSH and free T4 and vitamin D level    There are no Patient Instructions on file for this visit.  Return in about 1 month (around 3/14/2020) for with labs.

## 2020-03-04 DIAGNOSIS — K52.831 COLITIS, COLLAGENOUS: Primary | ICD-10-CM

## 2020-03-04 DIAGNOSIS — Z13.220 SCREENING FOR CHOLESTEROL LEVEL: ICD-10-CM

## 2020-03-04 DIAGNOSIS — R63.4 WEIGHT LOSS: ICD-10-CM

## 2020-03-06 ENCOUNTER — RESULTS ENCOUNTER (OUTPATIENT)
Dept: FAMILY MEDICINE CLINIC | Facility: CLINIC | Age: 80
End: 2020-03-06

## 2020-03-06 DIAGNOSIS — R63.4 WEIGHT LOSS: ICD-10-CM

## 2020-03-06 DIAGNOSIS — Z13.220 SCREENING FOR CHOLESTEROL LEVEL: ICD-10-CM

## 2020-03-06 DIAGNOSIS — K52.831 COLITIS, COLLAGENOUS: ICD-10-CM

## 2020-03-07 LAB
ALBUMIN SERPL-MCNC: 4.4 G/DL (ref 3.5–5.2)
ALBUMIN/GLOB SERPL: 2.1 G/DL
ALP SERPL-CCNC: 71 U/L (ref 39–117)
ALT SERPL-CCNC: 16 U/L (ref 1–33)
AST SERPL-CCNC: 16 U/L (ref 1–32)
BASOPHILS # BLD AUTO: 0.05 10*3/MM3 (ref 0–0.2)
BASOPHILS NFR BLD AUTO: 0.8 % (ref 0–1.5)
BILIRUB SERPL-MCNC: 0.4 MG/DL (ref 0.2–1.2)
BUN SERPL-MCNC: 9 MG/DL (ref 8–23)
BUN/CREAT SERPL: 14.5 (ref 7–25)
CALCIUM SERPL-MCNC: 9.2 MG/DL (ref 8.6–10.5)
CHLORIDE SERPL-SCNC: 99 MMOL/L (ref 98–107)
CHOLEST SERPL-MCNC: 227 MG/DL (ref 0–200)
CO2 SERPL-SCNC: 30.9 MMOL/L (ref 22–29)
CREAT SERPL-MCNC: 0.62 MG/DL (ref 0.57–1)
EOSINOPHIL # BLD AUTO: 0.13 10*3/MM3 (ref 0–0.4)
EOSINOPHIL NFR BLD AUTO: 2.2 % (ref 0.3–6.2)
ERYTHROCYTE [DISTWIDTH] IN BLOOD BY AUTOMATED COUNT: 12.7 % (ref 12.3–15.4)
GLOBULIN SER CALC-MCNC: 2.1 GM/DL
GLUCOSE SERPL-MCNC: 97 MG/DL (ref 65–99)
HCT VFR BLD AUTO: 39.6 % (ref 34–46.6)
HDLC SERPL-MCNC: 118 MG/DL (ref 40–60)
HGB BLD-MCNC: 13.3 G/DL (ref 12–15.9)
IMM GRANULOCYTES # BLD AUTO: 0.01 10*3/MM3 (ref 0–0.05)
IMM GRANULOCYTES NFR BLD AUTO: 0.2 % (ref 0–0.5)
LDLC SERPL CALC-MCNC: 92 MG/DL (ref 0–100)
LDLC/HDLC SERPL: 0.78 {RATIO}
LYMPHOCYTES # BLD AUTO: 1.92 10*3/MM3 (ref 0.7–3.1)
LYMPHOCYTES NFR BLD AUTO: 32 % (ref 19.6–45.3)
MCH RBC QN AUTO: 30.9 PG (ref 26.6–33)
MCHC RBC AUTO-ENTMCNC: 33.6 G/DL (ref 31.5–35.7)
MCV RBC AUTO: 91.9 FL (ref 79–97)
MONOCYTES # BLD AUTO: 0.6 10*3/MM3 (ref 0.1–0.9)
MONOCYTES NFR BLD AUTO: 10 % (ref 5–12)
NEUTROPHILS # BLD AUTO: 3.29 10*3/MM3 (ref 1.7–7)
NEUTROPHILS NFR BLD AUTO: 54.8 % (ref 42.7–76)
NRBC BLD AUTO-RTO: 0 /100 WBC (ref 0–0.2)
PLATELET # BLD AUTO: 319 10*3/MM3 (ref 140–450)
POTASSIUM SERPL-SCNC: 4.3 MMOL/L (ref 3.5–5.2)
PROT SERPL-MCNC: 6.5 G/DL (ref 6–8.5)
RBC # BLD AUTO: 4.31 10*6/MM3 (ref 3.77–5.28)
SODIUM SERPL-SCNC: 141 MMOL/L (ref 136–145)
T4 FREE SERPL-MCNC: 1.46 NG/DL (ref 0.93–1.7)
TRIGL SERPL-MCNC: 86 MG/DL (ref 0–150)
TSH SERPL DL<=0.005 MIU/L-ACNC: 0.97 UIU/ML (ref 0.27–4.2)
VLDLC SERPL CALC-MCNC: 17.2 MG/DL
WBC # BLD AUTO: 6 10*3/MM3 (ref 3.4–10.8)

## 2020-03-10 ENCOUNTER — OFFICE VISIT (OUTPATIENT)
Dept: FAMILY MEDICINE CLINIC | Facility: CLINIC | Age: 80
End: 2020-03-10

## 2020-03-10 VITALS
WEIGHT: 122.6 LBS | HEART RATE: 84 BPM | RESPIRATION RATE: 16 BRPM | BODY MASS INDEX: 22.56 KG/M2 | SYSTOLIC BLOOD PRESSURE: 120 MMHG | TEMPERATURE: 98 F | DIASTOLIC BLOOD PRESSURE: 76 MMHG | HEIGHT: 62 IN | OXYGEN SATURATION: 95 %

## 2020-03-10 DIAGNOSIS — F51.01 PRIMARY INSOMNIA: Primary | ICD-10-CM

## 2020-03-10 DIAGNOSIS — E78.5 HYPERLIPIDEMIA, UNSPECIFIED HYPERLIPIDEMIA TYPE: ICD-10-CM

## 2020-03-10 DIAGNOSIS — M85.80 OSTEOPENIA, UNSPECIFIED LOCATION: ICD-10-CM

## 2020-03-10 PROCEDURE — 99213 OFFICE O/P EST LOW 20 MIN: CPT | Performed by: INTERNAL MEDICINE

## 2020-03-10 NOTE — PROGRESS NOTES
Subjective   Samantha Davila is a 80 y.o. female. Patient is here today for follow-up on laboratory studies she generally has been doing okay and has had no acute symptoms.  She does have osteopenia.  Chief Complaint   Patient presents with   • ELEVATED CHOLESTEROL     PT HERE FOR FOLLOW UP ON LABS          Vitals:    03/10/20 1403   BP: 120/76   Pulse: 84   Resp: 16   Temp: 98 °F (36.7 °C)   SpO2: 95%     Body mass index is 22.42 kg/m².  The following portions of the patient's history were reviewed and updated as appropriate: allergies, current medications, past family history, past medical history, past social history, past surgical history and problem list.    Past Medical History:   Diagnosis Date   • Arthritis    • Asthma    • Colitis, collagenous    • Colon polyp    • Insomnia       Allergies   Allergen Reactions   • Quinine Derivatives    • Sulfa Antibiotics       Social History     Socioeconomic History   • Marital status:      Spouse name: Not on file   • Number of children: Not on file   • Years of education: Not on file   • Highest education level: Not on file   Tobacco Use   • Smoking status: Never Smoker   • Smokeless tobacco: Never Used   Substance and Sexual Activity   • Alcohol use: Yes   • Drug use: Defer   • Sexual activity: Defer        Current Outpatient Medications:   •  Budesonide (ENTOCORT EC) 3 MG 24 hr capsule, TAKE 3 CS PO QD, Disp: , Rfl: 12  •  cetirizine-pseudoephedrine (ALL DAY ALLERGY-D) 5-120 MG per 12 hr tablet, Take 1 tablet by mouth 2 (Two) Times a Day., Disp: 60 tablet, Rfl: 6  •  Cholecalciferol (VITAMIN D3) 2000 units tablet, Take  by mouth., Disp: , Rfl:   •  Multiple Vitamins-Minerals (WOMENS 50+ MULTI VITAMIN/MIN PO), Take  by mouth., Disp: , Rfl:   •  polyethylene glycol (MIRALAX) packet, Take 17 g by mouth Daily., Disp: , Rfl:   •  Probiotic Product (PROBIOTIC DAILY PO), Take  by mouth., Disp: , Rfl:   •  terbinafine (lamiSIL) 250 MG tablet, TK ONE T PO D, Disp: , Rfl:    •  zolpidem (AMBIEN) 10 MG tablet, Take 1 tablet by mouth At Night As Needed for Sleep. for sleep, Disp: 30 tablet, Rfl: 5     Objective     History of Present Illness     Review of Systems   Constitutional: Negative.    HENT: Negative.    Respiratory: Negative.    Cardiovascular: Negative.    Gastrointestinal: Negative.    Genitourinary: Negative.    Musculoskeletal: Negative.    Skin: Negative.    Neurological: Negative.    Psychiatric/Behavioral: Negative.        Physical Exam   Constitutional: She is oriented to person, place, and time. She appears well-developed and well-nourished.   Pleasant, cooperative no acute distress, blood pressure 110/80   HENT:   Head: Normocephalic and atraumatic.   Eyes: Pupils are equal, round, and reactive to light. Conjunctivae are normal. No scleral icterus.   Neck: Normal range of motion. Neck supple.   Cardiovascular: Normal rate, regular rhythm and normal heart sounds.   Pulmonary/Chest: Effort normal and breath sounds normal. No respiratory distress. She has no wheezes. She has no rales.   Musculoskeletal: Normal range of motion. She exhibits no edema.   Neurological: She is alert and oriented to person, place, and time.   Skin: Skin is warm and dry.   Psychiatric: She has a normal mood and affect. Her behavior is normal.   Nursing note and vitals reviewed.      ASSESSMENT mammograms and bone density are pending.  CBC was normal.  CMP, TSH and free T4 were essentially normal.  Lipid panel is total cholesterol of 227,  and LDL 92  #1-history of osteopenia, bone density pending  #2-hyperlipidemia, diet controlled  #3-insomnia, controlled on zolpidem     Problem List Items Addressed This Visit        Musculoskeletal and Integument    Osteopenia       Other    Insomnia - Primary          PLAN I plan on rechecking the patient in 6 months with a CMP, lipid panel and vitamin D level because of the osteopenia    There are no Patient Instructions on file for this  visit.  Return in about 6 months (around 9/10/2020) for with labs.

## 2020-03-11 ENCOUNTER — TRANSCRIBE ORDERS (OUTPATIENT)
Dept: ADMINISTRATIVE | Facility: HOSPITAL | Age: 80
End: 2020-03-11

## 2020-03-11 DIAGNOSIS — Z12.31 OTHER SCREENING MAMMOGRAM: Primary | ICD-10-CM

## 2020-04-29 ENCOUNTER — APPOINTMENT (OUTPATIENT)
Dept: BONE DENSITY | Facility: HOSPITAL | Age: 80
End: 2020-04-29

## 2020-04-29 ENCOUNTER — APPOINTMENT (OUTPATIENT)
Dept: MAMMOGRAPHY | Facility: HOSPITAL | Age: 80
End: 2020-04-29

## 2020-08-20 DIAGNOSIS — F51.01 PRIMARY INSOMNIA: ICD-10-CM

## 2020-08-20 RX ORDER — ZOLPIDEM TARTRATE 10 MG/1
TABLET ORAL
Qty: 30 TABLET | Refills: 0 | Status: SHIPPED | OUTPATIENT
Start: 2020-08-20 | End: 2020-10-01

## 2020-09-17 ENCOUNTER — OFFICE VISIT (OUTPATIENT)
Dept: FAMILY MEDICINE CLINIC | Facility: CLINIC | Age: 80
End: 2020-09-17

## 2020-09-17 VITALS
HEIGHT: 62 IN | TEMPERATURE: 98.7 F | BODY MASS INDEX: 22.63 KG/M2 | HEART RATE: 91 BPM | RESPIRATION RATE: 16 BRPM | DIASTOLIC BLOOD PRESSURE: 70 MMHG | SYSTOLIC BLOOD PRESSURE: 102 MMHG | OXYGEN SATURATION: 97 % | WEIGHT: 123 LBS

## 2020-09-17 DIAGNOSIS — M85.80 OSTEOPENIA, UNSPECIFIED LOCATION: ICD-10-CM

## 2020-09-17 DIAGNOSIS — Z12.31 VISIT FOR SCREENING MAMMOGRAM: ICD-10-CM

## 2020-09-17 DIAGNOSIS — E78.5 HYPERLIPIDEMIA, UNSPECIFIED HYPERLIPIDEMIA TYPE: Primary | ICD-10-CM

## 2020-09-17 DIAGNOSIS — F51.01 PRIMARY INSOMNIA: ICD-10-CM

## 2020-09-17 DIAGNOSIS — Z78.0 POST-MENOPAUSAL: ICD-10-CM

## 2020-09-17 PROCEDURE — 90694 VACC AIIV4 NO PRSRV 0.5ML IM: CPT | Performed by: INTERNAL MEDICINE

## 2020-09-17 PROCEDURE — 99213 OFFICE O/P EST LOW 20 MIN: CPT | Performed by: INTERNAL MEDICINE

## 2020-09-17 PROCEDURE — G0008 ADMIN INFLUENZA VIRUS VAC: HCPCS | Performed by: INTERNAL MEDICINE

## 2020-09-17 NOTE — PROGRESS NOTES
Subjective   Samantha Davila is a 80 y.o. female. Patient is here today for follow-up on hyperlipidemia, history of osteopenia and insomnia.  She is generally been stable.  Her insomnia is controlled on medication.  She did not get previously ordered mammogram or bone density test and has no recent laboratory studies to review.  She has had no acute symptoms or changes however  Chief Complaint   Patient presents with   • Insomnia          Vitals:    09/17/20 1411   BP: 102/70   Pulse: 91   Resp: 16   Temp: 98.7 °F (37.1 °C)   SpO2: 97%     Body mass index is 22.49 kg/m².  The following portions of the patient's history were reviewed and updated as appropriate: allergies, current medications, past family history, past medical history, past social history, past surgical history and problem list.    Past Medical History:   Diagnosis Date   • Arthritis    • Asthma    • Colitis, collagenous    • Colon polyp    • Insomnia       Allergies   Allergen Reactions   • Quinine Derivatives    • Sulfa Antibiotics       Social History     Socioeconomic History   • Marital status:      Spouse name: Not on file   • Number of children: Not on file   • Years of education: Not on file   • Highest education level: Not on file   Tobacco Use   • Smoking status: Never Smoker   • Smokeless tobacco: Never Used   Substance and Sexual Activity   • Alcohol use: Yes   • Drug use: Defer   • Sexual activity: Defer        Current Outpatient Medications:   •  Budesonide (ENTOCORT EC) 3 MG 24 hr capsule, TAKE 3 CS PO QD, Disp: , Rfl: 12  •  cetirizine-pseudoephedrine (ALL DAY ALLERGY-D) 5-120 MG per 12 hr tablet, Take 1 tablet by mouth 2 (Two) Times a Day., Disp: 60 tablet, Rfl: 6  •  Cholecalciferol (VITAMIN D3) 2000 units tablet, Take  by mouth., Disp: , Rfl:   •  Multiple Vitamins-Minerals (WOMENS 50+ MULTI VITAMIN/MIN PO), Take  by mouth., Disp: , Rfl:   •  Probiotic Product (PROBIOTIC DAILY PO), Take  by mouth., Disp: , Rfl:   •  zolpidem  (AMBIEN) 10 MG tablet, TAKE 1 TABLET BY MOUTH AT NIGHT AS NEEDED FOR SLEEP, Disp: 30 tablet, Rfl: 0  •  polyethylene glycol (MIRALAX) packet, Take 17 g by mouth Daily., Disp: , Rfl:   •  terbinafine (lamiSIL) 250 MG tablet, TK ONE T PO D, Disp: , Rfl:      Objective     History of Present Illness     Review of Systems   Constitutional: Negative.    HENT: Negative.    Respiratory: Negative.    Cardiovascular: Negative.    Gastrointestinal: Negative.    Genitourinary: Negative.    Musculoskeletal: Negative.    Skin: Negative.    Neurological: Negative.    Psychiatric/Behavioral: Negative.        Physical Exam  Vitals signs and nursing note reviewed.   Constitutional:       Appearance: Normal appearance.      Comments: Thin   HENT:      Head: Normocephalic and atraumatic.   Eyes:      General: No scleral icterus.     Conjunctiva/sclera: Conjunctivae normal.   Neck:      Musculoskeletal: Normal range of motion.   Cardiovascular:      Rate and Rhythm: Normal rate and regular rhythm.      Heart sounds: Normal heart sounds.   Pulmonary:      Effort: Pulmonary effort is normal. No respiratory distress.      Breath sounds: Normal breath sounds. No wheezing or rales.   Musculoskeletal: Normal range of motion.   Skin:     General: Skin is warm and dry.   Neurological:      General: No focal deficit present.      Mental Status: She is alert and oriented to person, place, and time.   Psychiatric:         Mood and Affect: Mood normal.         Behavior: Behavior normal.         ASSESSMENT #1-history of hyperlipidemia, asymptomatic  #2-history of osteopenia, due for bone density test  #3-insomnia, controlled on zolpidem 5 mg     Problem List Items Addressed This Visit        Cardiovascular and Mediastinum    Hyperlipidemia - Primary       Musculoskeletal and Integument    Osteopenia       Other    Insomnia      Other Visit Diagnoses     Post-menopausal        Relevant Orders    DEXA Bone Density Axial    Visit for screening  mammogram        Relevant Orders    Mammo Screening Bilateral With CAD          PLAN the patient received a flu shot today.  I refilled her sleeping pill.  I have ordered mammograms and a bone density test to be rescheduled and I plan on rechecking her in 6 months with a CBC, CMP, lipid panel, TSH and vitamin D level    There are no Patient Instructions on file for this visit.  Return in about 6 months (around 3/17/2021) for with labs.

## 2020-10-01 DIAGNOSIS — F51.01 PRIMARY INSOMNIA: ICD-10-CM

## 2020-10-01 RX ORDER — ZOLPIDEM TARTRATE 10 MG/1
TABLET ORAL
Qty: 30 TABLET | Refills: 2 | Status: SHIPPED | OUTPATIENT
Start: 2020-10-01 | End: 2021-02-10

## 2020-11-10 RX ORDER — CETIRIZINE HYDROCHLORIDE, PSEUDOEPHEDRINE HYDROCHLORIDE 5; 120 MG/1; MG/1
TABLET, FILM COATED, EXTENDED RELEASE ORAL
Qty: 60 TABLET | Refills: 6 | Status: SHIPPED | OUTPATIENT
Start: 2020-11-10 | End: 2021-07-27

## 2020-11-23 ENCOUNTER — HOSPITAL ENCOUNTER (OUTPATIENT)
Dept: BONE DENSITY | Facility: HOSPITAL | Age: 80
Discharge: HOME | End: 2020-11-23

## 2020-11-23 ENCOUNTER — HOSPITAL ENCOUNTER (OUTPATIENT)
Dept: MAMMOGRAPHY | Facility: HOSPITAL | Age: 80
Discharge: HOME | End: 2020-11-23

## 2020-11-23 DIAGNOSIS — Z78.0 POST-MENOPAUSAL: ICD-10-CM

## 2020-11-23 DIAGNOSIS — Z12.31 VISIT FOR SCREENING MAMMOGRAM: ICD-10-CM

## 2020-11-23 PROCEDURE — 77067 SCR MAMMO BI INCL CAD: CPT

## 2020-11-23 PROCEDURE — 77080 DXA BONE DENSITY AXIAL: CPT

## 2020-11-23 PROCEDURE — 77063 BREAST TOMOSYNTHESIS BI: CPT

## 2020-12-02 ENCOUNTER — OFFICE VISIT (OUTPATIENT)
Dept: FAMILY MEDICINE CLINIC | Facility: CLINIC | Age: 80
End: 2020-12-02

## 2020-12-02 VITALS
BODY MASS INDEX: 23.55 KG/M2 | OXYGEN SATURATION: 98 % | TEMPERATURE: 97.6 F | HEART RATE: 88 BPM | SYSTOLIC BLOOD PRESSURE: 140 MMHG | RESPIRATION RATE: 18 BRPM | HEIGHT: 62 IN | DIASTOLIC BLOOD PRESSURE: 80 MMHG | WEIGHT: 128 LBS

## 2020-12-02 DIAGNOSIS — M81.0 AGE-RELATED OSTEOPOROSIS WITHOUT CURRENT PATHOLOGICAL FRACTURE: Primary | ICD-10-CM

## 2020-12-02 PROCEDURE — 99213 OFFICE O/P EST LOW 20 MIN: CPT | Performed by: INTERNAL MEDICINE

## 2020-12-02 RX ORDER — ALENDRONATE SODIUM 70 MG/1
70 TABLET ORAL
Qty: 4 TABLET | Refills: 5 | Status: SHIPPED | OUTPATIENT
Start: 2020-12-02 | End: 2021-09-29

## 2020-12-02 NOTE — PROGRESS NOTES
Subjective   Samantha Davila is a 80 y.o. female. Patient is here today for follow-up on her recent DEXA scan the patient has had.  She is not having any orthopedic problems  Chief Complaint   Patient presents with   • Results          Vitals:    12/02/20 1424   BP: 140/80   Pulse: 88   Resp: 18   Temp: 97.6 °F (36.4 °C)   SpO2: 98%     Body mass index is 23.41 kg/m².  The following portions of the patient's history were reviewed and updated as appropriate: allergies, current medications, past family history, past medical history, past social history, past surgical history and problem list.    Past Medical History:   Diagnosis Date   • Arthritis    • Asthma    • Colitis, collagenous    • Colon polyp    • Insomnia       Allergies   Allergen Reactions   • Quinine Derivatives    • Sulfa Antibiotics       Social History     Socioeconomic History   • Marital status:      Spouse name: Not on file   • Number of children: Not on file   • Years of education: Not on file   • Highest education level: Not on file   Tobacco Use   • Smoking status: Never Smoker   • Smokeless tobacco: Never Used   Substance and Sexual Activity   • Alcohol use: Yes   • Drug use: Defer   • Sexual activity: Defer        Current Outpatient Medications:   •  alendronate (Fosamax) 70 MG tablet, Take 1 tablet by mouth Every 7 (Seven) Days., Disp: 4 tablet, Rfl: 5  •  Budesonide (ENTOCORT EC) 3 MG 24 hr capsule, TAKE 3 CS PO QD, Disp: , Rfl: 12  •  cetirizine-pseudoephedrine (ZyrTEC-D) 5-120 MG per 12 hr tablet, TAKE 1 TABLET BY MOUTH TWICE DAILY, Disp: 60 tablet, Rfl: 6  •  Cholecalciferol (VITAMIN D3) 2000 units tablet, Take  by mouth., Disp: , Rfl:   •  Multiple Vitamins-Minerals (WOMENS 50+ MULTI VITAMIN/MIN PO), Take  by mouth., Disp: , Rfl:   •  polyethylene glycol (MIRALAX) packet, Take 17 g by mouth Daily., Disp: , Rfl:   •  Probiotic Product (PROBIOTIC DAILY PO), Take  by mouth., Disp: , Rfl:   •  terbinafine (lamiSIL) 250 MG tablet, TK ONE T  PO D, Disp: , Rfl:   •  zolpidem (AMBIEN) 10 MG tablet, TAKE 1 TABLET BY MOUTH AT NIGHT AS NEEDED FOR SLEEP, Disp: 30 tablet, Rfl: 2     Objective     History of Present Illness     Review of Systems   Constitutional: Negative.    HENT: Negative.    Respiratory: Negative.    Cardiovascular: Negative.    Gastrointestinal: Negative.    Genitourinary: Negative.    Musculoskeletal: Negative.    Skin: Negative.    Neurological: Negative.    Psychiatric/Behavioral: Negative.        Physical Exam  Vitals signs and nursing note reviewed.   Constitutional:       Appearance: Normal appearance. She is normal weight.   HENT:      Head: Normocephalic and atraumatic.   Eyes:      General: No scleral icterus.     Conjunctiva/sclera: Conjunctivae normal.   Neck:      Musculoskeletal: Normal range of motion and neck supple.   Cardiovascular:      Rate and Rhythm: Normal rate and regular rhythm.      Heart sounds: Normal heart sounds.   Pulmonary:      Effort: Pulmonary effort is normal. No respiratory distress.      Breath sounds: Normal breath sounds. No wheezing or rales.   Musculoskeletal: Normal range of motion.   Skin:     General: Skin is warm and dry.   Neurological:      General: No focal deficit present.      Mental Status: She is alert and oriented to person, place, and time.   Psychiatric:         Mood and Affect: Mood normal.         Behavior: Behavior normal.         ASSESSMENT bone density test showed osteoporosis in the hip  #1-osteoporosis of the hip     Problems Addressed this Visit        Musculoskeletal and Integument    Age-related osteoporosis without current pathological fracture - Primary      Diagnoses       Codes Comments    Age-related osteoporosis without current pathological fracture    -  Primary ICD-10-CM: M81.0  ICD-9-CM: 733.01           PLAN I want the patient to take a calcium vitamin D supplement such as Citracal or Caltrate and I am also prescribing alendronate 70 mg, 1 time weekly.  She is  already scheduled for follow-up with me with laboratory studies and will keep that appointment    There are no Patient Instructions on file for this visit.  No follow-ups on file.

## 2021-02-10 DIAGNOSIS — F51.01 PRIMARY INSOMNIA: ICD-10-CM

## 2021-02-10 RX ORDER — ZOLPIDEM TARTRATE 10 MG/1
TABLET ORAL
Qty: 30 TABLET | Refills: 2 | Status: SHIPPED | OUTPATIENT
Start: 2021-02-10 | End: 2021-03-23

## 2021-03-04 DIAGNOSIS — E78.5 HYPERLIPIDEMIA, UNSPECIFIED HYPERLIPIDEMIA TYPE: ICD-10-CM

## 2021-03-04 DIAGNOSIS — M81.0 AGE-RELATED OSTEOPOROSIS WITHOUT CURRENT PATHOLOGICAL FRACTURE: ICD-10-CM

## 2021-03-15 ENCOUNTER — BULK ORDERING (OUTPATIENT)
Dept: CASE MANAGEMENT | Facility: OTHER | Age: 81
End: 2021-03-15

## 2021-03-15 DIAGNOSIS — Z23 IMMUNIZATION DUE: ICD-10-CM

## 2021-03-23 ENCOUNTER — OFFICE VISIT (OUTPATIENT)
Dept: FAMILY MEDICINE CLINIC | Facility: CLINIC | Age: 81
End: 2021-03-23

## 2021-03-23 VITALS
HEART RATE: 96 BPM | HEIGHT: 62 IN | SYSTOLIC BLOOD PRESSURE: 130 MMHG | OXYGEN SATURATION: 98 % | BODY MASS INDEX: 22.82 KG/M2 | TEMPERATURE: 97.5 F | RESPIRATION RATE: 15 BRPM | WEIGHT: 124 LBS | DIASTOLIC BLOOD PRESSURE: 68 MMHG

## 2021-03-23 DIAGNOSIS — F51.01 PRIMARY INSOMNIA: Primary | ICD-10-CM

## 2021-03-23 DIAGNOSIS — E78.5 HYPERLIPIDEMIA, UNSPECIFIED HYPERLIPIDEMIA TYPE: ICD-10-CM

## 2021-03-23 DIAGNOSIS — K52.831 COLITIS, COLLAGENOUS: ICD-10-CM

## 2021-03-23 DIAGNOSIS — M81.0 AGE-RELATED OSTEOPOROSIS WITHOUT CURRENT PATHOLOGICAL FRACTURE: ICD-10-CM

## 2021-03-23 PROCEDURE — G0009 ADMIN PNEUMOCOCCAL VACCINE: HCPCS | Performed by: INTERNAL MEDICINE

## 2021-03-23 PROCEDURE — 99213 OFFICE O/P EST LOW 20 MIN: CPT | Performed by: INTERNAL MEDICINE

## 2021-03-23 PROCEDURE — 90670 PCV13 VACCINE IM: CPT | Performed by: INTERNAL MEDICINE

## 2021-03-23 RX ORDER — ZOLPIDEM TARTRATE 5 MG/1
5 TABLET ORAL NIGHTLY PRN
Qty: 30 TABLET | Refills: 5 | Status: SHIPPED | OUTPATIENT
Start: 2021-03-23 | End: 2021-10-11

## 2021-03-23 NOTE — PROGRESS NOTES
Subjective   Samantha Davila is a 81 y.o. female. Patient is here today for follow-up on her hyperlipidemia, osteoporosis and arthritis and insomnia.  She has been stable and has no acute complaints and has had the Covid shots.  She has no acute complaints  Chief Complaint   Patient presents with   • Hyperlipidemia          Vitals:    03/23/21 1431   BP: 130/68   Pulse: 96   Resp: 15   Temp: 97.5 °F (36.4 °C)   SpO2: 98%     Body mass index is 22.67 kg/m².  The following portions of the patient's history were reviewed and updated as appropriate: allergies, current medications, past family history, past medical history, past social history, past surgical history and problem list.    Past Medical History:   Diagnosis Date   • Arthritis    • Asthma    • Colitis, collagenous    • Colon polyp    • Insomnia       Allergies   Allergen Reactions   • Quinine Derivatives    • Sulfa Antibiotics       Social History     Socioeconomic History   • Marital status:      Spouse name: Not on file   • Number of children: Not on file   • Years of education: Not on file   • Highest education level: Not on file   Tobacco Use   • Smoking status: Never Smoker   • Smokeless tobacco: Never Used   Substance and Sexual Activity   • Alcohol use: Yes   • Drug use: Defer   • Sexual activity: Defer        Current Outpatient Medications:   •  alendronate (Fosamax) 70 MG tablet, Take 1 tablet by mouth Every 7 (Seven) Days., Disp: 4 tablet, Rfl: 5  •  Budesonide (ENTOCORT EC) 3 MG 24 hr capsule, TAKE 3 CS PO QD, Disp: , Rfl: 12  •  cetirizine-pseudoephedrine (ZyrTEC-D) 5-120 MG per 12 hr tablet, TAKE 1 TABLET BY MOUTH TWICE DAILY, Disp: 60 tablet, Rfl: 6  •  Cholecalciferol (VITAMIN D3) 2000 units tablet, Take  by mouth., Disp: , Rfl:   •  Multiple Vitamins-Minerals (WOMENS 50+ MULTI VITAMIN/MIN PO), Take  by mouth., Disp: , Rfl:   •  polyethylene glycol (MIRALAX) packet, Take 17 g by mouth Daily., Disp: , Rfl:   •  Probiotic Product (PROBIOTIC  DAILY PO), Take  by mouth., Disp: , Rfl:   •  terbinafine (lamiSIL) 250 MG tablet, TK ONE T PO D, Disp: , Rfl:   •  zolpidem (AMBIEN) 5 MG tablet, Take 1 tablet by mouth At Night As Needed for Sleep., Disp: 30 tablet, Rfl: 5     Objective     History of Present Illness     Review of Systems   Constitutional: Negative.    HENT: Negative.    Respiratory: Negative.    Cardiovascular: Negative.    Gastrointestinal: Negative.    Genitourinary: Negative.    Musculoskeletal: Negative.    Skin: Negative.    Neurological: Negative.    Hematological: Negative.    Psychiatric/Behavioral: Negative.        Physical Exam  Vitals and nursing note reviewed.   Constitutional:       General: She is not in acute distress.     Appearance: Normal appearance. She is not ill-appearing.   Eyes:      General: No scleral icterus.     Conjunctiva/sclera: Conjunctivae normal.   Cardiovascular:      Rate and Rhythm: Normal rate and regular rhythm.      Heart sounds: Normal heart sounds.   Pulmonary:      Effort: Pulmonary effort is normal. No respiratory distress.      Breath sounds: Normal breath sounds. No wheezing or rales.   Musculoskeletal:         General: Normal range of motion.      Cervical back: Normal range of motion and neck supple.   Skin:     General: Skin is warm and dry.   Neurological:      General: No focal deficit present.      Mental Status: She is alert and oriented to person, place, and time.   Psychiatric:         Mood and Affect: Mood normal.         Behavior: Behavior normal.         ASSESSMENT CBC is normal.  CMP is quite normal.  Lipid panel is total cholesterol 209,  and LDL 87.  TSH and vitamin D levels are normal.  #1-hyperlipidemia, diet controlled  #2-collagenous colitis, followed by GI  #3-osteoporosis, asymptomatic  #4-insomnia, controlled on medication     Problems Addressed this Visit        Cardiac and Vasculature    Hyperlipidemia       Gastrointestinal Abdominal     Colitis, collagenous        Musculoskeletal and Injuries    Age-related osteoporosis without current pathological fracture       Sleep    Insomnia - Primary    Relevant Medications    zolpidem (AMBIEN) 5 MG tablet      Diagnoses       Codes Comments    Primary insomnia    -  Primary ICD-10-CM: F51.01  ICD-9-CM: 307.42     Age-related osteoporosis without current pathological fracture     ICD-10-CM: M81.0  ICD-9-CM: 733.01     Colitis, collagenous     ICD-10-CM: K52.831  ICD-9-CM: 558.9     Hyperlipidemia, unspecified hyperlipidemia type     ICD-10-CM: E78.5  ICD-9-CM: 272.4           PLAN the patient received a Prevnar 13 vaccination today and will continue medicines as now.  I will recheck her in 6 months with a CMP, lipid panel    There are no Patient Instructions on file for this visit.  Return in about 6 months (around 9/23/2021) for with labs.

## 2021-03-31 NOTE — OP NOTE
OPERATIVE NOTE  PATIENT NAME: Samantha Davila  MRN: [id number]  ATTENDING: Michael Gillespie II  : 1940 AGE: 79 y.o. GENDER: female  DATE OF OPERATION: 3/2/2019 - 3/4/2019  PREOPERATIVE DIAGNOSIS: Femoral Neck Fracture  POSTOPERATIVE DIAGNOSIS: Femoral Neck Fracture  OPERATION PERFORMED: right Total Hip Arthroplasty  Circulator: Sherri Contreras RN; Sherrie Brock RN  Radiology Technologist: Ryan Keane  Scrub Person: Jaime Fitzpatrick; [place]  Vendor Representative: Jani Gill; Baron Jose  Orderly: Magdiel Mendez  Assistant: Rere Oswadl PA  ANESTHESIA: General  ESTIMATED BLOOD LOSS: 300cc  SPONGE AND NEEDLE COUNT: Correct  ASSISTANT: Rere Oswald This case would not have been possible without another set of skilled surgical hands for retraction, use of instrumentation, and general assistance.  This assistance was vital to the success of the case.  INDICATIONS: Fracture: This patient was noted to have a femoral neck fracture.  Surgical options were discussed with the patient and they elected to undergo a total hip replacement. The risks of surgery were discussed and included but were not limited to the risk of anesthesia, infection, fracture, dislocation, damage to arteries/nerve/vein, need for further procedure, and medical complications. The patient voiced understanding and signed the surgical consent.  COMPONENTS:   Acetabular Cup: Smith [employer] Nephew R3 acetabular cup: 50 Outer Diameter  Cup Screws: Smith & Nephew 6.5 mm screws: No Screws Were Usedmm length  Smith & Nephew Neutral Acetabular Liner: Neutral  Smith & Nephew Polar Femoral Stem: Size 1  Smith & Nephew Oxinium Head: 32mm -3  PERTINENT FINDINGS: Fracture: Fracture of the femoral neck    DETAILS OF PROCEDURE:   The patient was met in the preoperative area. The site was marked. The consent and H&P were reviewed.The patient was then wheeled back to the operative suite underwent anesthesia. The [place] table boots were secured to  the patients’ feet using Coban for extra friction. The patient was moved onto the [place] table and secured in the supine position. The perineal post was inserted and the boots were secured into the leg holders. The operative area was marked out using two 10x15 drapes. Surgical alcohol was used to thoroughly clean the operative area.     The hip and leg was then prepped in the normal sterile fashion, which included Chloroprep, multiple layers of sterile drapes, and surgical space suits for the entire operative team.  New outer gloves were used by all sterile surgical team members after final draping. The surgical incision was marked. A surgical timeout was performed in which administration of preoperative antibiotics, tranexamic acid (if not contraindicated), and the surgical site were confirmed.    A Abarca-Sullivan anterior approach was used. Dissection was carried down to the fascia. The fascia was incised and the tensor fascia jenny muscle was retracted laterally and the sartorius medially. The lateral femoral circumflex vessels were identified and cauterized. The rectus femoris was elevated off of the capsule and retracted medially. A capsulotomy was then performed. The capsule was tagged with Ethibond for later repair. Retractors were placed on either side of the femoral neck and dissection was further carried down so that the lesser trochanter could be palpated and the superior rim of the acetabulum could be visualized.    The femoral neck cut was then made from the saddle laterally headed just proximal to the lesser trochanter medially. Care was taken not to extend the cut into the lesser trochanter medially. The head and neck segment was removed with a corkscrew. The acetabulum was then exposed. The labrum was removed using a kocher and scalpel   and excess osteophytes were removed using an osteotome.    The acetabulum was progressively reamed, beginning with medialization and then finalizing the position of the  reamer to approximate the final cup position. Fluoroscopy was used to ensure proper placement of the reamer, including adequate medialization as well as appropriate abduction and anteversion. The real cup was then opened and inserted using fluoroscopy, ensuring good position in terms of abduction and anteversion.     After thorough irrigation and ensuring that no soft tissue was entrapped within the cup, the real liner was then snapped into place.    The femur was then exposed. Soft tissue releases were performed to gain exposure to the proximal femur. Capsule was released from the saddle and the lateral femur. Care was taken to preserve the short external rotator tendons. The capsule was also released along the medial femur. The hook was placed just distal to the greater trochanter. The hip was positioned in full external rotation, extension, and adduction under the well leg. The hydraulic femoral lift was then used to better expose the femur. Further soft tissue releases were then performed, again ensuring preservation of the short external rotators.    The femur was machined with a cookie cutter osteotome and then a rasp was used to further lateralize the starting point. The sclerotic bone on the lateral shoulder of the femur was removed with a rongeour and curette as needed to protect the greater trochanter. Progressive broaches were inserted until adequate fill had been achieved. Using fluoroscopy, the femoral stem was visualized after trial reduction of the hip. The length and offset were compared to the non-operative hip. Trials of stem size and neck length were trialed until equal leg length and offset were obtained. Additionally hip stability was tested with internal and external rotation of the leg. The leg was stable with at least 90° of external rotation and there was no impingement at 60° of internal rotation. After implantation of the final stem and ball, the leg was once again brought into normal  anatomic position and relocated. Final x-rays were taken with final implants noting good position of the stem and cup, and no visualized fracture.. The hip was stable upon reduction.    An analgesic cocktail was then injected about the hip as well as the surgical dissection area. The capsule was closed with Ethibond. The fascia was closed with O Vicryl. Subcutaneous tissue was closed with 2-0 Vicryl.  A running Monocryl suture was used subcuticularly. Finally, a CRUZ wound vac was applied to the surgical site.     The patient was moved from the [place] table to the [facility] and the [facility] where the boots were removed. The patient was taken to the recovery room in stable condition. There were no complications and the patient tolerated the procedure well.      Michael Gillespie II, MD  Orthopaedic Surgery  [facility]               2020

## 2021-05-13 DIAGNOSIS — F51.01 PRIMARY INSOMNIA: ICD-10-CM

## 2021-05-13 RX ORDER — ZOLPIDEM TARTRATE 10 MG/1
TABLET ORAL
Qty: 30 TABLET | Refills: 3 | Status: SHIPPED | OUTPATIENT
Start: 2021-05-13 | End: 2021-09-29

## 2021-07-24 RX ORDER — NICOTINE POLACRILEX 2 MG
GUM BUCCAL
Qty: 60 TABLET | Refills: 6 | Status: CANCELLED | OUTPATIENT
Start: 2021-07-24

## 2021-07-27 RX ORDER — CETIRIZINE HCL/PSEUDOEPHEDRINE 5 MG-120MG
TABLET, EXTENDED RELEASE 12 HR ORAL
Qty: 60 TABLET | Refills: 6 | Status: SHIPPED | OUTPATIENT
Start: 2021-07-27 | End: 2021-10-27

## 2021-07-27 NOTE — TELEPHONE ENCOUNTER
Caller: Samantha Davila    Relationship: Self    Best call back number: [phone]    Medication needed:   Requested Prescriptions     Pending Prescriptions Disp Refills   • ZyrTEC-D Allergy & Congestion 5-120 MG per 12 hr tablet [Pharmacy Med Name: ZYRTEC-D 12 HOUR TABLETS OTC] 60 tablet 6     Sig: TAKE 1 TABLET BY MOUTH TWICE DAILY       When do you need the refill by: ASAP    What additional details did the patient provide when requesting the medication: THE PATIENT STATES THAT SHE HAS LESS THAN TWO DAYS LEFT     Does the patient have less than a 3 day supply:  [x] Yes  [] No    What is the patient's preferred pharmacy: [id number] [place], [place] - [address] AT [facility] [address] - [phone] [facility] [phone] FX

## 2021-09-02 ENCOUNTER — OFFICE VISIT (OUTPATIENT)
Dept: FAMILY MEDICINE CLINIC | Facility: CLINIC | Age: 81
End: 2021-09-02

## 2021-09-02 ENCOUNTER — HOSPITAL ENCOUNTER (OUTPATIENT)
Dept: GENERAL RADIOLOGY | Facility: HOSPITAL | Age: 81
Discharge: HOME | End: 2021-09-02
Admitting: INTERNAL MEDICINE

## 2021-09-02 VITALS
SYSTOLIC BLOOD PRESSURE: 124 MMHG | BODY MASS INDEX: 22.05 KG/M2 | HEART RATE: 85 BPM | TEMPERATURE: 97.5 F | OXYGEN SATURATION: 99 % | HEIGHT: 62 IN | DIASTOLIC BLOOD PRESSURE: 62 MMHG | WEIGHT: 119.8 LBS

## 2021-09-02 DIAGNOSIS — M54.2 NECK PAIN: Primary | ICD-10-CM

## 2021-09-02 PROCEDURE — 99214 OFFICE O/P EST MOD 30 MIN: CPT | Performed by: INTERNAL MEDICINE

## 2021-09-02 PROCEDURE — 72040 X-RAY EXAM NECK SPINE 2-3 VW: CPT

## 2021-09-02 RX ORDER — METHYLPREDNISOLONE 4 MG/1
TABLET ORAL
Qty: 21 TABLET | Refills: 0 | Status: SHIPPED | OUTPATIENT
Start: 2021-09-02 | End: 2021-09-29

## 2021-09-02 NOTE — PROGRESS NOTES
Subjective   Samantha Davila is a 81 y.o. female. Patient is here today for complaints of neck pain and some left ear pain.  Its been going on for about a month.  She says the ear hurts all the time.  Heat has been somewhat helpful but she is not taken any pain medication.  She has had no falls or trauma recently.  She does hear some crunches in her neck when she moves it and that increases the pain.  Chief Complaint   Patient presents with   • Jaw Pain     going into ear  Jaw area feels hot touch   • Headache          Vitals:    09/02/21 1010   BP: 124/62   Pulse: 85   Temp: 97.5 °F (36.4 °C)   SpO2: 99%     Body mass index is 21.9 kg/m².  The following portions of the patient's history were reviewed and updated as appropriate: allergies, current medications, past family history, past medical history, past social history, past surgical history and problem list.    Past Medical History:   Diagnosis Date   • Arthritis    • Asthma    • Colitis, collagenous    • Colon polyp    • Insomnia       Allergies   Allergen Reactions   • Quinine Derivatives    • Sulfa Antibiotics       Social History     Socioeconomic History   • Marital status:      Spouse name: Not on file   • Number of children: Not on file   • Years of education: Not on file   • Highest education level: Not on file   Tobacco Use   • Smoking status: Never Smoker   • Smokeless tobacco: Never Used   Substance and Sexual Activity   • Alcohol use: Yes   • Drug use: Defer   • Sexual activity: Defer        Current Outpatient Medications:   •  alendronate (Fosamax) 70 MG tablet, Take 1 tablet by mouth Every 7 (Seven) Days., Disp: 4 tablet, Rfl: 5  •  Budesonide (ENTOCORT EC) 3 MG 24 hr capsule, TAKE 3 CS PO QD, Disp: , Rfl: 12  •  Cetirizine-Pseudoephedrine (WAL-ZYR D PO), TAKE 1 TABLET BY MOUTH TWICE DAILY, Disp: , Rfl:   •  Cholecalciferol (VITAMIN D3) 2000 units tablet, Take  by mouth., Disp: , Rfl:   •  Loperamide HCl (IMODIUM PO), Take  by mouth., Disp: ,  Rfl:   •  Multiple Vitamins-Minerals (WOMENS 50+ MULTI VITAMIN/MIN PO), Take  by mouth., Disp: , Rfl:   •  polyethylene glycol (MIRALAX) packet, Take 17 g by mouth Daily., Disp: , Rfl:   •  Probiotic Product (PROBIOTIC DAILY PO), Take  by mouth., Disp: , Rfl:   •  terbinafine (lamiSIL) 250 MG tablet, TK ONE T PO D, Disp: , Rfl:   •  zolpidem (AMBIEN) 10 MG tablet, TAKE 1 TABLET BY MOUTH AT NIGHT AS NEEDED FOR SLEEP, Disp: 30 tablet, Rfl: 3  •  zolpidem (AMBIEN) 5 MG tablet, Take 1 tablet by mouth At Night As Needed for Sleep., Disp: 30 tablet, Rfl: 5  •  methylPREDNISolone (MEDROL) 4 MG dose pack, Take as directed on package instructions., Disp: 21 tablet, Rfl: 0  •  ZyrTEC-D Allergy & Congestion 5-120 MG per 12 hr tablet, TAKE 1 TABLET BY MOUTH TWICE DAILY, Disp: 60 tablet, Rfl: 6     Objective     History of Present Illness     Review of Systems   Constitutional: Negative.    HENT: Negative.    Respiratory: Negative.    Cardiovascular: Negative.    Gastrointestinal: Negative.    Genitourinary: Negative.    Musculoskeletal: Positive for neck pain.   Skin: Negative.    Neurological: Negative.    Hematological: Negative.    Psychiatric/Behavioral: Negative.        Physical Exam  Vitals and nursing note reviewed.   Constitutional:       General: She is not in acute distress.     Appearance: Normal appearance. She is not ill-appearing.   HENT:      Head: Normocephalic and atraumatic.      Left Ear: Tympanic membrane, ear canal and external ear normal. There is no impacted cerumen.   Eyes:      Conjunctiva/sclera: Conjunctivae normal.   Neck:      Comments: The neck muscles are tender to palpation and also some tenderness in the occipital insertion areas  Cardiovascular:      Rate and Rhythm: Normal rate and regular rhythm.      Heart sounds: Normal heart sounds.   Pulmonary:      Effort: Pulmonary effort is normal. No respiratory distress.      Breath sounds: Normal breath sounds. No wheezing or rales.    Musculoskeletal:         General: Normal range of motion.      Cervical back: Tenderness present.   Skin:     General: Skin is warm and dry.   Neurological:      General: No focal deficit present.      Mental Status: She is alert and oriented to person, place, and time.   Psychiatric:         Mood and Affect: Mood normal.         Behavior: Behavior normal.         ASSESSMENT #1-neck and head pain.  I believe this is probably related to underlying arthritis and is musculoskeletal.  The jaw does not really seem to be involved in the ear looked fine.  There is nothing to suggest temporal arteritis     Problems Addressed this Visit        Musculoskeletal and Injuries    Neck pain - Primary    Relevant Orders    XR Spine Cervical 2 or 3 View      Diagnoses       Codes Comments    Neck pain    -  Primary ICD-10-CM: M54.2  ICD-9-CM: 723.1           PLAN I am going to start the patient on a Medrol Dosepak and she certainly can continue the heat and try some Tylenol for the pain.  I have ordered cervical spine x-rays on the patient and I want to recheck her in a week    There are no Patient Instructions on file for this visit.  Return in about 1 week (around 9/9/2021) for Recheck.

## 2021-09-08 ENCOUNTER — OFFICE VISIT (OUTPATIENT)
Dept: FAMILY MEDICINE CLINIC | Facility: CLINIC | Age: 81
End: 2021-09-08

## 2021-09-08 VITALS
HEART RATE: 88 BPM | OXYGEN SATURATION: 97 % | SYSTOLIC BLOOD PRESSURE: 120 MMHG | BODY MASS INDEX: 22.78 KG/M2 | HEIGHT: 62 IN | DIASTOLIC BLOOD PRESSURE: 60 MMHG | RESPIRATION RATE: 18 BRPM | WEIGHT: 123.8 LBS | TEMPERATURE: 97.6 F

## 2021-09-08 DIAGNOSIS — M54.2 NECK PAIN: Primary | ICD-10-CM

## 2021-09-08 PROCEDURE — 99213 OFFICE O/P EST LOW 20 MIN: CPT | Performed by: INTERNAL MEDICINE

## 2021-09-08 NOTE — PROGRESS NOTES
Subjective   Samantha Davila is a 81 y.o. female. Patient is here today for follow-up on her neck pain.  She is somewhat better following the Medrol Dosepak but continues with some pain in the left occiput and with head turning.  Chief Complaint   Patient presents with   • Pain     mouth pain 1wk fu          Vitals:    09/08/21 1332   BP: 120/60   Pulse: 88   Resp: 18   Temp: 97.6 °F (36.4 °C)   SpO2: 97%     Body mass index is 22.64 kg/m².  The following portions of the patient's history were reviewed and updated as appropriate: allergies, current medications, past family history, past medical history, past social history, past surgical history and problem list.    Past Medical History:   Diagnosis Date   • Arthritis    • Asthma    • Colitis, collagenous    • Colon polyp    • Insomnia       Allergies   Allergen Reactions   • Quinine Derivatives    • Sulfa Antibiotics       Social History     Socioeconomic History   • Marital status:      Spouse name: Not on file   • Number of children: Not on file   • Years of education: Not on file   • Highest education level: Not on file   Tobacco Use   • Smoking status: Never Smoker   • Smokeless tobacco: Never Used   Substance and Sexual Activity   • Alcohol use: Yes   • Drug use: Defer   • Sexual activity: Defer        Current Outpatient Medications:   •  alendronate (Fosamax) 70 MG tablet, Take 1 tablet by mouth Every 7 (Seven) Days., Disp: 4 tablet, Rfl: 5  •  Budesonide (ENTOCORT EC) 3 MG 24 hr capsule, TAKE 3 CS PO QD, Disp: , Rfl: 12  •  Cetirizine-Pseudoephedrine (WAL-ZYR D PO), TAKE 1 TABLET BY MOUTH TWICE DAILY, Disp: , Rfl:   •  Cholecalciferol (VITAMIN D3) 2000 units tablet, Take  by mouth., Disp: , Rfl:   •  Loperamide HCl (IMODIUM PO), Take  by mouth., Disp: , Rfl:   •  methylPREDNISolone (MEDROL) 4 MG dose pack, Take as directed on package instructions., Disp: 21 tablet, Rfl: 0  •  Multiple Vitamins-Minerals (WOMENS 50+ MULTI VITAMIN/MIN PO), Take  by mouth.,  Disp: , Rfl:   •  polyethylene glycol (MIRALAX) packet, Take 17 g by mouth Daily., Disp: , Rfl:   •  Probiotic Product (PROBIOTIC DAILY PO), Take  by mouth., Disp: , Rfl:   •  terbinafine (lamiSIL) 250 MG tablet, TK ONE T PO D, Disp: , Rfl:   •  zolpidem (AMBIEN) 10 MG tablet, TAKE 1 TABLET BY MOUTH AT NIGHT AS NEEDED FOR SLEEP, Disp: 30 tablet, Rfl: 3  •  zolpidem (AMBIEN) 5 MG tablet, Take 1 tablet by mouth At Night As Needed for Sleep., Disp: 30 tablet, Rfl: 5  •  ZyrTEC-D Allergy & Congestion 5-120 MG per 12 hr tablet, TAKE 1 TABLET BY MOUTH TWICE DAILY, Disp: 60 tablet, Rfl: 6     Objective     History of Present Illness     Review of Systems   Constitutional: Negative.    HENT: Negative.    Respiratory: Negative.    Cardiovascular: Negative.    Gastrointestinal: Negative.    Genitourinary: Negative.    Musculoskeletal: Positive for neck pain.   Skin: Negative.    Neurological: Negative.    Hematological: Negative.    Psychiatric/Behavioral: Negative.        Physical Exam  Vitals and nursing note reviewed.   Constitutional:       Appearance: Normal appearance.   HENT:      Head: Normocephalic and atraumatic.   Neck:      Comments: Tenderness in the left mastoid area and occiput.  Some tenderness along strap muscles of the neck, especially left side.  Decreased range of motion due to discomfort  Cardiovascular:      Rate and Rhythm: Normal rate and regular rhythm.      Heart sounds: Normal heart sounds.   Pulmonary:      Effort: Pulmonary effort is normal. No respiratory distress.      Breath sounds: Normal breath sounds. No wheezing or rales.   Musculoskeletal:      Cervical back: Tenderness present.   Skin:     General: Skin is warm and dry.   Neurological:      General: No focal deficit present.      Mental Status: She is alert and oriented to person, place, and time.   Psychiatric:         Mood and Affect: Mood normal.         Behavior: Behavior normal.         ASSESSMENT cervical spine x-ray showed reversal  of the lordosis of the lower cervical spine, minimal anterolisthesis of C4 on C5 about 2 mm some minimal disc space narrowing at C4-5 and see 6-7, moderate at C5-6 and no fractures  #1-cervical neck pain, musculoskeletal     Problems Addressed this Visit        Musculoskeletal and Injuries    Neck pain - Primary    Relevant Orders    Ambulatory Referral to Physical Therapy Evaluate and treat      Diagnoses       Codes Comments    Neck pain    -  Primary ICD-10-CM: M54.2  ICD-9-CM: 723.1           PLAN patient will continue with Tylenol, heat and I am referring her for physical therapy.  She is already scheduled for follow-up with me later in the month with laboratory studies and will keep that appointment    There are no Patient Instructions on file for this visit.  Return in about 3 weeks (around 9/29/2021).

## 2021-09-16 DIAGNOSIS — E78.5 HYPERLIPIDEMIA, UNSPECIFIED HYPERLIPIDEMIA TYPE: Primary | ICD-10-CM

## 2021-09-29 ENCOUNTER — OFFICE VISIT (OUTPATIENT)
Dept: FAMILY MEDICINE CLINIC | Facility: CLINIC | Age: 81
End: 2021-09-29

## 2021-09-29 VITALS
WEIGHT: 122 LBS | SYSTOLIC BLOOD PRESSURE: 120 MMHG | TEMPERATURE: 97.7 F | DIASTOLIC BLOOD PRESSURE: 64 MMHG | OXYGEN SATURATION: 94 % | HEIGHT: 62 IN | BODY MASS INDEX: 22.45 KG/M2 | HEART RATE: 99 BPM

## 2021-09-29 DIAGNOSIS — M54.2 NECK PAIN: ICD-10-CM

## 2021-09-29 DIAGNOSIS — F51.01 PRIMARY INSOMNIA: ICD-10-CM

## 2021-09-29 DIAGNOSIS — E78.5 HYPERLIPIDEMIA, UNSPECIFIED HYPERLIPIDEMIA TYPE: Primary | ICD-10-CM

## 2021-09-29 DIAGNOSIS — M81.0 AGE-RELATED OSTEOPOROSIS WITHOUT CURRENT PATHOLOGICAL FRACTURE: ICD-10-CM

## 2021-09-29 PROCEDURE — 99214 OFFICE O/P EST MOD 30 MIN: CPT | Performed by: INTERNAL MEDICINE

## 2021-09-29 PROCEDURE — G0439 PPPS, SUBSEQ VISIT: HCPCS | Performed by: INTERNAL MEDICINE

## 2021-09-29 NOTE — PROGRESS NOTES
Subjective   Samantha Davila is a 81 y.o. female. Patient is here today for   Chief Complaint   Patient presents with   • Medicare Wellness-subsequent          Vitals:    09/29/21 1334   BP: 120/64   Pulse: 99   Temp: 97.7 °F (36.5 °C)   SpO2: 94%     Body mass index is 22.31 kg/m².  The following portions of the patient's history were reviewed and updated as appropriate: allergies, current medications, past family history, past medical history, past social history, past surgical history and problem list.    Past Medical History:   Diagnosis Date   • Arthritis    • Asthma    • Colitis, collagenous    • Colon polyp    • Insomnia       Allergies   Allergen Reactions   • Quinine Derivatives    • Sulfa Antibiotics       Social History     Socioeconomic History   • Marital status:      Spouse name: Not on file   • Number of children: Not on file   • Years of education: Not on file   • Highest education level: Not on file   Tobacco Use   • Smoking status: Never Smoker   • Smokeless tobacco: Never Used   Vaping Use   • Vaping Use: Never used   Substance and Sexual Activity   • Alcohol use: Yes   • Drug use: Defer   • Sexual activity: Defer        Current Outpatient Medications:   •  Budesonide (ENTOCORT EC) 3 MG 24 hr capsule, TAKE 3 CS PO QD, Disp: , Rfl: 12  •  Cetirizine-Pseudoephedrine (WAL-ZYR D PO), TAKE 1 TABLET BY MOUTH TWICE DAILY, Disp: , Rfl:   •  Cholecalciferol (VITAMIN D3) 2000 units tablet, Take  by mouth., Disp: , Rfl:   •  Loperamide HCl (IMODIUM PO), Take  by mouth., Disp: , Rfl:   •  Multiple Vitamins-Minerals (WOMENS 50+ MULTI VITAMIN/MIN PO), Take  by mouth., Disp: , Rfl:   •  Probiotic Product (PROBIOTIC DAILY PO), Take  by mouth., Disp: , Rfl:   •  zolpidem (AMBIEN) 5 MG tablet, Take 1 tablet by mouth At Night As Needed for Sleep., Disp: 30 tablet, Rfl: 5  •  ZyrTEC-D Allergy & Congestion 5-120 MG per 12 hr tablet, TAKE 1 TABLET BY MOUTH TWICE DAILY, Disp: 60 tablet, Rfl: 6     Objective      History of Present Illness     Review of Systems    Physical Exam    ASSESSMENT     Problems Addressed this Visit     None      Diagnoses    None.         PLAN    There are no Patient Instructions on file for this visit.  No follow-ups on file.   67 year old female with metastatic  breast cancer (diagnosed in 2018. 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 Also with fungating wound and extensive metastases above and below the diaphragm including lymph node metastases and hepatic metastases. 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 Furthermore with ? Brain mets) s/p chemotherapy and radiation treatment ( last session about 6 months prior to admission),  anemia, MVP, presents for shortness of breath. During her recent admission she was undecided and in fact hesitant about getting DMT and did not want to address codes status. However, she assigned her "... very close family friend (often referred to as her "sister") Anh Hui, [phone], as sole proxy, and did not identify any alternate." She is now DNR/I based on the HCP inputs.  Palliative consulted for symptom management of paid/ dyspnea and [facility]    Pulmonary is evaluating and considering thoracentesis vs. Pleurx (which I will probably recommend if considered to be appropriate by pulm). 67 year old female with metastatic  breast cancer (diagnosed in 2018. 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 Also with fungating wound and extensive metastases above and below the diaphragm including lymph node metastases and hepatic metastases. 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 Furthermore with ? Brain mets) s/p chemotherapy and radiation treatment ( last session about 6 months prior to admission),  anemia, MVP, presents for shortness of breath. During her recent admission she was undecided and in fact hesitant about getting DMT and did not want to address codes status. However, she assigned her "... very close family friend (often referred to as her "sister") Anh Meridalary, [phone], as sole proxy, and did not identify any alternate." She is now DNR/I based on the HCP inputs.  Palliative consulted for symptom management of paid/ dyspnea and GOC 67 year old female with metastatic  breast cancer (diagnosed in 2018. 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 Also with fungating wound and extensive metastases above and below the diaphragm including lymph node metastases and hepatic metastases. 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 Furthermore with ? Brain mets) s/p chemotherapy and radiation treatment ( last session about 6 months prior to admission),  anemia, MVP, presents for shortness of breath. During her recent admission she was undecided and in fact hesitant about getting DMT and did not want to address codes status. However, she assigned her "... very close family friend (often referred to as her "sister") Anh Hui, [phone], as sole proxy, and did not identify any alternate." She is now DNR/I based on the HCP inputs.  Patient transferred to PCU for management of pain and other symptoms.  Disposition planning.

## 2021-09-29 NOTE — PROGRESS NOTES
The ABCs of the Annual Wellness Visit  Subsequent Medicare Wellness Visit    Chief Complaint   Patient presents with   • Medicare Wellness-subsequent      Subjective    History of Present Illness:  Samantha Davila is a 81 y.o. female who presents for a Subsequent Medicare Wellness Visit.  She is also here for follow-up on her hyperlipidemia, neck pain, osteoporosis and insomnia.  She is generally been stable and has not yet started physical therapy.  She continues with some neck pain in the left neck that is helped by acetaminophen    The following portions of the patient's history were reviewed and   updated as appropriate: allergies, current medications, past family history, past medical history, past social history, past surgical history and problem list.    Compared to one year ago, the patient feels her physical   health is the same.    Compared to one year ago, the patient feels her mental   health is the same.    Recent Hospitalizations:  She was not admitted to the hospital during the last year.       Current Medical Providers:  Patient Care Team:  Chang Borrego MD as PCP - General (Internal Medicine)    Outpatient Medications Prior to Visit   Medication Sig Dispense Refill   • Budesonide (ENTOCORT EC) 3 MG 24 hr capsule TAKE 3 CS PO QD  12   • Cetirizine-Pseudoephedrine (WAL-ZYR D PO) TAKE 1 TABLET BY MOUTH TWICE DAILY     • Cholecalciferol (VITAMIN D3) 2000 units tablet Take  by mouth.     • Loperamide HCl (IMODIUM PO) Take  by mouth.     • Multiple Vitamins-Minerals (WOMENS 50+ MULTI VITAMIN/MIN PO) Take  by mouth.     • Probiotic Product (PROBIOTIC DAILY PO) Take  by mouth.     • zolpidem (AMBIEN) 5 MG tablet Take 1 tablet by mouth At Night As Needed for Sleep. 30 tablet 5   • ZyrTEC-D Allergy & Congestion 5-120 MG per 12 hr tablet TAKE 1 TABLET BY MOUTH TWICE DAILY 60 tablet 6   • alendronate (Fosamax) 70 MG tablet Take 1 tablet by mouth Every 7 (Seven) Days. 4 tablet 5   • methylPREDNISolone  "(MEDROL) 4 MG dose pack Take as directed on package instructions. 21 tablet 0   • polyethylene glycol (MIRALAX) packet Take 17 g by mouth Daily.     • terbinafine (lamiSIL) 250 MG tablet TK ONE T PO D     • zolpidem (AMBIEN) 10 MG tablet TAKE 1 TABLET BY MOUTH AT NIGHT AS NEEDED FOR SLEEP 30 tablet 3     No facility-administered medications prior to visit.       No opioid medication identified on active medication list. I have reviewed chart for other potential  high risk medication/s and harmful drug interactions in the elderly.          Aspirin is not on active medication list.  Aspirin use is not indicated based on review of current medical condition/s. Risk of harm outweighs potential benefits.  .    Patient Active Problem List   Diagnosis   • Neurodermatitis   • Insomnia   • Viral upper respiratory tract infection   • Closed fracture of neck of right femur (CMS/HCC)   • Fall   • Weight loss   • Colitis, collagenous   • Age-related osteoporosis without current pathological fracture   • Hyperlipidemia   • Neck pain     Advance Care Planning  Advance Directive is not on file.  ACP discussion was declined by the patient. Patient does not have an advance directive, declines further assistance.    Review of Systems   Constitutional: Negative.    HENT: Negative.    Respiratory: Negative.    Cardiovascular: Negative.    Gastrointestinal: Negative.    Genitourinary: Negative.    Musculoskeletal: Positive for neck pain.   Skin: Negative.    Neurological: Negative.    Hematological: Negative.    Psychiatric/Behavioral: Negative.         Objective    Vitals:    09/29/21 1334   BP: 120/64   Pulse: 99   Temp: 97.7 °F (36.5 °C)   SpO2: 94%   Weight: 55.3 kg (122 lb)   Height: 157.5 cm (62.01\")     BMI Readings from Last 1 Encounters:   09/29/21 22.31 kg/m²   BMI is within normal parameters. No follow-up required.    Does the patient have evidence of cognitive impairment? No    Physical Exam  Vitals and nursing note reviewed. "   Constitutional:       General: She is not in acute distress.     Appearance: Normal appearance. She is not ill-appearing.   HENT:      Head: Normocephalic and atraumatic.   Neck:      Comments: Tenderness in the left strap muscle  Cardiovascular:      Rate and Rhythm: Normal rate and regular rhythm.      Heart sounds: Normal heart sounds.   Pulmonary:      Effort: Pulmonary effort is normal. No respiratory distress.      Breath sounds: Normal breath sounds. No wheezing or rales.   Musculoskeletal:         General: Normal range of motion.      Cervical back: Tenderness present.   Skin:     General: Skin is warm and dry.   Neurological:      General: No focal deficit present.      Mental Status: She is alert and oriented to person, place, and time.   Psychiatric:         Mood and Affect: Mood normal.         Behavior: Behavior normal.       Lab Results   Component Value Date    GLU 90 09/21/2021    CHLPL 239 (H) 09/21/2021    TRIG 58 09/21/2021     09/21/2021     (H) 09/21/2021    VLDL 10 09/21/2021            HEALTH RISK ASSESSMENT    Smoking Status:  Social History     Tobacco Use   Smoking Status Never Smoker   Smokeless Tobacco Never Used     Alcohol Consumption:  Social History     Substance and Sexual Activity   Alcohol Use Yes     Fall Risk Screen:    [facility] Fall Risk Assessment was completed, and patient is at LOW risk for falls.Assessment completed on:9/29/2021    Depression Screening:  PHQ-2/PHQ-9 Depression Screening 9/29/2021   Little interest or pleasure in doing things 0   Feeling down, depressed, or hopeless 1   Trouble falling or staying asleep, or sleeping too much 1   Feeling tired or having little energy 0   Poor appetite or overeating 0   Feeling bad about yourself - or that you are a failure or have let yourself or your family down 0   Trouble concentrating on things, such as reading the newspaper or watching television 0   Moving or speaking so slowly that other people could have  noticed. Or the opposite - being so fidgety or restless that you have been moving around a lot more than usual 0   Total Score 2   If you checked off any problems, how difficult have these problems made it for you to do your work, take care of things at home, or get along with other people? Not difficult at all       Health Habits and Functional and Cognitive Screening:  Functional & Cognitive Status 9/29/2021   Do you have difficulty preparing food and eating? No   Do you have difficulty bathing yourself, getting dressed or grooming yourself? No   Do you have difficulty using the toilet? No   Do you have difficulty moving around from place to place? No   Do you have trouble with steps or getting out of a bed or a chair? No   Current Diet Well Balanced Diet   Dental Exam Not up to date   Eye Exam Not up to date   Exercise (times per week) 0 times per week   Current Exercises Include No Regular Exercise   Do you need help using the phone?  No   Are you deaf or do you have serious difficulty hearing?  No   Do you need help with transportation? No   Do you need help shopping? No   Do you need help preparing meals?  No   Do you need help with housework?  No   Do you need help with laundry? No   Do you need help taking your medications? No   Do you need help managing money? No   Do you ever drive or ride in a car without wearing a seat belt? No   Have you felt unusual stress, anger or loneliness in the last month? No   Who do you live with? Spouse   If you need help, do you have trouble finding someone available to you? No   Have you been bothered in the last four weeks by sexual problems? No   Do you have difficulty concentrating, remembering or making decisions? No       Age-appropriate Screening Schedule:  Refer to the list below for future screening recommendations based on patient's age, sex and/or medical conditions. Orders for these recommended tests are listed in the plan section. The patient has been provided  with a written plan.    Health Maintenance   Topic Date Due   • ZOSTER VACCINE (1 of 2) 09/08/2022 (Originally 1/26/1990)   • INFLUENZA VACCINE  10/01/2021   • LIPID PANEL  09/21/2022   • MAMMOGRAM  11/23/2022   • DXA SCAN  11/23/2022   • TDAP/TD VACCINES (2 - Td or Tdap) 03/02/2029              Assessment/Plan CMP was essentially normal and lipid panel is stable with a total cholesterol 239, , .  #1-hyperlipidemia, diet controlled  #2-insomnia, stable  #3-osteoporosis  #4-left-sided neck pain, awaiting physical therapy    CMS Preventative Services Quick Reference  Risk Factors Identified During Encounter  Fall Risk-High or Moderate  Immunizations Discussed/Encouraged (specific Immunizations; Influenza and COVID19  The above risks/problems have been discussed with the patient.  Follow up actions/plans if indicated are seen below in the Assessment/Plan Section.  Pertinent information has been shared with the patient in the After Visit Summary.    There are no diagnoses linked to this encounter.    Follow Up: Patient is scheduled for physical therapy for her neck and also for general strengthening.  I recommended she get the flu shot and the COVID-19 booster.  I plan on rechecking her in about 6 months with a CBC, CMP, lipid panel, TSH and free T4 and vitamin D level    No follow-ups on file.     An After Visit Summary and PPPS were made available to the patient.

## 2021-10-11 DIAGNOSIS — F51.01 PRIMARY INSOMNIA: ICD-10-CM

## 2021-10-11 NOTE — TELEPHONE ENCOUNTER
Rx Refill Note  Requested Prescriptions     Pending Prescriptions Disp Refills   • zolpidem (AMBIEN) 5 MG tablet [Pharmacy Med Name: ZOLPIDEM 5MG TABLETS] 30 tablet      Sig: TAKE 1 TABLET BY MOUTH AT NIGHT AS NEEDED FOR SLEEP      Last office visit with prescribing clinician: 9/29/2021      Next office visit with prescribing clinician: 4/13/2022            Mariela Núñez MA  10/11/21, 11:01 EDT

## 2021-10-12 RX ORDER — ZOLPIDEM TARTRATE 5 MG/1
5 TABLET ORAL NIGHTLY PRN
Qty: 30 TABLET | Refills: 5 | Status: SHIPPED | OUTPATIENT
Start: 2021-10-12 | End: 2021-11-07 | Stop reason: HOSPADM

## 2021-10-27 ENCOUNTER — OFFICE VISIT (OUTPATIENT)
Dept: FAMILY MEDICINE CLINIC | Facility: CLINIC | Age: 81
End: 2021-10-27

## 2021-10-27 VITALS
RESPIRATION RATE: 18 BRPM | HEIGHT: 62 IN | HEART RATE: 78 BPM | WEIGHT: 118.8 LBS | OXYGEN SATURATION: 96 % | DIASTOLIC BLOOD PRESSURE: 90 MMHG | SYSTOLIC BLOOD PRESSURE: 140 MMHG | TEMPERATURE: 97.7 F | BODY MASS INDEX: 21.86 KG/M2

## 2021-10-27 DIAGNOSIS — G31.84 MILD COGNITIVE IMPAIRMENT: Primary | ICD-10-CM

## 2021-10-27 PROCEDURE — 99213 OFFICE O/P EST LOW 20 MIN: CPT | Performed by: STUDENT IN AN ORGANIZED HEALTH CARE EDUCATION/TRAINING PROGRAM

## 2021-10-27 RX ORDER — MEMANTINE HYDROCHLORIDE 5 MG/1
5 TABLET ORAL DAILY
Qty: 30 TABLET | Refills: 0 | Status: SHIPPED | OUTPATIENT
Start: 2021-10-27 | End: 2022-05-05

## 2021-10-27 NOTE — PROGRESS NOTES
"Chief Complaint  Altered Mental Status    Michael Davila presents to [facility]  Neurologic Problem  The patient's primary symptoms include memory loss. The patient's pertinent negatives include no altered mental status, clumsiness, focal weakness, loss of balance, near-syncope, slurred speech, visual change or weakness. This is a chronic problem. The current episode started 1 to 4 weeks ago. The neurological problem developed insidiously. The problem has been rapidly worsening since onset. There was no focality noted. Pertinent negatives include no abdominal pain, auditory change, back pain, bladder incontinence, bowel incontinence, chest pain, dizziness, fever, headaches, nausea, shortness of breath or vomiting.   Patient herself seems not agreeable with her  regarding her problems.  Because  was complaining that patient left the door of refrigerator open and she often forgets to place utensils in the right cabinet.  While  was complaining about these things patient interrupted and told that she was doing 2 more things and she forgets to close the refrigerator door.  Then  complained yesterday night patient took Ambien which was prescribed to her along with Ambien of .  Wife does not remember any episode like this.    Objective   Vital Signs:   /90   Pulse 78   Temp 97.7 °F (36.5 °C) (Oral)   Resp 18   Ht 157.5 cm (62.01\")   Wt 53.9 kg (118 lb 12.8 oz)   SpO2 96%   BMI 21.72 kg/m²     Physical Exam  HENT:      Head: Normocephalic and atraumatic.      Mouth/Throat:      Mouth: Mucous membranes are moist.      Pharynx: Oropharynx is clear.   Eyes:      Extraocular Movements: Extraocular movements intact.      Conjunctiva/sclera: Conjunctivae normal.      Pupils: Pupils are equal, round, and reactive to light.   Cardiovascular:      Rate and Rhythm: Normal rate and regular rhythm.   Pulmonary:      Effort: Pulmonary " effort is normal.      Breath sounds: Normal breath sounds.   Abdominal:      General: Bowel sounds are normal.      Palpations: Abdomen is soft.   Musculoskeletal:         General: Normal range of motion.      Cervical back: Neck supple.   Skin:     General: Skin is warm.      Capillary Refill: Capillary refill takes less than 2 seconds.   Neurological:      General: No focal deficit present.      Mental Status: She is alert and oriented to person, place, and time. Mental status is at baseline.      Comments: Patient was able to tell her date of birth, her home address, month, time, place, person  Patient was able to tell what she ate in the morning but was not able to tell what she ate yesterday        Result Review :                 Assessment and Plan    Diagnoses and all orders for this visit:    1. Mild cognitive impairment (Primary)  -     memantine (NAMENDA) 5 MG tablet; Take 1 tablet by mouth Daily.  Dispense: 30 tablet; Refill: 0  -     CBC Auto Differential  -     Comprehensive Metabolic Panel  -     Urinalysis With Microscopic - Urine, Clean Catch  -     Ambulatory Referral to Neurology  -     MRI Brain Without Contrast; Future  -     Vitamin B12    MMSE score was 20 on examination and based on that patient is diagnosed with mild cognitive impairment.  Patient will need to follow-up with neurology and MRI brain has been ordered to rule out any cerebral pathology  Follow Up   Return in about 1 month (around 11/27/2021).  Patient was given instructions and counseling regarding her condition or for health maintenance advice. Please see specific information pulled into the AVS if appropriate.

## 2021-10-28 LAB
ALBUMIN SERPL-MCNC: 4.3 G/DL (ref 3.6–4.6)
ALBUMIN/GLOB SERPL: 2 {RATIO} (ref 1.2–2.2)
ALP SERPL-CCNC: 71 IU/L (ref 44–121)
ALT SERPL-CCNC: 15 IU/L (ref 0–32)
APPEARANCE UR: ABNORMAL
AST SERPL-CCNC: 18 IU/L (ref 0–40)
BACTERIA #/AREA URNS HPF: ABNORMAL /[HPF]
BASOPHILS # BLD AUTO: 0.1 X10E3/UL (ref 0–0.2)
BASOPHILS NFR BLD AUTO: 1 %
BILIRUB SERPL-MCNC: 0.4 MG/DL (ref 0–1.2)
BILIRUB UR QL STRIP: NEGATIVE
BUN SERPL-MCNC: 7 MG/DL (ref 8–27)
BUN/CREAT SERPL: 12 (ref 12–28)
CALCIUM SERPL-MCNC: 9.3 MG/DL (ref 8.7–10.3)
CASTS URNS QL MICRO: ABNORMAL /LPF
CHLORIDE SERPL-SCNC: 101 MMOL/L (ref 96–106)
CO2 SERPL-SCNC: 27 MMOL/L (ref 20–29)
COLOR UR: YELLOW
CREAT SERPL-MCNC: 0.6 MG/DL (ref 0.57–1)
CRYSTALS URNS MICRO: ABNORMAL
EOSINOPHIL # BLD AUTO: 0.1 X10E3/UL (ref 0–0.4)
EOSINOPHIL NFR BLD AUTO: 1 %
EPI CELLS #/AREA URNS HPF: ABNORMAL /HPF (ref 0–10)
ERYTHROCYTE [DISTWIDTH] IN BLOOD BY AUTOMATED COUNT: 12.7 % (ref 11.7–15.4)
GLOBULIN SER CALC-MCNC: 2.2 G/DL (ref 1.5–4.5)
GLUCOSE SERPL-MCNC: 99 MG/DL (ref 65–99)
GLUCOSE UR QL: NEGATIVE
HCT VFR BLD AUTO: 42.5 % (ref 34–46.6)
HGB BLD-MCNC: 14.2 G/DL (ref 11.1–15.9)
HGB UR QL STRIP: NEGATIVE
IMM GRANULOCYTES # BLD AUTO: 0 X10E3/UL (ref 0–0.1)
IMM GRANULOCYTES NFR BLD AUTO: 0 %
KETONES UR QL STRIP: ABNORMAL
LEUKOCYTE ESTERASE UR QL STRIP: ABNORMAL
LYMPHOCYTES # BLD AUTO: 2.1 X10E3/UL (ref 0.7–3.1)
LYMPHOCYTES NFR BLD AUTO: 33 %
MCH RBC QN AUTO: 30.3 PG (ref 26.6–33)
MCHC RBC AUTO-ENTMCNC: 33.4 G/DL (ref 31.5–35.7)
MCV RBC AUTO: 91 FL (ref 79–97)
MICRO URNS: ABNORMAL
MONOCYTES # BLD AUTO: 0.6 X10E3/UL (ref 0.1–0.9)
MONOCYTES NFR BLD AUTO: 9 %
MUCOUS THREADS URNS QL MICRO: PRESENT
NEUTROPHILS # BLD AUTO: 3.7 X10E3/UL (ref 1.4–7)
NEUTROPHILS NFR BLD AUTO: 56 %
NITRITE UR QL STRIP: NEGATIVE
PH UR STRIP: 7 [PH] (ref 5–7.5)
PLATELET # BLD AUTO: 353 X10E3/UL (ref 150–450)
POTASSIUM SERPL-SCNC: 4.4 MMOL/L (ref 3.5–5.2)
PROT SERPL-MCNC: 6.5 G/DL (ref 6–8.5)
PROT UR QL STRIP: NEGATIVE
RBC # BLD AUTO: 4.68 X10E6/UL (ref 3.77–5.28)
RBC #/AREA URNS HPF: ABNORMAL /HPF (ref 0–2)
SODIUM SERPL-SCNC: 140 MMOL/L (ref 134–144)
SP GR UR: 1.02 (ref 1–1.03)
UNIDENT CRYS URNS QL MICRO: PRESENT
UROBILINOGEN UR STRIP-MCNC: 0.2 MG/DL (ref 0.2–1)
VIT B12 SERPL-MCNC: 530 PG/ML (ref 232–1245)
WBC # BLD AUTO: 6.5 X10E3/UL (ref 3.4–10.8)
WBC #/AREA URNS HPF: ABNORMAL /HPF (ref 0–5)

## 2021-11-03 ENCOUNTER — APPOINTMENT (OUTPATIENT)
Dept: GENERAL RADIOLOGY | Facility: HOSPITAL | Age: 81
End: 2021-11-03

## 2021-11-03 ENCOUNTER — ANESTHESIA EVENT (OUTPATIENT)
Dept: PERIOP | Facility: HOSPITAL | Age: 81
End: 2021-11-03

## 2021-11-03 ENCOUNTER — ANESTHESIA (OUTPATIENT)
Dept: PERIOP | Facility: HOSPITAL | Age: 81
End: 2021-11-03

## 2021-11-03 ENCOUNTER — HOSPITAL ENCOUNTER (INPATIENT)
Facility: HOSPITAL | Age: 81
LOS: 4 days | Discharge: NURSING, REHAB OR LONG-TERM CARE | End: 2021-11-07
Attending: EMERGENCY MEDICINE | Admitting: INTERNAL MEDICINE
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
## 2021-11-03 DIAGNOSIS — S72.145A CLOSED NONDISPLACED INTERTROCHANTERIC FRACTURE OF LEFT FEMUR, INITIAL ENCOUNTER (HCC): Primary | ICD-10-CM

## 2021-11-03 PROBLEM — R41.0 EPISODIC CONFUSION: Status: ACTIVE | Noted: 2021-11-03

## 2021-11-03 LAB
ALBUMIN SERPL-MCNC: 4.5 G/DL (ref 3.5–5.2)
ALBUMIN/GLOB SERPL: 2 G/DL
ALP SERPL-CCNC: 67 U/L (ref 39–117)
ALT SERPL W P-5'-P-CCNC: 18 U/L (ref 1–33)
ANION GAP SERPL CALCULATED.3IONS-SCNC: 10.5 MMOL/L (ref 5–15)
ANION GAP SERPL CALCULATED.3IONS-SCNC: 8.8 MMOL/L (ref 5–15)
APTT PPP: 25.5 SECONDS (ref 22.7–35.4)
AST SERPL-CCNC: 20 U/L (ref 1–32)
BASOPHILS # BLD AUTO: 0.06 10*3/MM3 (ref 0–0.2)
BASOPHILS NFR BLD AUTO: 0.8 % (ref 0–1.5)
BILIRUB SERPL-MCNC: 0.3 MG/DL (ref 0–1.2)
BUN SERPL-MCNC: 10 MG/DL (ref 8–23)
BUN SERPL-MCNC: 9 MG/DL (ref 8–23)
BUN/CREAT SERPL: 15.3 (ref 7–25)
BUN/CREAT SERPL: 19.6 (ref 7–25)
CALCIUM SPEC-SCNC: 8.6 MG/DL (ref 8.6–10.5)
CALCIUM SPEC-SCNC: 9.1 MG/DL (ref 8.6–10.5)
CHLORIDE SERPL-SCNC: 100 MMOL/L (ref 98–107)
CHLORIDE SERPL-SCNC: 102 MMOL/L (ref 98–107)
CO2 SERPL-SCNC: 26.2 MMOL/L (ref 22–29)
CO2 SERPL-SCNC: 27.5 MMOL/L (ref 22–29)
CREAT SERPL-MCNC: 0.51 MG/DL (ref 0.57–1)
CREAT SERPL-MCNC: 0.59 MG/DL (ref 0.57–1)
DEPRECATED RDW RBC AUTO: 41.6 FL (ref 37–54)
DEPRECATED RDW RBC AUTO: 42 FL (ref 37–54)
EOSINOPHIL # BLD AUTO: 0.12 10*3/MM3 (ref 0–0.4)
EOSINOPHIL NFR BLD AUTO: 1.6 % (ref 0.3–6.2)
ERYTHROCYTE [DISTWIDTH] IN BLOOD BY AUTOMATED COUNT: 12.3 % (ref 12.3–15.4)
ERYTHROCYTE [DISTWIDTH] IN BLOOD BY AUTOMATED COUNT: 12.4 % (ref 12.3–15.4)
GFR SERPL CREATININE-BSD FRML MDRD: 116 ML/MIN/1.73
GFR SERPL CREATININE-BSD FRML MDRD: 98 ML/MIN/1.73
GLOBULIN UR ELPH-MCNC: 2.2 GM/DL
GLUCOSE SERPL-MCNC: 116 MG/DL (ref 65–99)
GLUCOSE SERPL-MCNC: 124 MG/DL (ref 65–99)
HCT VFR BLD AUTO: 38.5 % (ref 34–46.6)
HCT VFR BLD AUTO: 41.7 % (ref 34–46.6)
HGB BLD-MCNC: 13 G/DL (ref 12–15.9)
HGB BLD-MCNC: 14 G/DL (ref 12–15.9)
IMM GRANULOCYTES # BLD AUTO: 0.02 10*3/MM3 (ref 0–0.05)
IMM GRANULOCYTES NFR BLD AUTO: 0.3 % (ref 0–0.5)
INR PPP: 1.01 (ref 0.9–1.1)
LYMPHOCYTES # BLD AUTO: 2.32 10*3/MM3 (ref 0.7–3.1)
LYMPHOCYTES NFR BLD AUTO: 30.7 % (ref 19.6–45.3)
MCH RBC QN AUTO: 31 PG (ref 26.6–33)
MCH RBC QN AUTO: 31.2 PG (ref 26.6–33)
MCHC RBC AUTO-ENTMCNC: 33.6 G/DL (ref 31.5–35.7)
MCHC RBC AUTO-ENTMCNC: 33.8 G/DL (ref 31.5–35.7)
MCV RBC AUTO: 92.3 FL (ref 79–97)
MCV RBC AUTO: 92.3 FL (ref 79–97)
MONOCYTES # BLD AUTO: 0.6 10*3/MM3 (ref 0.1–0.9)
MONOCYTES NFR BLD AUTO: 7.9 % (ref 5–12)
NEUTROPHILS NFR BLD AUTO: 4.43 10*3/MM3 (ref 1.7–7)
NEUTROPHILS NFR BLD AUTO: 58.7 % (ref 42.7–76)
NRBC BLD AUTO-RTO: 0 /100 WBC (ref 0–0.2)
PLATELET # BLD AUTO: 320 10*3/MM3 (ref 140–450)
PLATELET # BLD AUTO: 335 10*3/MM3 (ref 140–450)
PMV BLD AUTO: 9.5 FL (ref 6–12)
PMV BLD AUTO: 9.5 FL (ref 6–12)
POTASSIUM SERPL-SCNC: 3.7 MMOL/L (ref 3.5–5.2)
POTASSIUM SERPL-SCNC: 4.8 MMOL/L (ref 3.5–5.2)
PROT SERPL-MCNC: 6.7 G/DL (ref 6–8.5)
PROTHROMBIN TIME: 13.1 SECONDS (ref 11.7–14.2)
QT INTERVAL: 376 MS
RBC # BLD AUTO: 4.17 10*6/MM3 (ref 3.77–5.28)
RBC # BLD AUTO: 4.52 10*6/MM3 (ref 3.77–5.28)
SARS-COV-2 RNA PNL SPEC NAA+PROBE: NOT DETECTED
SODIUM SERPL-SCNC: 137 MMOL/L (ref 136–145)
SODIUM SERPL-SCNC: 138 MMOL/L (ref 136–145)
TROPONIN T SERPL-MCNC: <0.01 NG/ML (ref 0–0.03)
TROPONIN T SERPL-MCNC: <0.01 NG/ML (ref 0–0.03)
WBC # BLD AUTO: 12.88 10*3/MM3 (ref 3.4–10.8)
WBC # BLD AUTO: 7.55 10*3/MM3 (ref 3.4–10.8)

## 2021-11-03 PROCEDURE — C1713 ANCHOR/SCREW BN/BN,TIS/BN: HCPCS | Performed by: ORTHOPAEDIC SURGERY

## 2021-11-03 PROCEDURE — 25010000002 ONDANSETRON PER 1 MG: Performed by: ANESTHESIOLOGY

## 2021-11-03 PROCEDURE — 25010000002 NEOSTIGMINE 5 MG/10ML SOLUTION: Performed by: ANESTHESIOLOGY

## 2021-11-03 PROCEDURE — 25010000002 PROPOFOL 10 MG/ML EMULSION: Performed by: ANESTHESIOLOGY

## 2021-11-03 PROCEDURE — 25010000002 ONDANSETRON PER 1 MG: Performed by: EMERGENCY MEDICINE

## 2021-11-03 PROCEDURE — 85730 THROMBOPLASTIN TIME PARTIAL: CPT | Performed by: EMERGENCY MEDICINE

## 2021-11-03 PROCEDURE — 0 CEFAZOLIN IN DEXTROSE 2-4 GM/100ML-% SOLUTION: Performed by: ORTHOPAEDIC SURGERY

## 2021-11-03 PROCEDURE — 25010000002 HYDROMORPHONE PER 4 MG: Performed by: EMERGENCY MEDICINE

## 2021-11-03 PROCEDURE — 76000 FLUOROSCOPY <1 HR PHYS/QHP: CPT

## 2021-11-03 PROCEDURE — 73502 X-RAY EXAM HIP UNI 2-3 VIEWS: CPT

## 2021-11-03 PROCEDURE — 85027 COMPLETE CBC AUTOMATED: CPT | Performed by: NURSE PRACTITIONER

## 2021-11-03 PROCEDURE — 73501 X-RAY EXAM HIP UNI 1 VIEW: CPT

## 2021-11-03 PROCEDURE — 84484 ASSAY OF TROPONIN QUANT: CPT | Performed by: EMERGENCY MEDICINE

## 2021-11-03 PROCEDURE — 71045 X-RAY EXAM CHEST 1 VIEW: CPT

## 2021-11-03 PROCEDURE — 25010000002 MORPHINE PER 10 MG: Performed by: EMERGENCY MEDICINE

## 2021-11-03 PROCEDURE — 80053 COMPREHEN METABOLIC PANEL: CPT | Performed by: EMERGENCY MEDICINE

## 2021-11-03 PROCEDURE — 25010000002 FENTANYL CITRATE (PF) 50 MCG/ML SOLUTION: Performed by: ANESTHESIOLOGY

## 2021-11-03 PROCEDURE — 25010000002 DEXAMETHASONE PER 1 MG: Performed by: ANESTHESIOLOGY

## 2021-11-03 PROCEDURE — 99284 EMERGENCY DEPT VISIT MOD MDM: CPT

## 2021-11-03 PROCEDURE — 93010 ELECTROCARDIOGRAM REPORT: CPT | Performed by: INTERNAL MEDICINE

## 2021-11-03 PROCEDURE — 99222 1ST HOSP IP/OBS MODERATE 55: CPT | Performed by: PSYCHIATRY & NEUROLOGY

## 2021-11-03 PROCEDURE — 85025 COMPLETE CBC W/AUTO DIFF WBC: CPT | Performed by: EMERGENCY MEDICINE

## 2021-11-03 PROCEDURE — 85610 PROTHROMBIN TIME: CPT | Performed by: EMERGENCY MEDICINE

## 2021-11-03 PROCEDURE — 87635 SARS-COV-2 COVID-19 AMP PRB: CPT | Performed by: EMERGENCY MEDICINE

## 2021-11-03 PROCEDURE — 25010000002 MORPHINE PER 10 MG: Performed by: NURSE PRACTITIONER

## 2021-11-03 PROCEDURE — 93005 ELECTROCARDIOGRAM TRACING: CPT

## 2021-11-03 PROCEDURE — 0QS736Z REPOSITION LEFT UPPER FEMUR WITH INTRAMEDULLARY INTERNAL FIXATION DEVICE, PERCUTANEOUS APPROACH: ICD-10-PCS | Performed by: ORTHOPAEDIC SURGERY

## 2021-11-03 DEVICE — TRIGEN INTERTAN 10S 10MM X 18CM 125DEGREE
Type: IMPLANTABLE DEVICE | Site: FEMUR | Status: FUNCTIONAL
Brand: TRIGEN

## 2021-11-03 DEVICE — TRIGEN LOW PROFILE SCREW 5.0MM X 32.5MM
Type: IMPLANTABLE DEVICE | Site: FEMUR | Status: FUNCTIONAL
Brand: TRIGEN

## 2021-11-03 DEVICE — INTERTAN LAG/COMPRESSION SCREW KIT                                    90MM / 85MM
Type: IMPLANTABLE DEVICE | Site: FEMUR | Status: FUNCTIONAL
Brand: TRIGEN

## 2021-11-03 RX ORDER — ONDANSETRON 4 MG/1
4 TABLET, FILM COATED ORAL EVERY 6 HOURS PRN
Status: DISCONTINUED | OUTPATIENT
Start: 2021-11-03 | End: 2021-11-07 | Stop reason: HOSPADM

## 2021-11-03 RX ORDER — ACETAMINOPHEN 160 MG/5ML
650 SOLUTION ORAL EVERY 4 HOURS PRN
Status: DISCONTINUED | OUTPATIENT
Start: 2021-11-03 | End: 2021-11-07 | Stop reason: HOSPADM

## 2021-11-03 RX ORDER — PROMETHAZINE HYDROCHLORIDE 25 MG/1
25 TABLET ORAL ONCE AS NEEDED
Status: DISCONTINUED | OUTPATIENT
Start: 2021-11-03 | End: 2021-11-03 | Stop reason: HOSPADM

## 2021-11-03 RX ORDER — ONDANSETRON 2 MG/ML
INJECTION INTRAMUSCULAR; INTRAVENOUS AS NEEDED
Status: DISCONTINUED | OUTPATIENT
Start: 2021-11-03 | End: 2021-11-03 | Stop reason: SURG

## 2021-11-03 RX ORDER — HYDROCODONE BITARTRATE AND ACETAMINOPHEN 5; 325 MG/1; MG/1
1 TABLET ORAL EVERY 4 HOURS PRN
Status: DISCONTINUED | OUTPATIENT
Start: 2021-11-03 | End: 2021-11-07 | Stop reason: HOSPADM

## 2021-11-03 RX ORDER — MAGNESIUM HYDROXIDE 1200 MG/15ML
LIQUID ORAL AS NEEDED
Status: DISCONTINUED | OUTPATIENT
Start: 2021-11-03 | End: 2021-11-03 | Stop reason: HOSPADM

## 2021-11-03 RX ORDER — LIDOCAINE HYDROCHLORIDE 10 MG/ML
0.5 INJECTION, SOLUTION EPIDURAL; INFILTRATION; INTRACAUDAL; PERINEURAL ONCE AS NEEDED
Status: DISCONTINUED | OUTPATIENT
Start: 2021-11-03 | End: 2021-11-03 | Stop reason: HOSPADM

## 2021-11-03 RX ORDER — MORPHINE SULFATE 2 MG/ML
2 INJECTION, SOLUTION INTRAMUSCULAR; INTRAVENOUS
Status: DISCONTINUED | OUTPATIENT
Start: 2021-11-03 | End: 2021-11-07 | Stop reason: HOSPADM

## 2021-11-03 RX ORDER — EPHEDRINE SULFATE 50 MG/ML
5 INJECTION, SOLUTION INTRAVENOUS ONCE AS NEEDED
Status: DISCONTINUED | OUTPATIENT
Start: 2021-11-03 | End: 2021-11-03 | Stop reason: HOSPADM

## 2021-11-03 RX ORDER — SODIUM CHLORIDE 0.9 % (FLUSH) 0.9 %
10 SYRINGE (ML) INJECTION EVERY 12 HOURS SCHEDULED
Status: DISCONTINUED | OUTPATIENT
Start: 2021-11-03 | End: 2021-11-07 | Stop reason: HOSPADM

## 2021-11-03 RX ORDER — ONDANSETRON 2 MG/ML
4 INJECTION INTRAMUSCULAR; INTRAVENOUS ONCE
Status: COMPLETED | OUTPATIENT
Start: 2021-11-03 | End: 2021-11-03

## 2021-11-03 RX ORDER — IBUPROFEN 600 MG/1
600 TABLET ORAL ONCE AS NEEDED
Status: DISCONTINUED | OUTPATIENT
Start: 2021-11-03 | End: 2021-11-03 | Stop reason: HOSPADM

## 2021-11-03 RX ORDER — HYDROCODONE BITARTRATE AND ACETAMINOPHEN 7.5; 325 MG/1; MG/1
1 TABLET ORAL ONCE AS NEEDED
Status: DISCONTINUED | OUTPATIENT
Start: 2021-11-03 | End: 2021-11-03 | Stop reason: HOSPADM

## 2021-11-03 RX ORDER — SODIUM CHLORIDE, SODIUM LACTATE, POTASSIUM CHLORIDE, CALCIUM CHLORIDE 600; 310; 30; 20 MG/100ML; MG/100ML; MG/100ML; MG/100ML
9 INJECTION, SOLUTION INTRAVENOUS CONTINUOUS
Status: DISCONTINUED | OUTPATIENT
Start: 2021-11-03 | End: 2021-11-07 | Stop reason: HOSPADM

## 2021-11-03 RX ORDER — HYDROCODONE BITARTRATE AND ACETAMINOPHEN 5; 325 MG/1; MG/1
2 TABLET ORAL EVERY 4 HOURS PRN
Status: DISCONTINUED | OUTPATIENT
Start: 2021-11-03 | End: 2021-11-07 | Stop reason: HOSPADM

## 2021-11-03 RX ORDER — ASPIRIN 325 MG
325 TABLET ORAL DAILY
Status: DISCONTINUED | OUTPATIENT
Start: 2021-11-04 | End: 2021-11-05

## 2021-11-03 RX ORDER — SODIUM CHLORIDE 0.9 % (FLUSH) 0.9 %
3-10 SYRINGE (ML) INJECTION AS NEEDED
Status: DISCONTINUED | OUTPATIENT
Start: 2021-11-03 | End: 2021-11-03 | Stop reason: HOSPADM

## 2021-11-03 RX ORDER — SODIUM CHLORIDE 0.9 % (FLUSH) 0.9 %
3 SYRINGE (ML) INJECTION EVERY 12 HOURS SCHEDULED
Status: DISCONTINUED | OUTPATIENT
Start: 2021-11-03 | End: 2021-11-03 | Stop reason: HOSPADM

## 2021-11-03 RX ORDER — FENTANYL CITRATE 50 UG/ML
25 INJECTION, SOLUTION INTRAMUSCULAR; INTRAVENOUS
Status: DISCONTINUED | OUTPATIENT
Start: 2021-11-03 | End: 2021-11-03 | Stop reason: HOSPADM

## 2021-11-03 RX ORDER — CEFAZOLIN SODIUM 2 G/100ML
2 INJECTION, SOLUTION INTRAVENOUS
Status: COMPLETED | OUTPATIENT
Start: 2021-11-04 | End: 2021-11-03

## 2021-11-03 RX ORDER — HYDRALAZINE HYDROCHLORIDE 20 MG/ML
5 INJECTION INTRAMUSCULAR; INTRAVENOUS
Status: DISCONTINUED | OUTPATIENT
Start: 2021-11-03 | End: 2021-11-03 | Stop reason: HOSPADM

## 2021-11-03 RX ORDER — MEMANTINE HYDROCHLORIDE 5 MG/1
5 TABLET ORAL DAILY
Status: DISCONTINUED | OUTPATIENT
Start: 2021-11-03 | End: 2021-11-07 | Stop reason: HOSPADM

## 2021-11-03 RX ORDER — GLYCOPYRROLATE 0.2 MG/ML
INJECTION INTRAMUSCULAR; INTRAVENOUS AS NEEDED
Status: DISCONTINUED | OUTPATIENT
Start: 2021-11-03 | End: 2021-11-03 | Stop reason: SURG

## 2021-11-03 RX ORDER — CEFAZOLIN SODIUM 2 G/100ML
2 INJECTION, SOLUTION INTRAVENOUS EVERY 8 HOURS
Status: COMPLETED | OUTPATIENT
Start: 2021-11-03 | End: 2021-11-04

## 2021-11-03 RX ORDER — HYDROMORPHONE HYDROCHLORIDE 1 MG/ML
0.25 INJECTION, SOLUTION INTRAMUSCULAR; INTRAVENOUS; SUBCUTANEOUS
Status: DISCONTINUED | OUTPATIENT
Start: 2021-11-03 | End: 2021-11-03 | Stop reason: HOSPADM

## 2021-11-03 RX ORDER — PROMETHAZINE HYDROCHLORIDE 25 MG/1
25 SUPPOSITORY RECTAL ONCE AS NEEDED
Status: DISCONTINUED | OUTPATIENT
Start: 2021-11-03 | End: 2021-11-03 | Stop reason: HOSPADM

## 2021-11-03 RX ORDER — DEXAMETHASONE SODIUM PHOSPHATE 10 MG/ML
INJECTION INTRAMUSCULAR; INTRAVENOUS AS NEEDED
Status: DISCONTINUED | OUTPATIENT
Start: 2021-11-03 | End: 2021-11-03 | Stop reason: SURG

## 2021-11-03 RX ORDER — BUDESONIDE 3 MG/1
9 CAPSULE, COATED PELLETS ORAL DAILY
Status: DISCONTINUED | OUTPATIENT
Start: 2021-11-03 | End: 2021-11-07 | Stop reason: HOSPADM

## 2021-11-03 RX ORDER — HYDROMORPHONE HYDROCHLORIDE 1 MG/ML
0.5 INJECTION, SOLUTION INTRAMUSCULAR; INTRAVENOUS; SUBCUTANEOUS ONCE
Status: COMPLETED | OUTPATIENT
Start: 2021-11-03 | End: 2021-11-03

## 2021-11-03 RX ORDER — FENTANYL CITRATE 50 UG/ML
INJECTION, SOLUTION INTRAMUSCULAR; INTRAVENOUS AS NEEDED
Status: DISCONTINUED | OUTPATIENT
Start: 2021-11-03 | End: 2021-11-03 | Stop reason: SURG

## 2021-11-03 RX ORDER — DIPHENHYDRAMINE HCL 25 MG
25 CAPSULE ORAL
Status: DISCONTINUED | OUTPATIENT
Start: 2021-11-03 | End: 2021-11-03 | Stop reason: HOSPADM

## 2021-11-03 RX ORDER — ACETAMINOPHEN 650 MG/1
650 SUPPOSITORY RECTAL EVERY 4 HOURS PRN
Status: DISCONTINUED | OUTPATIENT
Start: 2021-11-03 | End: 2021-11-07 | Stop reason: HOSPADM

## 2021-11-03 RX ORDER — DIAZEPAM 5 MG/ML
5 INJECTION, SOLUTION INTRAMUSCULAR; INTRAVENOUS ONCE
Status: COMPLETED | OUTPATIENT
Start: 2021-11-03 | End: 2021-11-04

## 2021-11-03 RX ORDER — BUPIVACAINE HYDROCHLORIDE AND EPINEPHRINE 5; 5 MG/ML; UG/ML
INJECTION, SOLUTION EPIDURAL; INTRACAUDAL; PERINEURAL AS NEEDED
Status: DISCONTINUED | OUTPATIENT
Start: 2021-11-03 | End: 2021-11-03 | Stop reason: HOSPADM

## 2021-11-03 RX ORDER — FLUMAZENIL 0.1 MG/ML
0.2 INJECTION INTRAVENOUS AS NEEDED
Status: DISCONTINUED | OUTPATIENT
Start: 2021-11-03 | End: 2021-11-03 | Stop reason: HOSPADM

## 2021-11-03 RX ORDER — HYDROCODONE BITARTRATE AND ACETAMINOPHEN 5; 325 MG/1; MG/1
1 TABLET ORAL EVERY 6 HOURS PRN
Status: DISCONTINUED | OUTPATIENT
Start: 2021-11-03 | End: 2021-11-07 | Stop reason: HOSPADM

## 2021-11-03 RX ORDER — ONDANSETRON 2 MG/ML
4 INJECTION INTRAMUSCULAR; INTRAVENOUS EVERY 6 HOURS PRN
Status: DISCONTINUED | OUTPATIENT
Start: 2021-11-03 | End: 2021-11-07 | Stop reason: HOSPADM

## 2021-11-03 RX ORDER — ACETAMINOPHEN 325 MG/1
650 TABLET ORAL EVERY 4 HOURS PRN
Status: DISCONTINUED | OUTPATIENT
Start: 2021-11-03 | End: 2021-11-07 | Stop reason: HOSPADM

## 2021-11-03 RX ORDER — ROCURONIUM BROMIDE 10 MG/ML
INJECTION, SOLUTION INTRAVENOUS AS NEEDED
Status: DISCONTINUED | OUTPATIENT
Start: 2021-11-03 | End: 2021-11-03 | Stop reason: SURG

## 2021-11-03 RX ORDER — DIPHENHYDRAMINE HYDROCHLORIDE 50 MG/ML
12.5 INJECTION INTRAMUSCULAR; INTRAVENOUS
Status: DISCONTINUED | OUTPATIENT
Start: 2021-11-03 | End: 2021-11-03 | Stop reason: HOSPADM

## 2021-11-03 RX ORDER — FENTANYL CITRATE 50 UG/ML
50 INJECTION, SOLUTION INTRAMUSCULAR; INTRAVENOUS
Status: DISCONTINUED | OUTPATIENT
Start: 2021-11-03 | End: 2021-11-03 | Stop reason: HOSPADM

## 2021-11-03 RX ORDER — ONDANSETRON 2 MG/ML
4 INJECTION INTRAMUSCULAR; INTRAVENOUS ONCE AS NEEDED
Status: DISCONTINUED | OUTPATIENT
Start: 2021-11-03 | End: 2021-11-03 | Stop reason: HOSPADM

## 2021-11-03 RX ORDER — SODIUM CHLORIDE 0.9 % (FLUSH) 0.9 %
10 SYRINGE (ML) INJECTION AS NEEDED
Status: DISCONTINUED | OUTPATIENT
Start: 2021-11-03 | End: 2021-11-07 | Stop reason: HOSPADM

## 2021-11-03 RX ORDER — OXYCODONE AND ACETAMINOPHEN 10; 325 MG/1; MG/1
1 TABLET ORAL EVERY 4 HOURS PRN
Status: DISCONTINUED | OUTPATIENT
Start: 2021-11-03 | End: 2021-11-03 | Stop reason: HOSPADM

## 2021-11-03 RX ORDER — NEOSTIGMINE METHYLSULFATE 0.5 MG/ML
INJECTION, SOLUTION INTRAVENOUS AS NEEDED
Status: DISCONTINUED | OUTPATIENT
Start: 2021-11-03 | End: 2021-11-03 | Stop reason: SURG

## 2021-11-03 RX ORDER — CALCIUM CARBONATE 200(500)MG
2 TABLET,CHEWABLE ORAL 2 TIMES DAILY PRN
Status: DISCONTINUED | OUTPATIENT
Start: 2021-11-03 | End: 2021-11-07 | Stop reason: HOSPADM

## 2021-11-03 RX ORDER — NALOXONE HCL 0.4 MG/ML
0.2 VIAL (ML) INJECTION AS NEEDED
Status: DISCONTINUED | OUTPATIENT
Start: 2021-11-03 | End: 2021-11-03 | Stop reason: HOSPADM

## 2021-11-03 RX ORDER — LIDOCAINE HYDROCHLORIDE 20 MG/ML
INJECTION, SOLUTION INFILTRATION; PERINEURAL AS NEEDED
Status: DISCONTINUED | OUTPATIENT
Start: 2021-11-03 | End: 2021-11-03 | Stop reason: SURG

## 2021-11-03 RX ORDER — FAMOTIDINE 10 MG/ML
20 INJECTION, SOLUTION INTRAVENOUS ONCE
Status: COMPLETED | OUTPATIENT
Start: 2021-11-03 | End: 2021-11-03

## 2021-11-03 RX ORDER — LABETALOL HYDROCHLORIDE 5 MG/ML
5 INJECTION, SOLUTION INTRAVENOUS
Status: DISCONTINUED | OUTPATIENT
Start: 2021-11-03 | End: 2021-11-03 | Stop reason: HOSPADM

## 2021-11-03 RX ORDER — L.ACID,PARA/B.BIFIDUM/S.THERM 8B CELL
1 CAPSULE ORAL DAILY
Status: DISCONTINUED | OUTPATIENT
Start: 2021-11-03 | End: 2021-11-07 | Stop reason: HOSPADM

## 2021-11-03 RX ORDER — PROPOFOL 10 MG/ML
VIAL (ML) INTRAVENOUS AS NEEDED
Status: DISCONTINUED | OUTPATIENT
Start: 2021-11-03 | End: 2021-11-03 | Stop reason: SURG

## 2021-11-03 RX ORDER — MORPHINE SULFATE 2 MG/ML
4 INJECTION, SOLUTION INTRAMUSCULAR; INTRAVENOUS ONCE
Status: COMPLETED | OUTPATIENT
Start: 2021-11-03 | End: 2021-11-03

## 2021-11-03 RX ADMIN — Medication 1 CAPSULE: at 14:24

## 2021-11-03 RX ADMIN — HYDROMORPHONE HYDROCHLORIDE 0.5 MG: 1 INJECTION, SOLUTION INTRAMUSCULAR; INTRAVENOUS; SUBCUTANEOUS at 02:28

## 2021-11-03 RX ADMIN — FAMOTIDINE 20 MG: 10 INJECTION INTRAVENOUS at 06:55

## 2021-11-03 RX ADMIN — FENTANYL CITRATE 50 MCG: 50 INJECTION INTRAMUSCULAR; INTRAVENOUS at 08:36

## 2021-11-03 RX ADMIN — BUDESONIDE 9 MG: 3 CAPSULE, GELATIN COATED ORAL at 14:24

## 2021-11-03 RX ADMIN — ONDANSETRON 4 MG: 2 INJECTION INTRAMUSCULAR; INTRAVENOUS at 07:42

## 2021-11-03 RX ADMIN — LIDOCAINE HYDROCHLORIDE 60 MG: 20 INJECTION, SOLUTION INFILTRATION; PERINEURAL at 07:09

## 2021-11-03 RX ADMIN — MORPHINE SULFATE 2 MG: 2 INJECTION, SOLUTION INTRAMUSCULAR; INTRAVENOUS at 04:19

## 2021-11-03 RX ADMIN — DEXAMETHASONE SODIUM PHOSPHATE 8 MG: 10 INJECTION INTRAMUSCULAR; INTRAVENOUS at 07:15

## 2021-11-03 RX ADMIN — PROPOFOL 50 MG: 10 INJECTION, EMULSION INTRAVENOUS at 07:09

## 2021-11-03 RX ADMIN — SODIUM CHLORIDE, PRESERVATIVE FREE 10 ML: 5 INJECTION INTRAVENOUS at 22:57

## 2021-11-03 RX ADMIN — CEFAZOLIN SODIUM 2 G: 2 INJECTION, SOLUTION INTRAVENOUS at 22:39

## 2021-11-03 RX ADMIN — SODIUM CHLORIDE, PRESERVATIVE FREE 10 ML: 5 INJECTION INTRAVENOUS at 05:57

## 2021-11-03 RX ADMIN — SUGAMMADEX 200 MG: 100 INJECTION, SOLUTION INTRAVENOUS at 07:55

## 2021-11-03 RX ADMIN — FENTANYL CITRATE 50 MCG: 50 INJECTION INTRAMUSCULAR; INTRAVENOUS at 06:55

## 2021-11-03 RX ADMIN — HYDROCODONE BITARTRATE AND ACETAMINOPHEN 1 TABLET: 5; 325 TABLET ORAL at 18:33

## 2021-11-03 RX ADMIN — MORPHINE SULFATE 4 MG: 2 INJECTION, SOLUTION INTRAMUSCULAR; INTRAVENOUS at 00:59

## 2021-11-03 RX ADMIN — SODIUM CHLORIDE, POTASSIUM CHLORIDE, SODIUM LACTATE AND CALCIUM CHLORIDE 9 ML/HR: 600; 310; 30; 20 INJECTION, SOLUTION INTRAVENOUS at 06:45

## 2021-11-03 RX ADMIN — MEMANTINE HYDROCHLORIDE 5 MG: 5 TABLET, FILM COATED ORAL at 14:24

## 2021-11-03 RX ADMIN — CEFAZOLIN SODIUM 2 G: 2 INJECTION, SOLUTION INTRAVENOUS at 07:07

## 2021-11-03 RX ADMIN — NEOSTIGMINE METHYLSULFATE 3.5 MG: 0.5 INJECTION INTRAVENOUS at 07:46

## 2021-11-03 RX ADMIN — FENTANYL CITRATE 25 MCG: 50 INJECTION INTRAMUSCULAR; INTRAVENOUS at 09:23

## 2021-11-03 RX ADMIN — FENTANYL CITRATE 50 MCG: 0.05 INJECTION, SOLUTION INTRAMUSCULAR; INTRAVENOUS at 07:09

## 2021-11-03 RX ADMIN — HYDROCODONE BITARTRATE AND ACETAMINOPHEN 1 TABLET: 5; 325 TABLET ORAL at 22:39

## 2021-11-03 RX ADMIN — GLYCOPYRROLATE 0.6 MG: 0.2 INJECTION INTRAMUSCULAR; INTRAVENOUS at 07:46

## 2021-11-03 RX ADMIN — ROCURONIUM BROMIDE 25 MG: 50 INJECTION INTRAVENOUS at 07:09

## 2021-11-03 RX ADMIN — ONDANSETRON 4 MG: 2 INJECTION INTRAMUSCULAR; INTRAVENOUS at 00:59

## 2021-11-03 RX ADMIN — CEFAZOLIN SODIUM 2 G: 2 INJECTION, SOLUTION INTRAVENOUS at 15:26

## 2021-11-03 NOTE — ANESTHESIA POSTPROCEDURE EVALUATION
"Patient: Samantha Davila    Procedure Summary     Date: 11/03/21 Room / Location: [facility] OR  / [facility] MAIN OR    Anesthesia Start: 0703 Anesthesia Stop: 0806    Procedure: FEMUR INTRAMEDULLARY NAILING/RODDING (Left Thigh) Diagnosis:     Surgeons: Michael Gillespie II, MD Provider: Sparkle Nobles MD    Anesthesia Type: general ASA Status: 3          Anesthesia Type: general    Vitals  Vitals Value Taken Time   /76 11/03/21 1046   Temp 36.4 °C (97.6 °F) 11/03/21 0802   Pulse 93 11/03/21 1053   Resp 18 11/03/21 1045   SpO2 98 % 11/03/21 1055   Vitals shown include unvalidated device data.        Post Anesthesia Care and Evaluation    Patient location during evaluation: bedside  Patient participation: complete - patient participated  Level of consciousness: awake and alert  Pain score: 0  Pain management: adequate  Airway patency: patent  Anesthetic complications: No anesthetic complications    Cardiovascular status: acceptable  Respiratory status: acceptable  Hydration status: acceptable    Comments: /76   Pulse 72   Temp 36.4 °C (97.6 °F) (Oral)   Resp 18   Ht 167.6 cm (66\")   SpO2 100%   BMI 19.17 kg/m²       "

## 2021-11-03 NOTE — NURSING NOTE
Called MRI screening sheet info to [facility]; they will call before they send for her so we can give Valium prior to

## 2021-11-03 NOTE — OP NOTE
Intertrochanteric/Subtrochanteric Fracture Operative Note  Dr. JUAN MIGUEL Gillespie II  [phone]    PATIENT NAME: Samantha Davila  MRN: [id number]  : 1940 AGE: 81 y.o. GENDER: female  DATE OF OPERATION: 11/3/2021  PREOPERATIVE DIAGNOSIS: Hip Fracture  POSTOPERATIVE DIAGNOSIS: Hip Fracture  OPERATION PERFORMED: Left Intramedullary Nailing of the Intertrochanteric Hip Fracture.  SURGEON: Michael Gillespie MD  Circulator: Polly Gomez RN  : Ryan Baldwin  Scryoung Person: Amber Valdez  Vendor Representative: Jani Gill  ANESTHESIA: General  ASSISTANT: None   ESTIMATED BLOOD LOSS: 50cc  SPONGE AND NEEDLE COUNT: Correct  INDICATIONS: This patient was found to have an Intertrochanteric Hip Fracture after evaluation in the ER. An orthopaedic consult was placed for management. A discussion of operative versus nonoperative treatment was had with the patient and/or family. They elected to undergo intramedullary nailing of the hip fracture. The risks of surgery were discussed and included the risk of anesthesia, nonunion, malunion, infection, damage to neurovascular structures, implant loosening/fialure, fracture, hardware prominence, the need for further procedures, medical complications, and others. No guarantees were made. The patient wished to proceed with surgery and a surgical consent was signed.  COMPONENTS:   · [employer] InterTAN nail    PERTINENT FINDINGS: Hip Fracture    DETAILS OF PROCEDURE:   The patient was met in the preoperative area. The site was marked. The consent and H&P were reviewed. The patient was then wheeled back to the operative suite and underwent anesthesia. The [place] table boots were secured to the patients’ feet. The patient was moved onto the Stop[place] table and secured in the supine position. The perineal post was inserted and the boots were secured into the leg holders. Surgical alcohol was used to thoroughly clean the operative  area.    REDUCTION  Fluoroscopy was used to visualize the fracture on both an AP and lateral.  Traction, rotation, flexion/extension, abduction/abduction was used as needed to adequately reduce the hip fracture.    The hip and leg were then prepped in the normal sterile fashion, which included Chloroprep and multiple layers of sterile drapes. A surgical timeout was performed in which administration of preoperative antibiotics and the surgical site were confirmed.    A small incision was made just proximal to the greater trochanter and a starting pin was drilled into the tip of the greater trochanter using fluoroscopy to confirm proper location. The opening reamer was then used to open the canal down to the lesser trochanter, visualizing propper position again using fluoroscopy.     OPEN REDUCTION  As fluoroscopic images on both an AP and lateral demonstrated adequate reduction of the fracture, no open techniques needed to be utilized for this case.     NAIL INSERTION  SHORT NAIL: A short nail was then inserted into the femur through the hole made by the opening reamer. The lag screw and compression screw were placed into the femoral head after being drilled and measured, again using fluoroscopy to place the screws in optimal position within the femoral head on both the AP and Lateral views, close to center/center position. Traction was released at this point and the fracture was compressed through the nail. Finally the short nail was secured with one distal interlocking screw which was placed using the jig assembly for the short nail. Final X-Rays showed the fracture reduction to have been maintained and the implant in optimal position.     The wounds were irrigated and closed in layers.  The wound was injected with an analgesic coctail.  A sterile dressing was then placed over the incisions. The patient was moved from the [place] table to the [facility] where the boots were removed. The patient was taken to the recovery  "room in stable condition. There were no complications and the patient tolerated the procedure well.    R \"Jayjay\" Jose Miguel DONNELLY MD  Orthopaedic Surgery  [facility]  [phone]    "

## 2021-11-03 NOTE — CONSULTS
Patient Identification:  NAME:  Samantha Davila  Age:  81 y.o.   Sex:  female   :  1940   MRN:  [id number]       Chief complaint: sHe does not have one, reason for consult progressive decline in mental function    History of present illness: Patient is an 81-year-old right-handed white female with a history of asthma arthritis insomnia who comes to the hospital after she fell and broke her left hip I have been consulted because of progressive decline in her mental function.  The quality is apparently some intermittent confusion and memory function not as good as it used to be duration not known quality as described location she broke her left hip modifying factors none she does have normal B12 and has not had a CT or MRI of her head at this point since hospitalized here.      Past medical history:  Past Medical History:   Diagnosis Date   • Arthritis    • Asthma    • Colitis, collagenous    • Colon polyp    • Insomnia        Past surgical history:  Past Surgical History:   Procedure Laterality Date   • BACK SURGERY     • ROTATOR CUFF REPAIR Left    • TONSILLECTOMY     • TOTAL HIP ARTHROPLASTY Right 3/4/2019    Procedure: TOTAL HIP ARTHROPLASTY ANTERIOR WITH HANA TABLE;  Surgeon: Michael Gillespie II, MD;  Location: [facility];  Service: Orthopedics       Allergies:  Quinine derivatives and Sulfa antibiotics    Home medications:  Medications Prior to Admission   Medication Sig Dispense Refill Last Dose   • Cetirizine-Pseudoephedrine (WAL-ZYR D PO) TAKE 1 TABLET BY MOUTH TWICE DAILY   2021 at Unknown time   • Cholecalciferol (VITAMIN D3) 2000 units tablet Take  by mouth.   2021 at Unknown time   • Loperamide HCl (IMODIUM PO) Take  by mouth.   2021 at Unknown time   • memantine (NAMENDA) 5 MG tablet Take 1 tablet by mouth Daily. 30 tablet 0 2021 at Unknown time   • Multiple Vitamins-Minerals (WOMENS 50+ MULTI VITAMIN/MIN PO) Take  by mouth.   2021 at Unknown time   • Probiotic  Product (PROBIOTIC DAILY PO) Take  by mouth.   11/2/2021 at Unknown time   • zolpidem (AMBIEN) 5 MG tablet TAKE 1 TABLET BY MOUTH AT NIGHT AS NEEDED FOR SLEEP 30 tablet 5 11/2/2021 at Unknown time   • Budesonide (ENTOCORT EC) 3 MG 24 hr capsule TAKE 3 CS PO QD  12         Hospital medications:  [START ON 11/4/2021] aspirin, 325 mg, Oral, Daily  Budesonide, 9 mg, Oral, Daily  ceFAZolin, 2 g, Intravenous, Q8H  lactobacillus acidophilus, 1 capsule, Oral, Daily  memantine, 5 mg, Oral, Daily  sodium chloride, 10 mL, Intravenous, Q12H      lactated ringers, 9 mL/hr, Last Rate: 9 mL/hr (11/03/21 0703)      •  acetaminophen **OR** acetaminophen **OR** acetaminophen  •  calcium carbonate  •  HYDROcodone-acetaminophen  •  HYDROcodone-acetaminophen  •  HYDROcodone-acetaminophen  •  Morphine  •  ondansetron **OR** ondansetron  •  [COMPLETED] Insert peripheral IV **AND** sodium chloride  •  sodium chloride    Family history:  Family History   Problem Relation Age of Onset   • Osteoporosis Mother    • Thyroid cancer Mother    • Lung cancer Father         HEART   • Breast cancer Paternal Grandmother        Social history:  Social History     Tobacco Use   • Smoking status: Never Smoker   • Smokeless tobacco: Never Used   Vaping Use   • Vaping Use: Never used   Substance Use Topics   • Alcohol use: Yes   • Drug use: Defer       Review of systems:    She could not really tell me anything about this no family is present I reviewed the chart fully    Objective:  Vitals Ranges:   Temp:  [97.1 °F (36.2 °C)-98.2 °F (36.8 °C)] 98.1 °F (36.7 °C)  Heart Rate:  [63-99] 80  Resp:  [14-18] 14  BP: (109-149)/(70-97) 127/76      Physical Exam:  He is awake and alert eventually she is oriented x3 fund of knowledge fair attention span concentration good recent remote memory fair language function normal well-developed well-nourished in no distress pupils 2 constricting slightly bilaterally extraocular movements full without nystagmus nasolabial  folds palate tongue symmetrical normal hearing facial sensation head turning shoulder shrug motor 5 out of 5 x 3 extremities.  No some distal atrophy no fasciculations or resting tremor reflexes trace throughout symmetrical toes downgoing bilaterally normal light touch face both arms both legs coordination normal in the upper extremities Station gait was impossible she has a broken left hip heart regular without murmur neck supple without bruits extremities no clubbing cyanosis edema visual acuity normal at 3 feet    Results review:   I reviewed the patient's new clinical results.    Data review:  Lab Results (last 24 hours)     Procedure Component Value Units Date/Time    CBC (No Diff) [[id number]]  (Abnormal) Collected: 11/03/21 0604    Specimen: Blood Updated: 11/03/21 0659     WBC 12.88 10*3/mm3      RBC 4.17 10*6/mm3      Hemoglobin 13.0 g/dL      Hematocrit 38.5 %      MCV 92.3 fL      MCH 31.2 pg      MCHC 33.8 g/dL      RDW 12.4 %      RDW-SD 42.0 fl      MPV 9.5 fL      Platelets 335 10*3/mm3     Basic Metabolic Panel [[id number]]  (Abnormal) Collected: 11/03/21 0604    Specimen: Blood Updated: 11/03/21 0639     Glucose 116 mg/dL      BUN 10 mg/dL      Creatinine 0.51 mg/dL      Sodium 137 mmol/L      Potassium 4.8 mmol/L      Comment: Slight hemolysis detected by analyzer. Results may be affected.        Chloride 102 mmol/L      CO2 26.2 mmol/L      Calcium 8.6 mg/dL      eGFR Non African Amer 116 mL/min/1.73      BUN/Creatinine Ratio 19.6     Anion Gap 8.8 mmol/L     Narrative:      GFR Normal >60  Chronic Kidney Disease <60  Kidney Failure <15      Troponin [[id number]]  (Normal) Collected: 11/03/21 0226    Specimen: Blood Updated: 11/03/21 0309     Troponin T <0.010 ng/mL     Narrative:      Troponin T Reference Range:  <= 0.03 ng/mL-   Negative for AMI  >0.03 ng/mL-     Abnormal for myocardial necrosis.  Clinicians would have to utilize clinical acumen, EKG, Troponin and serial changes to determine if it  is an Acute Myocardial Infarction or myocardial injury due to an underlying chronic condition.       Results may be falsely decreased if patient taking Biotin.      COVID PRE-OP / PRE-PROCEDURE SCREENING ORDER (NO ISOLATION) - Swab, Nasopharynx [[id number]]  (Normal) Collected: 11/03/21 0227    Specimen: Swab from Nasopharynx Updated: 11/03/21 0255    Narrative:      The following orders were created for panel order COVID PRE-OP / PRE-PROCEDURE SCREENING ORDER (NO ISOLATION) - Swab, Nasopharynx.  Procedure                               Abnormality         Status                     ---------                               -----------         ------                     COVID-19,[facility] IN-HOUSE...[[id number]]  Normal              Final result                 Please view results for these tests on the individual orders.    COVID-19,[facility]HOUSE CEPHEID/LACEY NP SWAB IN TRANSPORT MEDIA 8-12 HR TAT - Swab, Nasopharynx [[id number]]  (Normal) Collected: 11/03/21 0227    Specimen: Swab from Nasopharynx Updated: 11/03/21 0255     COVID19 Not Detected    Narrative:      Fact sheet for providers: https://www.fda.gov/media/101917/download    Fact sheet for patients: https://www.fda.gov/media/351662/download    Test performed by PCR.    Troponin [[id number]]  (Normal) Collected: 11/03/21 0058    Specimen: Blood Updated: 11/03/21 0147     Troponin T <0.010 ng/mL     Narrative:      Troponin T Reference Range:  <= 0.03 ng/mL-   Negative for AMI  >0.03 ng/mL-     Abnormal for myocardial necrosis.  Clinicians would have to utilize clinical acumen, EKG, Troponin and serial changes to determine if it is an Acute Myocardial Infarction or myocardial injury due to an underlying chronic condition.       Results may be falsely decreased if patient taking Biotin.      aPTT [[id number]]  (Normal) Collected: 11/03/21 0058    Specimen: Blood Updated: 11/03/21 0136     PTT 25.5 seconds     Protime-INR [[id number]]  (Normal) Collected: 11/03/21 0058     Specimen: Blood Updated: 11/03/21 0133     Protime 13.1 Seconds      INR 1.01    Comprehensive Metabolic Panel [[id number]]  (Abnormal) Collected: 11/03/21 0058    Specimen: Blood Updated: 11/03/21 0128     Glucose 124 mg/dL      BUN 9 mg/dL      Creatinine 0.59 mg/dL      Sodium 138 mmol/L      Potassium 3.7 mmol/L      Chloride 100 mmol/L      CO2 27.5 mmol/L      Calcium 9.1 mg/dL      Total Protein 6.7 g/dL      Albumin 4.50 g/dL      ALT (SGPT) 18 U/L      AST (SGOT) 20 U/L      Alkaline Phosphatase 67 U/L      Total Bilirubin 0.3 mg/dL      eGFR Non African Amer 98 mL/min/1.73      Globulin 2.2 gm/dL      A/G Ratio 2.0 g/dL      BUN/Creatinine Ratio 15.3     Anion Gap 10.5 mmol/L     Narrative:      GFR Normal >60  Chronic Kidney Disease <60  Kidney Failure <15      CBC & Differential [[id number]]  (Normal) Collected: 11/03/21 0058    Specimen: Blood Updated: 11/03/21 0119    Narrative:      The following orders were created for panel order CBC & Differential.  Procedure                               Abnormality         Status                     ---------                               -----------         ------                     CBC Auto Differential[[id number]]        Normal              Final result                 Please view results for these tests on the individual orders.    CBC Auto Differential [[id number]]  (Normal) Collected: 11/03/21 0058    Specimen: Blood Updated: 11/03/21 0119     WBC 7.55 10*3/mm3      RBC 4.52 10*6/mm3      Hemoglobin 14.0 g/dL      Hematocrit 41.7 %      MCV 92.3 fL      MCH 31.0 pg      MCHC 33.6 g/dL      RDW 12.3 %      RDW-SD 41.6 fl      MPV 9.5 fL      Platelets 320 10*3/mm3      Neutrophil % 58.7 %      Lymphocyte % 30.7 %      Monocyte % 7.9 %      Eosinophil % 1.6 %      Basophil % 0.8 %      Immature Grans % 0.3 %      Neutrophils, Absolute 4.43 10*3/mm3      Lymphocytes, Absolute 2.32 10*3/mm3      Monocytes, Absolute 0.60 10*3/mm3      Eosinophils, Absolute 0.12  10*3/mm3      Basophils, Absolute 0.06 10*3/mm3      Immature Grans, Absolute 0.02 10*3/mm3      nRBC 0.0 /100 WBC            Imaging:  Imaging Results (Last 24 Hours)     Procedure Component Value Units Date/Time    XR Hip With or Without Pelvis 1 View Left [[id number]] Collected: 11/03/21 0912     Updated: 11/03/21 0915    Narrative:      XR HIP W OR WO PELVIS 1 VIEW LEFT-     POSTOP PORTABLE SINGLE VIEW LEFT HIP     CLINICAL INFORMATION: Post arthroplasty     FINDINGS: Hardware satisfactory in position. A complicating process is  not identified.     This report was finalized on 11/3/2021 9:12 AM by Dr. Enrique Ruby M.D.       XR Hip With or Without Pelvis 2 - 3 View Left [[id number]] Collected: 11/03/21 0842     Updated: 11/03/21 0846    Narrative:      XR HIP W OR WO PELVIS 2-3 VIEW LEFT-     Clinical: Fracture fixation     Fluoroscopy time 28 seconds     3 C-arm images submitted from the operative suite     FINDINGS: Intraoperative C-arm images of the left hip taken for the  purpose of visualization and localization during medullary augusto and nail  placement. Near anatomic alignment at the fracture site.     This report was finalized on 11/3/2021 8:43 AM by Dr. Enrique Ruby M.D.       FL C Arm During Surgery [[id number]] Resulted: 11/03/21 0756     Updated: 11/03/21 0756    Narrative:      This procedure was auto-finalized with no dictation required.    XR Hip With or Without Pelvis 2 - 3 View Left [[id number]] Collected: 11/03/21 0144     Updated: 11/03/21 0149    Narrative:      AP VIEW THE PELVIS +2 VIEWS LEFT HIP     HISTORY: Fall     COMPARISON: 03/04/2019     FINDINGS:  The patient has an intertrochanteric fracture of the left femur. No  additional acute fractures are seen. Patient is status post right hip  arthroplasty. Hardware appears stable in position when compared to the  immediate postoperative exam.       Impression:      Intertrochanteric fracture of the left femur.     This report was finalized  on 11/3/2021 1:46 AM by Dr. Padmini Rosenbaum M.D.       XR Chest 1 View [[id number]] Collected: 11/03/21 0141     Updated: 11/03/21 0145    Narrative:      SINGLE VIEW OF THE CHEST     HISTORY: Shortness of air     COMPARISON: 03/02/2019     FINDINGS:  Cardiomegaly is present. There is tortuosity and ectasia of the thoracic  aorta. This is stable finding when compared to prior study. No  pneumothorax, pleural effusion, or acute infiltrate is seen.       Impression:      No acute findings.     This report was finalized on 11/3/2021 1:42 AM by Dr. Padmini Rosenbaum M.D.         PPE worn at all times washed before washed afterwards disposed of everything properly was now within 6 feet of her for more than few minutes during my exam no aerosols used at any point      Assessment and Plan:     According to the family members and history this patient has had a progressive decline in her mental function.  I am confident she does have some degree of dementia but does not look like an acute stroke or TIA syndrome.  Her B12 is normal I have checked a folate level and TSH for in the morning and I have ordered an MRI of the brain with and without contrast this will rule out any brain mass or acute/chronic stroke syndrome.  Otherwise this is a patient who does not look like Parkinson's disease and is suffered a fall fracturing her left hip.      Jani Flores MD  11/03/21  15:03 EDT

## 2021-11-03 NOTE — ED NOTES
Patient was wearing a face mask throughout our encounter.  I wore a N95 and face shield throughout the encounter.  Hand hygiene was performed before and after patient encounter.         Nela Oquendo RN  11/03/21 0133

## 2021-11-03 NOTE — CONSULTS
Orthopaedic Surgery  Hip Fracture Consult Note  Dr. JUAN MIGUEL Jones” Jose Miguel DONNELLY  [phone]    HPI:  Patient is a 81 y.o. Not  or  female who presents with hip pain after a fall from standing.  They presented to the ER for further workup where a left Intertrochanteric Hip Fracture was found. I was consulted for further management.  This patient is well-known to me from a prior hip fracture on her right side for which she had a hip replacement.  She complains of sharp pains in the left groin worse with activity.  She has been unable to ambulate since her injury    MEDICAL HISTORY  Past Medical History:   Diagnosis Date   • Arthritis    • Asthma    • Colitis, collagenous    • Colon polyp    • Insomnia    ·   Past Surgical History:   Procedure Laterality Date   • BACK SURGERY     • ROTATOR CUFF REPAIR Left    • TONSILLECTOMY     • TOTAL HIP ARTHROPLASTY Right 3/4/2019    Procedure: TOTAL HIP ARTHROPLASTY ANTERIOR WITH HANA TABLE;  Surgeon: Michael Gillespie II, MD;  Location: [facility];  Service: Orthopedics   ·   Prior to Admission medications    Medication Sig Start Date End Date Taking? Authorizing Provider   Budesonide (ENTOCORT EC) 3 MG 24 hr capsule TAKE 3 CS PO QD 5/27/16   Hallie Shipley MD   Cetirizine-Pseudoephedrine (WAL-ZYR D PO) TAKE 1 TABLET BY MOUTH TWICE DAILY 7/27/21   Hallie Shipley MD   Cholecalciferol (VITAMIN D3) 2000 units tablet Take  by mouth.    Hallie Shipley MD   Loperamide HCl (IMODIUM PO) Take  by mouth.    Hallie Shipley MD   memantine (NAMENDA) 5 MG tablet Take 1 tablet by mouth Daily. 10/27/21   All Mistry MD   Multiple Vitamins-Minerals (WOMENS 50+ MULTI VITAMIN/MIN PO) Take  by mouth.    Hallie Shipley MD   Probiotic Product (PROBIOTIC DAILY PO) Take  by mouth.    Hallie Shipley MD   zolpidem (AMBIEN) 5 MG tablet TAKE 1 TABLET BY MOUTH AT NIGHT AS NEEDED FOR SLEEP 10/12/21   Chang Borrego MD   ·   Allergies  "  Allergen Reactions   • Quinine Derivatives    • Sulfa Antibiotics    ·   Most Recent Immunizations   Administered Date(s) Administered   • COVID-19 (PFIZER) 03/02/2021   • FLUAD TRI 65YR+ 11/19/2019   • Fluad Quad 65+ 09/17/2020   • Fluzone High Dose =>65 Years ([employer] ONLY) 11/19/2019   • Pneumococcal Conjugate 13-Valent (PCV13) 03/23/2021   • Pneumococcal Polysaccharide (PPSV23) 02/14/2020   • Tdap 03/02/2019   ·   Social History     Tobacco Use   • Smoking status: Never Smoker   • Smokeless tobacco: Never Used   Substance Use Topics   • Alcohol use: Yes   ·    Social History     Substance and Sexual Activity   Drug Use Defer   ·     VITALS: /87 (BP Location: Right arm, Patient Position: Lying)   Pulse 84   Temp 98.2 °F (36.8 °C) (Oral)   Resp 18   Ht 167.6 cm (66\")   SpO2 92%   BMI 19.17 kg/m²  Body mass index is 19.17 kg/m².    PHYSICAL EXAM:   · CONSTITUTIONAL: No acute distress  · LUNGS: Equal chest rise, no shortness of air  · CARDIOVASCULAR: palpable peripheral pulses  · SKIN: no skin lesions in the area examined  · LYMPH: no lymphadenopathy in the area examined  · Left Lower Extremity  · Tenderness to Palpation: Tenderness to palpation at the hip  · Pulses:  Palpable DP/PT pulses  · Sensation: Sensation intact to light touch to saphenous/sural/deep peroneal/superficial peroneal/tibial nerves  · Motor: 5 out of 5 EHL/FHL/TA/GS motor complexes  · Range of Motion: Range of motion of hip deferred secondary to pain.  Positive pain with passive leg roll    RADIOLOGY REVIEW:   XR Chest 1 View    Result Date: 11/3/2021  No acute findings.  This report was finalized on 11/3/2021 1:42 AM by Dr. Padmini Rosenbaum M.D.      XR Hip With or Without Pelvis 2 - 3 View Left    Result Date: 11/3/2021  Intertrochanteric fracture of the left femur.  This report was finalized on 11/3/2021 1:46 AM by Dr. Padmini Rosenbaum M.D.        LABS:   Recent Results (from the past 24 hour(s))   Comprehensive Metabolic Panel "    Collection Time: 11/03/21 12:58 AM    Specimen: Blood   Result Value Ref Range    Glucose 124 (H) 65 - 99 mg/dL    BUN 9 8 - 23 mg/dL    Creatinine 0.59 0.57 - 1.00 mg/dL    Sodium 138 136 - 145 mmol/L    Potassium 3.7 3.5 - 5.2 mmol/L    Chloride 100 98 - 107 mmol/L    CO2 27.5 22.0 - 29.0 mmol/L    Calcium 9.1 8.6 - 10.5 mg/dL    Total Protein 6.7 6.0 - 8.5 g/dL    Albumin 4.50 3.50 - 5.20 g/dL    ALT (SGPT) 18 1 - 33 U/L    AST (SGOT) 20 1 - 32 U/L    Alkaline Phosphatase 67 39 - 117 U/L    Total Bilirubin 0.3 0.0 - 1.2 mg/dL    eGFR Non African Amer 98 >60 mL/min/1.73    Globulin 2.2 gm/dL    A/G Ratio 2.0 g/dL    BUN/Creatinine Ratio 15.3 7.0 - 25.0    Anion Gap 10.5 5.0 - 15.0 mmol/L   Protime-INR    Collection Time: 11/03/21 12:58 AM    Specimen: Blood   Result Value Ref Range    Protime 13.1 11.7 - 14.2 Seconds    INR 1.01 0.90 - 1.10   aPTT    Collection Time: 11/03/21 12:58 AM    Specimen: Blood   Result Value Ref Range    PTT 25.5 22.7 - 35.4 seconds   Troponin    Collection Time: 11/03/21 12:58 AM    Specimen: Blood   Result Value Ref Range    Troponin T <0.010 0.000 - 0.030 ng/mL   CBC Auto Differential    Collection Time: 11/03/21 12:58 AM    Specimen: Blood   Result Value Ref Range    WBC 7.55 3.40 - 10.80 10*3/mm3    RBC 4.52 3.77 - 5.28 10*6/mm3    Hemoglobin 14.0 12.0 - 15.9 g/dL    Hematocrit 41.7 34.0 - 46.6 %    MCV 92.3 79.0 - 97.0 fL    MCH 31.0 26.6 - 33.0 pg    MCHC 33.6 31.5 - 35.7 g/dL    RDW 12.3 12.3 - 15.4 %    RDW-SD 41.6 37.0 - 54.0 fl    MPV 9.5 6.0 - 12.0 fL    Platelets 320 140 - 450 10*3/mm3    Neutrophil % 58.7 42.7 - 76.0 %    Lymphocyte % 30.7 19.6 - 45.3 %    Monocyte % 7.9 5.0 - 12.0 %    Eosinophil % 1.6 0.3 - 6.2 %    Basophil % 0.8 0.0 - 1.5 %    Immature Grans % 0.3 0.0 - 0.5 %    Neutrophils, Absolute 4.43 1.70 - 7.00 10*3/mm3    Lymphocytes, Absolute 2.32 0.70 - 3.10 10*3/mm3    Monocytes, Absolute 0.60 0.10 - 0.90 10*3/mm3    Eosinophils, Absolute 0.12 0.00 - 0.40  10*3/mm3    Basophils, Absolute 0.06 0.00 - 0.20 10*3/mm3    Immature Grans, Absolute 0.02 0.00 - 0.05 10*3/mm3    nRBC 0.0 0.0 - 0.2 /100 WBC   ECG 12 Lead    Collection Time: 11/03/21  1:36 AM   Result Value Ref Range    QT Interval 376 ms   Troponin    Collection Time: 11/03/21  2:26 AM    Specimen: Blood   Result Value Ref Range    Troponin T <0.010 0.000 - 0.030 ng/mL   COVID-19, [facility] IN-HOUSE CEPHEID/LACEY NP SWAB IN TRANSPORT MEDIA 8-12 HR TAT - Swab, Nasopharynx    Collection Time: 11/03/21  2:27 AM    Specimen: Nasopharynx; Swab   Result Value Ref Range    COVID19 Not Detected Not Detected - Ref. Range   CBC (No Diff)    Collection Time: 11/03/21  6:04 AM    Specimen: Blood   Result Value Ref Range    WBC 12.88 (H) 3.40 - 10.80 10*3/mm3    RBC 4.17 3.77 - 5.28 10*6/mm3    Hemoglobin 13.0 12.0 - 15.9 g/dL    Hematocrit 38.5 34.0 - 46.6 %    MCV 92.3 79.0 - 97.0 fL    MCH 31.2 26.6 - 33.0 pg    MCHC 33.8 31.5 - 35.7 g/dL    RDW 12.4 12.3 - 15.4 %    RDW-SD 42.0 37.0 - 54.0 fl    MPV 9.5 6.0 - 12.0 fL    Platelets 335 140 - 450 10*3/mm3   Basic Metabolic Panel    Collection Time: 11/03/21  6:04 AM    Specimen: Blood   Result Value Ref Range    Glucose 116 (H) 65 - 99 mg/dL    BUN 10 8 - 23 mg/dL    Creatinine 0.51 (L) 0.57 - 1.00 mg/dL    Sodium 137 136 - 145 mmol/L    Potassium 4.8 3.5 - 5.2 mmol/L    Chloride 102 98 - 107 mmol/L    CO2 26.2 22.0 - 29.0 mmol/L    Calcium 8.6 8.6 - 10.5 mg/dL    eGFR Non African Amer 116 >60 mL/min/1.73    BUN/Creatinine Ratio 19.6 7.0 - 25.0    Anion Gap 8.8 5.0 - 15.0 mmol/L       IMPRESSION:  Patient is a 81 y.o. Not  or  female with left Intertrochanteric Hip Fracture    PLAN:   · Admited to: Geovanni Brar MD  · Diet: NPO Now  · Weight Bearing:Left Lower Extremity Non Weight Bearing until after surgery  · Labs: None additional needed  · Imaging: None additional needed  · Surgery: Intramedullary nailing for intertrochanteric hip fracture  · Hip  "Nail Consent: The risks and benefits of operative versus nonoperative treatment were discussed. They have elected to undergo operative treatment of the  injury. The general risks discussed included but were not limited to the risk of anesthesia, medical complications, infection, the need for further procedures, damage to neurovascular structures, blood clots, and continued pain.  No guarantees were made. Specifically, for this fracture I had a thorough discussion with the patient about the operative risks of intramedullary nailing.  These risks included nonunion/malunion, continued pain, advancement of arthritis and hardware irritation.  The patient and/or family wished to proceed with surgery.  The surgical consent was signed.  · Disposition: Fortunately for the patient, I had a surgery canceled this morning and was able to slide her into that open spot.  She is going to get surgery at 7 AM this morning for her left hip intramedullary nailing.    R \"Jayjay\" Jose Miguel DONNELLY MD  Orthopaedic Surgery  [facility]  [phone] - [place] Office  [phone] - [place] Office          "

## 2021-11-03 NOTE — PLAN OF CARE
Goal Outcome Evaluation:              Outcome Summary: L IM Nailing today, arrived to floor 1100, confused, VSS, RA, IVFs, voiding per bsc & incontinently-brief in place, MRI brain ordered-will need Valium prior to-screening sheet completed and called in, rehab at d/c, assist x1, CTM

## 2021-11-03 NOTE — ED NOTES
Patient placed in gown for surgery. Patient belongings, cell phone, and clothes in belonging bag and sent to surgery with her.     Nela Oquendo RN  11/03/21 0611

## 2021-11-03 NOTE — ED NOTES
Patient offered purewick or bedpan to help her urinate. Patient declines at this time. Offered to turn patient as she states she is uncomfortable and patient refuses at this time. Patient given warm blanket, call light and pillow.      Shirley Cabral, RN  11/03/21 0454

## 2021-11-03 NOTE — ANESTHESIA PREPROCEDURE EVALUATION
Anesthesia Evaluation     NPO Solid Status: > 8 hours  NPO Liquid Status: > 2 hours           Airway   Mallampati: I  TM distance: >3 FB  Neck ROM: full  Dental    (+) poor dentition        Pulmonary - normal exam   (+) asthma,recent URI resolved,   Cardiovascular - normal exam  Exercise tolerance: good (4-7 METS)    ECG reviewed    (+) hyperlipidemia,       Neuro/Psych  (+) dementia, poor historian.,     GI/Hepatic/Renal/Endo      Musculoskeletal     (+) neck pain,   Abdominal    Substance History      OB/GYN          Other   arthritis,          Phys Exam Other: Confused. Poor historian                 Anesthesia Plan    ASA 3     general     intravenous induction     Anesthetic plan, all risks, benefits, and alternatives have been provided, discussed and informed consent has been obtained with: patient.

## 2021-11-03 NOTE — ED PROVIDER NOTES
EMERGENCY DEPARTMENT ENCOUNTER    Room Number:  12/12  Date of encounter:  11/3/2021  PCP: Chang Borrego MD  Historian: Patient      HPI:  Chief Complaint: Fall, hip pain  A complete HPI/ROS/PMH/PSH/SH/FH are unobtainable due to: None    Context: Samantha Davila is a 81 y.o. female who presents to the ED c/o slip and fall this evening with resultant injury to left hip.  Patient denies any blow to head no loss of consciousness.  Complaining of no other injuries.  Denies any tingling or numbness.  No weakness.      PAST MEDICAL HISTORY  Active Ambulatory Problems     Diagnosis Date Noted   • Neurodermatitis 06/04/2016   • Insomnia 05/10/2017   • Viral upper respiratory tract infection 04/08/2018   • Closed fracture of neck of right femur (HCC) 03/02/2019   • Fall 03/03/2019   • Weight loss 06/18/2019   • Colitis, collagenous 02/14/2020   • Age-related osteoporosis without current pathological fracture 03/10/2020   • Hyperlipidemia 03/10/2020   • Neck pain 09/02/2021     Resolved Ambulatory Problems     Diagnosis Date Noted   • No Resolved Ambulatory Problems     Past Medical History:   Diagnosis Date   • Arthritis    • Asthma    • Colon polyp          PAST SURGICAL HISTORY  Past Surgical History:   Procedure Laterality Date   • BACK SURGERY     • ROTATOR CUFF REPAIR Left    • TONSILLECTOMY     • TOTAL HIP ARTHROPLASTY Right 3/4/2019    Procedure: TOTAL HIP ARTHROPLASTY ANTERIOR WITH HANA TABLE;  Surgeon: Michael Gillespie II, MD;  Location: [facility];  Service: Orthopedics         FAMILY HISTORY  Family History   Problem Relation Age of Onset   • Osteoporosis Mother    • Thyroid cancer Mother    • Lung cancer Father         HEART   • Breast cancer Paternal Grandmother          SOCIAL HISTORY  Social History     Socioeconomic History   • Marital status:    Tobacco Use   • Smoking status: Never Smoker   • Smokeless tobacco: Never Used   Vaping Use   • Vaping Use: Never used   Substance and Sexual  Activity   • Alcohol use: Yes   • Drug use: Defer   • Sexual activity: Defer         ALLERGIES  Quinine derivatives and Sulfa antibiotics        REVIEW OF SYSTEMS  Review of Systems     All systems reviewed and negative except for those discussed in HPI.       PHYSICAL EXAM    I have reviewed the triage vital signs and nursing notes.    ED Triage Vitals [11/03/21 0026]   Temp Heart Rate Resp BP SpO2   97.1 °F (36.2 °C) 78 18 142/78 96 %      Temp src Heart Rate Source Patient Position BP Location FiO2 (%)   Temporal Monitor Lying -- --       Physical Exam  GENERAL: Mild distressed  HENT: nares patent  EYES: no scleral icterus  CV: regular rhythm, regular rate  RESPIRATORY: normal effort  ABDOMEN: soft  MUSCULOSKELETAL: Patient tender to palpation over left lateral hip hip is her held externally rotated neurovascularly intact distally no significant distal pain  NEURO: alert, moves all extremities, follows commands  SKIN: warm, dry        LAB RESULTS  Recent Results (from the past 24 hour(s))   Comprehensive Metabolic Panel    Collection Time: 11/03/21 12:58 AM    Specimen: Blood   Result Value Ref Range    Glucose 124 (H) 65 - 99 mg/dL    BUN 9 8 - 23 mg/dL    Creatinine 0.59 0.57 - 1.00 mg/dL    Sodium 138 136 - 145 mmol/L    Potassium 3.7 3.5 - 5.2 mmol/L    Chloride 100 98 - 107 mmol/L    CO2 27.5 22.0 - 29.0 mmol/L    Calcium 9.1 8.6 - 10.5 mg/dL    Total Protein 6.7 6.0 - 8.5 g/dL    Albumin 4.50 3.50 - 5.20 g/dL    ALT (SGPT) 18 1 - 33 U/L    AST (SGOT) 20 1 - 32 U/L    Alkaline Phosphatase 67 39 - 117 U/L    Total Bilirubin 0.3 0.0 - 1.2 mg/dL    eGFR Non African Amer 98 >60 mL/min/1.73    Globulin 2.2 gm/dL    A/G Ratio 2.0 g/dL    BUN/Creatinine Ratio 15.3 7.0 - 25.0    Anion Gap 10.5 5.0 - 15.0 mmol/L   Protime-INR    Collection Time: 11/03/21 12:58 AM    Specimen: Blood   Result Value Ref Range    Protime 13.1 11.7 - 14.2 Seconds    INR 1.01 0.90 - 1.10   aPTT    Collection Time: 11/03/21 12:58 AM     Specimen: Blood   Result Value Ref Range    PTT 25.5 22.7 - 35.4 seconds   Troponin    Collection Time: 11/03/21 12:58 AM    Specimen: Blood   Result Value Ref Range    Troponin T <0.010 0.000 - 0.030 ng/mL   CBC Auto Differential    Collection Time: 11/03/21 12:58 AM    Specimen: Blood   Result Value Ref Range    WBC 7.55 3.40 - 10.80 10*3/mm3    RBC 4.52 3.77 - 5.28 10*6/mm3    Hemoglobin 14.0 12.0 - 15.9 g/dL    Hematocrit 41.7 34.0 - 46.6 %    MCV 92.3 79.0 - 97.0 fL    MCH 31.0 26.6 - 33.0 pg    MCHC 33.6 31.5 - 35.7 g/dL    RDW 12.3 12.3 - 15.4 %    RDW-SD 41.6 37.0 - 54.0 fl    MPV 9.5 6.0 - 12.0 fL    Platelets 320 140 - 450 10*3/mm3    Neutrophil % 58.7 42.7 - 76.0 %    Lymphocyte % 30.7 19.6 - 45.3 %    Monocyte % 7.9 5.0 - 12.0 %    Eosinophil % 1.6 0.3 - 6.2 %    Basophil % 0.8 0.0 - 1.5 %    Immature Grans % 0.3 0.0 - 0.5 %    Neutrophils, Absolute 4.43 1.70 - 7.00 10*3/mm3    Lymphocytes, Absolute 2.32 0.70 - 3.10 10*3/mm3    Monocytes, Absolute 0.60 0.10 - 0.90 10*3/mm3    Eosinophils, Absolute 0.12 0.00 - 0.40 10*3/mm3    Basophils, Absolute 0.06 0.00 - 0.20 10*3/mm3    Immature Grans, Absolute 0.02 0.00 - 0.05 10*3/mm3    nRBC 0.0 0.0 - 0.2 /100 WBC   ECG 12 Lead    Collection Time: 11/03/21  1:36 AM   Result Value Ref Range    QT Interval 376 ms       Ordered the above labs and independently reviewed the results.        RADIOLOGY  XR Chest 1 View    Result Date: 11/3/2021  SINGLE VIEW OF THE CHEST  HISTORY: Shortness of air  COMPARISON: 03/02/2019  FINDINGS: Cardiomegaly is present. There is tortuosity and ectasia of the thoracic aorta. This is stable finding when compared to prior study. No pneumothorax, pleural effusion, or acute infiltrate is seen.      No acute findings.  This report was finalized on 11/3/2021 1:42 AM by Dr. Padmini Rosenbaum M.D.      XR Hip With or Without Pelvis 2 - 3 View Left    Result Date: 11/3/2021  AP VIEW THE PELVIS +2 VIEWS LEFT HIP  HISTORY: Fall  COMPARISON:  03/04/2019  FINDINGS: The patient has an intertrochanteric fracture of the left femur. No additional acute fractures are seen. Patient is status post right hip arthroplasty. Hardware appears stable in position when compared to the immediate postoperative exam.      Intertrochanteric fracture of the left femur.  This report was finalized on 11/3/2021 1:46 AM by Dr. Padmini Rosenbaum M.D.        I ordered the above noted radiological studies. Reviewed by me and discussed with radiologist.  See dictation for official radiology interpretation.      PROCEDURES    Procedures      MEDICATIONS GIVEN IN ER    Medications   sodium chloride 0.9 % flush 10 mL (has no administration in time range)   HYDROmorphone (DILAUDID) injection 0.5 mg (has no administration in time range)   sodium chloride 0.9 % flush 10 mL (has no administration in time range)   sodium chloride 0.9 % flush 10 mL (has no administration in time range)   acetaminophen (TYLENOL) tablet 650 mg (has no administration in time range)     Or   acetaminophen (TYLENOL) 160 MG/5ML solution 650 mg (has no administration in time range)     Or   acetaminophen (TYLENOL) suppository 650 mg (has no administration in time range)   ondansetron (ZOFRAN) tablet 4 mg (has no administration in time range)     Or   ondansetron (ZOFRAN) injection 4 mg (has no administration in time range)   calcium carbonate (TUMS) chewable tablet 500 mg (200 mg elemental) (has no administration in time range)   morphine injection 2 mg (has no administration in time range)   HYDROcodone-acetaminophen (NORCO) 5-325 MG per tablet 1 tablet (has no administration in time range)   morphine injection 4 mg (4 mg Intravenous Given 11/3/21 0059)   ondansetron (ZOFRAN) injection 4 mg (4 mg Intravenous Given 11/3/21 0059)         PROGRESS, DATA ANALYSIS, CONSULTS, AND MEDICAL DECISION MAKING    All labs have been independently reviewed by me.  All radiology studies have been reviewed by me and discussed with  radiologist dictating the report.   EKG's independently viewed and interpreted by me.  Discussion below represents my analysis of pertinent findings related to patient's condition, differential diagnosis, treatment plan and final disposition.        ED Course as of 11/03/21 0206 Wed Nov 03, 2021   0140 EKG          EKG time: 0136  Rhythm/Rate: Sinus rhythm rate 85  P waves and OK: Normal  QRS, axis: Normal  ST and T waves: Unspecific    Interpreted Contemporaneously by me, independently viewed [TJ]   0205 Discussed with Dr. Gillespie with orthopedics.  Happy to consult on the patient. [TJ]      ED Course User Index  [TJ] Sebastian Yanez MD           PPE: The patient wore a surgical mask throughout the entire patient encounter. I wore an N95.    AS OF 02:06 EDT VITALS:    BP - 142/78  HR - 78  TEMP - 97.1 °F (36.2 °C) (Temporal)  O2 SATS - 96%        DIAGNOSIS  Final diagnoses:   Closed nondisplaced intertrochanteric fracture of left femur, initial encounter (HCC)   Fall, initial encounter         DISPOSITION  Admit           Sebastian Yanez MD  11/03/21 0206

## 2021-11-03 NOTE — PROGRESS NOTES
Continued Stay Note  [facility]     Patient Name: Samantha Davila  MRN: [id number]  Today's Date: 11/3/2021    Admit Date: 11/3/2021     Discharge Plan     Row Name 11/03/21 1315       Plan    Plan Assessing for DCP    Plan Comments Call recieved from PACU RN Addie who stated that she had spoken to pt's son Ulisses Davila via phone [phone] who [place] who was requesting call from [facility].  Call placed nd HIPPA compliant  left for pt's son Ulisses  to return call to  CCP to discuss pt's DCP.  CCP did attempt to screen pt who was asleep and no family in pt's room.  CCP will follow up to assist with pt's DCP.               Discharge Codes    No documentation.                     KIMBERLEE Lopez

## 2021-11-03 NOTE — ED NOTES
I wore full protective equipment throughout this patient encounter including a face mask, goggles, and gloves. Hand hygiene was performed before donning protective equipment and after removal when leaving the room.       Shirley Cabral RN  11/03/21 0040

## 2021-11-03 NOTE — H&P
Patient Name:  Samantha Davila  YOB: 1940  MRN:  [id number]  Admit Date:  11/3/2021  Patient Care Team:  Chang Borrego MD as PCP - General (Internal Medicine)      Subjective   History Present Illness     Chief Complaint   Patient presents with   • Fall   • Hip Injury     History of Present Illness   Ms. Davila is a 81 y.o. non-smoker with a history of collagenous colitis, neurodermatitis, HLD and progressive confusion that presents to [facility] complaining of a fall and hip pain. The patient is a somewhat poor historian, but her  and niece are at bedside. The patient remembers falling, but cannot articulate why or how she fell. Her  reports they were getting ready for bed and the patient had just taken her Ambien a few minutes prior. She was walking back from the bathroom when she just fell. The patient's  denies any signs of the patient tripping or having any presyncope. She landed on her left hip and they were unable to ambulate her so he called 911. He states she did not hit her head or lose any consciousness with the fall.   In the ED, she was hemodynamically stable and lab work unremarkable. X-ray of her hip showed an intertrochanteric fracture of the left femur. CXR was negative for acute findings. She was seen in consultation and taken to the OR with Dr. Gillespie this morning. I am seeing her for the first time post-operatively.    Of note, the patient's family reports a progressive decline in the patient's mental status. She is becoming more forgetful at home and often doing off-the-wall things like putting dirty towels back in the cabinet for example. She is already on Namenda and scheduled to see Dr. Wang with Neurology on 11/8/21. She apparently has severe claustrophobia and needs an MRI brain outpatient as well. Family requesting neurology see this admission given her likely rehab stay and difficulty making that appointment.    Review of Systems    Constitutional: Negative for activity change, appetite change, chills and fever.   HENT: Negative for congestion and ear pain.    Eyes: Negative for discharge and redness.   Respiratory: Negative for cough and shortness of breath.    Cardiovascular: Negative for chest pain and leg swelling.   Gastrointestinal: Positive for diarrhea (chronic d/t collagenous colitis). Negative for abdominal distention and abdominal pain.   Genitourinary: Negative for difficulty urinating and dysuria.   Musculoskeletal: Positive for arthralgias and gait problem.   Skin: Negative for color change and pallor.   Neurological: Negative for dizziness and light-headedness.   Psychiatric/Behavioral: Positive for confusion. The patient is not nervous/anxious.       Personal History     Past Medical History:   Diagnosis Date   • Arthritis    • Asthma    • Colitis, collagenous    • Colon polyp    • Insomnia      Past Surgical History:   Procedure Laterality Date   • BACK SURGERY     • ROTATOR CUFF REPAIR Left    • TONSILLECTOMY     • TOTAL HIP ARTHROPLASTY Right 3/4/2019    Procedure: TOTAL HIP ARTHROPLASTY ANTERIOR WITH HANA TABLE;  Surgeon: Michael Gillespie II, MD;  Location: [facility];  Service: Orthopedics     Family History   Problem Relation Age of Onset   • Osteoporosis Mother    • Thyroid cancer Mother    • Lung cancer Father         HEART   • Breast cancer Paternal Grandmother      Social History     Tobacco Use   • Smoking status: Never Smoker   • Smokeless tobacco: Never Used   Vaping Use   • Vaping Use: Never used   Substance Use Topics   • Alcohol use: Yes   • Drug use: Defer     Medications Prior to Admission   Medication Sig Dispense Refill Last Dose   • Cetirizine-Pseudoephedrine (WAL-ZYR D PO) TAKE 1 TABLET BY MOUTH TWICE DAILY   11/2/2021 at Unknown time   • Cholecalciferol (VITAMIN D3) 2000 units tablet Take  by mouth.   11/2/2021 at Unknown time   • Loperamide HCl (IMODIUM PO) Take  by mouth.   11/2/2021 at  Unknown time   • memantine (NAMENDA) 5 MG tablet Take 1 tablet by mouth Daily. 30 tablet 0 11/2/2021 at Unknown time   • Multiple Vitamins-Minerals (WOMENS 50+ MULTI VITAMIN/MIN PO) Take  by mouth.   11/2/2021 at Unknown time   • Probiotic Product (PROBIOTIC DAILY PO) Take  by mouth.   11/2/2021 at Unknown time   • zolpidem (AMBIEN) 5 MG tablet TAKE 1 TABLET BY MOUTH AT NIGHT AS NEEDED FOR SLEEP 30 tablet 5 11/2/2021 at Unknown time   • Budesonide (ENTOCORT EC) 3 MG 24 hr capsule TAKE 3 CS PO QD  12      Allergies:    Allergies   Allergen Reactions   • Quinine Derivatives    • Sulfa Antibiotics        Objective    Objective     Vital Signs  Temp:  [97.1 °F (36.2 °C)-98.2 °F (36.8 °C)] 98.1 °F (36.7 °C)  Heart Rate:  [63-99] 80  Resp:  [14-18] 14  BP: (109-149)/(70-97) 127/76  SpO2:  [91 %-100 %] 100 %  on  Flow (L/min):  [2] 2;   Device (Oxygen Therapy): nasal cannula  Body mass index is 19.17 kg/m².    Physical Exam  Vitals and nursing note reviewed.   Constitutional:       Appearance: She is ill-appearing. She is not toxic-appearing.   HENT:      Head: Normocephalic and atraumatic.      Right Ear: External ear normal.      Left Ear: External ear normal.      Nose: Nose normal.   Eyes:      General:         Right eye: No discharge.         Left eye: No discharge.      Conjunctiva/sclera: Conjunctivae normal.   Cardiovascular:      Rate and Rhythm: Normal rate and regular rhythm.      Pulses: Normal pulses.      Heart sounds: Normal heart sounds.   Pulmonary:      Effort: Pulmonary effort is normal. No respiratory distress.      Breath sounds: Normal breath sounds.   Abdominal:      General: Bowel sounds are normal. There is distension.      Palpations: Abdomen is soft.      Tenderness: There is no abdominal tenderness.   Musculoskeletal:         General: Swelling and tenderness present. Normal range of motion.      Cervical back: Normal range of motion and neck supple.      Comments: Left hip dressings intact.  Moderate edema in LLE with mild in RLE.   Skin:     General: Skin is warm and dry.      Coloration: Skin is pale.   Neurological:      Mental Status: She is alert and oriented to person, place, and time.      Sensory: No sensory deficit.      Motor: Weakness present.      Coordination: Coordination normal.   Psychiatric:         Mood and Affect: Mood normal.         Behavior: Behavior normal.        Results Review:  I reviewed the patient's new clinical results.  I reviewed the patient's new imaging results and agree with the interpretation.  I reviewed the patient's other test results and agree with the interpretation  I personally viewed and interpreted the patient's EKG/Telemetry data    Lab Results (last 24 hours)     Procedure Component Value Units Date/Time    CBC & Differential [[id number]]  (Normal) Collected: 11/03/21 0058    Specimen: Blood Updated: 11/03/21 0119    Narrative:      The following orders were created for panel order CBC & Differential.  Procedure                               Abnormality         Status                     ---------                               -----------         ------                     CBC Auto Differential[[id number]]        Normal              Final result                 Please view results for these tests on the individual orders.    Comprehensive Metabolic Panel [[id number]]  (Abnormal) Collected: 11/03/21 0058    Specimen: Blood Updated: 11/03/21 0128     Glucose 124 mg/dL      BUN 9 mg/dL      Creatinine 0.59 mg/dL      Sodium 138 mmol/L      Potassium 3.7 mmol/L      Chloride 100 mmol/L      CO2 27.5 mmol/L      Calcium 9.1 mg/dL      Total Protein 6.7 g/dL      Albumin 4.50 g/dL      ALT (SGPT) 18 U/L      AST (SGOT) 20 U/L      Alkaline Phosphatase 67 U/L      Total Bilirubin 0.3 mg/dL      eGFR Non African Amer 98 mL/min/1.73      Globulin 2.2 gm/dL      A/G Ratio 2.0 g/dL      BUN/Creatinine Ratio 15.3     Anion Gap 10.5 mmol/L     Narrative:      GFR Normal  >60  Chronic Kidney Disease <60  Kidney Failure <15      Protime-INR [[id number]]  (Normal) Collected: 11/03/21 0058    Specimen: Blood Updated: 11/03/21 0133     Protime 13.1 Seconds      INR 1.01    aPTT [[id number]]  (Normal) Collected: 11/03/21 0058    Specimen: Blood Updated: 11/03/21 0136     PTT 25.5 seconds     Troponin [[id number]]  (Normal) Collected: 11/03/21 0058    Specimen: Blood Updated: 11/03/21 0147     Troponin T <0.010 ng/mL     Narrative:      Troponin T Reference Range:  <= 0.03 ng/mL-   Negative for AMI  >0.03 ng/mL-     Abnormal for myocardial necrosis.  Clinicians would have to utilize clinical acumen, EKG, Troponin and serial changes to determine if it is an Acute Myocardial Infarction or myocardial injury due to an underlying chronic condition.       Results may be falsely decreased if patient taking Biotin.      CBC Auto Differential [[id number]]  (Normal) Collected: 11/03/21 0058    Specimen: Blood Updated: 11/03/21 0119     WBC 7.55 10*3/mm3      RBC 4.52 10*6/mm3      Hemoglobin 14.0 g/dL      Hematocrit 41.7 %      MCV 92.3 fL      MCH 31.0 pg      MCHC 33.6 g/dL      RDW 12.3 %      RDW-SD 41.6 fl      MPV 9.5 fL      Platelets 320 10*3/mm3      Neutrophil % 58.7 %      Lymphocyte % 30.7 %      Monocyte % 7.9 %      Eosinophil % 1.6 %      Basophil % 0.8 %      Immature Grans % 0.3 %      Neutrophils, Absolute 4.43 10*3/mm3      Lymphocytes, Absolute 2.32 10*3/mm3      Monocytes, Absolute 0.60 10*3/mm3      Eosinophils, Absolute 0.12 10*3/mm3      Basophils, Absolute 0.06 10*3/mm3      Immature Grans, Absolute 0.02 10*3/mm3      nRBC 0.0 /100 WBC     Troponin [[id number]]  (Normal) Collected: 11/03/21 0226    Specimen: Blood Updated: 11/03/21 0309     Troponin T <0.010 ng/mL     Narrative:      Troponin T Reference Range:  <= 0.03 ng/mL-   Negative for AMI  >0.03 ng/mL-     Abnormal for myocardial necrosis.  Clinicians would have to utilize clinical acumen, EKG, Troponin and serial changes  to determine if it is an Acute Myocardial Infarction or myocardial injury due to an underlying chronic condition.       Results may be falsely decreased if patient taking Biotin.      COVID PRE-OP / PRE-PROCEDURE SCREENING ORDER (NO ISOLATION) - Swab, Nasopharynx [[id number]]  (Normal) Collected: 11/03/21 0227    Specimen: Swab from Nasopharynx Updated: 11/03/21 0255    Narrative:      The following orders were created for panel order COVID PRE-OP / PRE-PROCEDURE SCREENING ORDER (NO ISOLATION) - Swab, Nasopharynx.  Procedure                               Abnormality         Status                     ---------                               -----------         ------                     COVID-19,[facility] IN-HOUSE...[[id number]]  Normal              Final result                 Please view results for these tests on the individual orders.    COVID-19,[facility] IN-HOUSE CEPHEID/LACEY NP SWAB IN TRANSPORT MEDIA 8-12 HR TAT - Swab, Nasopharynx [[id number]]  (Normal) Collected: 11/03/21 0227    Specimen: Swab from Nasopharynx Updated: 11/03/21 0255     COVID19 Not Detected    Narrative:      Fact sheet for providers: https://www.fda.gov/media/513239/download    Fact sheet for patients: https://www.fda.gov/media/664834/download    Test performed by PCR.    CBC (No Diff) [[id number]]  (Abnormal) Collected: 11/03/21 0604    Specimen: Blood Updated: 11/03/21 0659     WBC 12.88 10*3/mm3      RBC 4.17 10*6/mm3      Hemoglobin 13.0 g/dL      Hematocrit 38.5 %      MCV 92.3 fL      MCH 31.2 pg      MCHC 33.8 g/dL      RDW 12.4 %      RDW-SD 42.0 fl      MPV 9.5 fL      Platelets 335 10*3/mm3     Basic Metabolic Panel [[id number]]  (Abnormal) Collected: 11/03/21 0604    Specimen: Blood Updated: 11/03/21 0639     Glucose 116 mg/dL      BUN 10 mg/dL      Creatinine 0.51 mg/dL      Sodium 137 mmol/L      Potassium 4.8 mmol/L      Comment: Slight hemolysis detected by analyzer. Results may be affected.        Chloride 102 mmol/L      CO2 26.2  mmol/L      Calcium 8.6 mg/dL      eGFR Non African Amer 116 mL/min/1.73      BUN/Creatinine Ratio 19.6     Anion Gap 8.8 mmol/L     Narrative:      GFR Normal >60  Chronic Kidney Disease <60  Kidney Failure <15            Imaging Results (Last 24 Hours)     Procedure Component Value Units Date/Time    XR Hip With or Without Pelvis 1 View Left [[id number]] Collected: 11/03/21 0912     Updated: 11/03/21 0915    Narrative:      XR HIP W OR WO PELVIS 1 VIEW LEFT-     POSTOP PORTABLE SINGLE VIEW LEFT HIP     CLINICAL INFORMATION: Post arthroplasty     FINDINGS: Hardware satisfactory in position. A complicating process is  not identified.     This report was finalized on 11/3/2021 9:12 AM by Dr. Enrique Ruby M.D.       XR Hip With or Without Pelvis 2 - 3 View Left [[id number]] Collected: 11/03/21 0842     Updated: 11/03/21 0846    Narrative:      XR HIP W OR WO PELVIS 2-3 VIEW LEFT-     Clinical: Fracture fixation     Fluoroscopy time 28 seconds     3 C-arm images submitted from the operative suite     FINDINGS: Intraoperative C-arm images of the left hip taken for the  purpose of visualization and localization during medullary augusto and nail  placement. Near anatomic alignment at the fracture site.     This report was finalized on 11/3/2021 8:43 AM by Dr. Enrique Ruby M.D.       FL C Arm During Surgery [[id number]] Resulted: 11/03/21 0756     Updated: 11/03/21 0756    Narrative:      This procedure was auto-finalized with no dictation required.    XR Hip With or Without Pelvis 2 - 3 View Left [[id number]] Collected: 11/03/21 0144     Updated: 11/03/21 0149    Narrative:      AP VIEW THE PELVIS +2 VIEWS LEFT HIP     HISTORY: Fall     COMPARISON: 03/04/2019     FINDINGS:  The patient has an intertrochanteric fracture of the left femur. No  additional acute fractures are seen. Patient is status post right hip  arthroplasty. Hardware appears stable in position when compared to the  immediate postoperative exam.        Impression:      Intertrochanteric fracture of the left femur.     This report was finalized on 11/3/2021 1:46 AM by Dr. Padmini Rosenbaum M.D.       XR Chest 1 View [[id number]] Collected: 11/03/21 0141     Updated: 11/03/21 0145    Narrative:      SINGLE VIEW OF THE CHEST     HISTORY: Shortness of air     COMPARISON: 03/02/2019     FINDINGS:  Cardiomegaly is present. There is tortuosity and ectasia of the thoracic  aorta. This is stable finding when compared to prior study. No  pneumothorax, pleural effusion, or acute infiltrate is seen.       Impression:      No acute findings.     This report was finalized on 11/3/2021 1:42 AM by Dr. Padmini Rosenbaum M.D.                 ECG 12 Lead   Preliminary Result   HEART RATE= 85  bpm   RR Interval= 708  ms   AK Interval= 129  ms   P Horizontal Axis= 22  deg   P Front Axis= 65  deg   QRSD Interval= 98  ms   QT Interval= 376  ms   QRS Axis= 31  deg   T Wave Axis= 52  deg   - OTHERWISE NORMAL ECG -   Sinus rhythm   Minimal ST elevation, anterior leads   Electronically Signed By:    Date and Time of Study: 2021-11-03 01:36:26           Assessment/Plan     Active Hospital Problems    Diagnosis  POA   • **Closed nondisplaced intertrochanteric fracture of left femur (HCC) [S72.145A]  Yes   • Episodic confusion [R41.0]  Yes   • Hyperlipidemia [E78.5]  Yes   • Colitis, collagenous [K52.831]  Yes   • Fall [W19.XXXA]  Yes   • Neurodermatitis [L28.0]  Yes     Mrs. Davila is a 81 year old female who presented to the hospital after a fall at home. She was found to have a left intertrochanteric hip fracture and is now s/p left IM nailing with Dr. Gillespie.     · Closed nondisplaced intertrochanteric fracture of left femur: S/P left IM nailing 11/3/21. Doing well post-operatively. Pain medication as needed and  daily has been added for VTE. PT consulted. She will need rehab. CCP to assist. Encouraged IS.  · Episodic confusion: Patient most definitely has some underlying dementia  at play. She was scheduled to see Neurology on 11/8 with plans for outpatient MRI. Will consult Neurology per family request as I do not think this is unreasonable given her progressive decline and fall. We could do MRI inpatient with pre-medication, but would like to avoid in the immediate post op day 1. Will await neurology input and could consider MRI tomorrow. Will continue Namenda and monitor closely for delirium as she would be high risk.   · Colitis, collangenous: Stable. Will continue budesonide.  · Fall: As above. Suspect mechanical given lack of prodrome or LOC. Monitor.    · I discussed the patients findings and my recommendations with patient, family and nursing staff.    VTE Prophylaxis - SCDs.  Code Status - No CPR or intubation. Discussed with patient's  and niece at bedside.       JUNE Morales  [facility]  11/03/21  12:38 EDT

## 2021-11-03 NOTE — ANESTHESIA PROCEDURE NOTES
Airway  Urgency: elective    Date/Time: 11/3/2021 7:13 AM  Airway not difficult    General Information and Staff    Patient location during procedure: OR  Anesthesiologist: Sparkle Nobles MD    Indications and Patient Condition  Indications for airway management: airway protection    Preoxygenated: yes  MILS maintained throughout  Mask difficulty assessment: 1 - vent by mask    Final Airway Details  Final airway type: endotracheal airway      Successful airway: ETT  Cuffed: yes   Successful intubation technique: direct laryngoscopy  Facilitating devices/methods: intubating stylet  Endotracheal tube insertion site: oral  Blade: Evie  Blade size: 3  ETT size (mm): 7.0  Cormack-Lehane Classification: grade I - full view of glottis  Placement verified by: chest auscultation and capnometry   Measured from: teeth  Number of attempts at approach: 1  Assessment: lips, teeth, and gum same as pre-op and atraumatic intubation

## 2021-11-04 ENCOUNTER — APPOINTMENT (OUTPATIENT)
Dept: MRI IMAGING | Facility: HOSPITAL | Age: 81
End: 2021-11-04

## 2021-11-04 LAB
ALBUMIN SERPL-MCNC: 4.2 G/DL (ref 3.5–5.2)
ALBUMIN/GLOB SERPL: 2.1 G/DL
ALP SERPL-CCNC: 61 U/L (ref 39–117)
ALT SERPL W P-5'-P-CCNC: 18 U/L (ref 1–33)
ANION GAP SERPL CALCULATED.3IONS-SCNC: 7.2 MMOL/L (ref 5–15)
AST SERPL-CCNC: 27 U/L (ref 1–32)
BILIRUB SERPL-MCNC: 0.6 MG/DL (ref 0–1.2)
BUN SERPL-MCNC: 8 MG/DL (ref 8–23)
BUN/CREAT SERPL: 13.6 (ref 7–25)
CALCIUM SPEC-SCNC: 8.8 MG/DL (ref 8.6–10.5)
CHLORIDE SERPL-SCNC: 100 MMOL/L (ref 98–107)
CO2 SERPL-SCNC: 29.8 MMOL/L (ref 22–29)
CREAT SERPL-MCNC: 0.59 MG/DL (ref 0.57–1)
DEPRECATED RDW RBC AUTO: 39.3 FL (ref 37–54)
ERYTHROCYTE [DISTWIDTH] IN BLOOD BY AUTOMATED COUNT: 12.1 % (ref 12.3–15.4)
FOLATE SERPL-MCNC: 15 NG/ML (ref 4.78–24.2)
GFR SERPL CREATININE-BSD FRML MDRD: 98 ML/MIN/1.73
GLOBULIN UR ELPH-MCNC: 2 GM/DL
GLUCOSE SERPL-MCNC: 118 MG/DL (ref 65–99)
HCT VFR BLD AUTO: 36.3 % (ref 34–46.6)
HGB BLD-MCNC: 12.6 G/DL (ref 12–15.9)
MCH RBC QN AUTO: 31 PG (ref 26.6–33)
MCHC RBC AUTO-ENTMCNC: 34.7 G/DL (ref 31.5–35.7)
MCV RBC AUTO: 89.4 FL (ref 79–97)
PLATELET # BLD AUTO: 309 10*3/MM3 (ref 140–450)
PMV BLD AUTO: 10 FL (ref 6–12)
POTASSIUM SERPL-SCNC: 4 MMOL/L (ref 3.5–5.2)
PROT SERPL-MCNC: 6.2 G/DL (ref 6–8.5)
RBC # BLD AUTO: 4.06 10*6/MM3 (ref 3.77–5.28)
SODIUM SERPL-SCNC: 137 MMOL/L (ref 136–145)
TSH SERPL DL<=0.05 MIU/L-ACNC: 0.4 UIU/ML (ref 0.27–4.2)
WBC # BLD AUTO: 11.68 10*3/MM3 (ref 3.4–10.8)

## 2021-11-04 PROCEDURE — 84443 ASSAY THYROID STIM HORMONE: CPT | Performed by: PSYCHIATRY & NEUROLOGY

## 2021-11-04 PROCEDURE — 99231 SBSQ HOSP IP/OBS SF/LOW 25: CPT | Performed by: PSYCHIATRY & NEUROLOGY

## 2021-11-04 PROCEDURE — 70553 MRI BRAIN STEM W/O & W/DYE: CPT

## 2021-11-04 PROCEDURE — 0 GADOBENATE DIMEGLUMINE 529 MG/ML SOLUTION: Performed by: INTERNAL MEDICINE

## 2021-11-04 PROCEDURE — 25010000002 DIAZEPAM PER 5 MG: Performed by: PSYCHIATRY & NEUROLOGY

## 2021-11-04 PROCEDURE — A9577 INJ MULTIHANCE: HCPCS | Performed by: INTERNAL MEDICINE

## 2021-11-04 PROCEDURE — 97162 PT EVAL MOD COMPLEX 30 MIN: CPT

## 2021-11-04 PROCEDURE — 97110 THERAPEUTIC EXERCISES: CPT

## 2021-11-04 PROCEDURE — 82746 ASSAY OF FOLIC ACID SERUM: CPT | Performed by: PSYCHIATRY & NEUROLOGY

## 2021-11-04 PROCEDURE — 85027 COMPLETE CBC AUTOMATED: CPT | Performed by: NURSE PRACTITIONER

## 2021-11-04 PROCEDURE — 80053 COMPREHEN METABOLIC PANEL: CPT | Performed by: NURSE PRACTITIONER

## 2021-11-04 PROCEDURE — 0 CEFAZOLIN IN DEXTROSE 2-4 GM/100ML-% SOLUTION: Performed by: ORTHOPAEDIC SURGERY

## 2021-11-04 RX ORDER — ESCITALOPRAM OXALATE 5 MG/1
5 TABLET ORAL NIGHTLY
Status: DISCONTINUED | OUTPATIENT
Start: 2021-11-04 | End: 2021-11-07 | Stop reason: HOSPADM

## 2021-11-04 RX ORDER — OLANZAPINE 5 MG/1
5 TABLET ORAL NIGHTLY
Status: DISCONTINUED | OUTPATIENT
Start: 2021-11-04 | End: 2021-11-04

## 2021-11-04 RX ADMIN — GADOBENATE DIMEGLUMINE 11 ML: 529 INJECTION, SOLUTION INTRAVENOUS at 08:23

## 2021-11-04 RX ADMIN — CEFAZOLIN SODIUM 2 G: 2 INJECTION, SOLUTION INTRAVENOUS at 09:04

## 2021-11-04 RX ADMIN — SODIUM CHLORIDE, PRESERVATIVE FREE 10 ML: 5 INJECTION INTRAVENOUS at 21:26

## 2021-11-04 RX ADMIN — BUDESONIDE 9 MG: 3 CAPSULE, GELATIN COATED ORAL at 09:04

## 2021-11-04 RX ADMIN — ESCITALOPRAM 5 MG: 5 TABLET, FILM COATED ORAL at 21:26

## 2021-11-04 RX ADMIN — HYDROCODONE BITARTRATE AND ACETAMINOPHEN 1 TABLET: 5; 325 TABLET ORAL at 15:39

## 2021-11-04 RX ADMIN — SODIUM CHLORIDE, PRESERVATIVE FREE 10 ML: 5 INJECTION INTRAVENOUS at 09:05

## 2021-11-04 RX ADMIN — MEMANTINE HYDROCHLORIDE 5 MG: 5 TABLET, FILM COATED ORAL at 09:04

## 2021-11-04 RX ADMIN — HYDROCODONE BITARTRATE AND ACETAMINOPHEN 1 TABLET: 5; 325 TABLET ORAL at 21:26

## 2021-11-04 RX ADMIN — DIAZEPAM 5 MG: 5 INJECTION, SOLUTION INTRAMUSCULAR; INTRAVENOUS at 06:58

## 2021-11-04 RX ADMIN — ACETAMINOPHEN 650 MG: 325 TABLET, FILM COATED ORAL at 09:04

## 2021-11-04 RX ADMIN — Medication 1 CAPSULE: at 09:04

## 2021-11-04 RX ADMIN — ASPIRIN 325 MG: 325 TABLET ORAL at 09:04

## 2021-11-04 NOTE — THERAPY EVALUATION
Patient Name: Samantha Davila  : 1940    MRN: [id number]                              Today's Date: 2021       Admit Date: 11/3/2021    Visit Dx:     ICD-10-CM ICD-9-CM   1. Closed nondisplaced intertrochanteric fracture of left femur, initial encounter ([facility])  S72.145A 820.21   2. Fall, initial encounter  W19.XXXA E888.9     Patient Active Problem List   Diagnosis   • Neurodermatitis   • Insomnia   • Viral upper respiratory tract infection   • Closed fracture of neck of right femur ([facility])   • Fall   • Weight loss   • Colitis, collagenous   • Age-related osteoporosis without current pathological fracture   • Hyperlipidemia   • Neck pain   • Closed nondisplaced intertrochanteric fracture of left femur ([facility])   • Episodic confusion     Past Medical History:   Diagnosis Date   • Arthritis    • Asthma    • Colitis, collagenous    • Colon polyp    • Insomnia      Past Surgical History:   Procedure Laterality Date   • BACK SURGERY     • FEMUR IM NAILING/RODDING Left 11/3/2021    Procedure: FEMUR INTRAMEDULLARY NAILING/RODDING;  Surgeon: Michael Gillespie II, MD;  Location: [facility];  Service: Orthopedics;  Laterality: Left;  [place] Table   • ROTATOR CUFF REPAIR Left    • TONSILLECTOMY     • TOTAL HIP ARTHROPLASTY Right 3/4/2019    Procedure: TOTAL HIP ARTHROPLASTY ANTERIOR WITH HANA TABLE;  Surgeon: Michael Gillespie II, MD;  Location: [facility];  Service: Orthopedics      General Information     Row Name 21 1330          Physical Therapy Time and Intention    Document Type evaluation  -EJ     Mode of Treatment physical therapy  -EJ     Row Name 21 1805          General Information    Patient Profile Reviewed yes  -EJ     Prior Level of Function min assist:; independent:; ADL's; community mobility  unsure of PLOF, pt confused, no family present  -EJ     Existing Precautions/Restrictions fall  -EJ     Barriers to Rehab cognitive status  -EJ     Row Name 21 5013           Living Environment    Lives With spouse  -     Row Name 11/04/21 1331          Cognition    Orientation Status (Cognition) oriented to; person; verbal cues/prompts needed for orientation  -     Row Name 11/04/21 1331          Safety Issues, Functional Mobility    Impairments Affecting Function (Mobility) balance; cognition; strength; endurance/activity tolerance; pain; range of motion (ROM); postural/trunk control  -           User Key  (r) = Recorded By, (t) = Taken By, (c) = Cosigned By    Initials Name Provider Type     Allison Oswald, PT Physical Therapist               Mobility     Row Name 11/04/21 1333          Bed Mobility    Comment (Bed Mobility) up in chair  -     Row Name 11/04/21 1333          Sit-Stand Transfer    Sit-Stand [place] (Transfers) verbal cues; minimum assist (75% patient effort); 2 person assist  -     Assistive Device (Sit-Stand Transfers) walker, front-wheeled  -     Row Name 11/04/21 1333          Gait/Stairs (Locomotion)    [place] Level (Gait) verbal cues; contact guard; minimum assist (75% patient effort)  -EJ     Assistive Device (Gait) walker, front-wheeled  -EJ     Distance in Feet (Gait) 35  -EJ     Deviations/Abnormal Patterns (Gait) khoa decreased; antalgic; stride length decreased  -EJ     Bilateral Gait Deviations forward flexed posture; heel strike decreased  -EJ     Comment (Gait/Stairs) cues for sequence and upright posture  -EJ           User Key  (r) = Recorded By, (t) = Taken By, (c) = Cosigned By    Initials Name Provider Type    EJ Allison Oswald, PT Physical Therapist               Obj/Interventions     Row Name 11/04/21 1334          Range of Motion Comprehensive    General Range of Motion no range of motion deficits identified  -EJ     Comment, General Range of Motion x L hip  -     Row Name 11/04/21 1334          Strength Comprehensive (MMT)    Comment, General Manual Muscle Testing (MMT) Assessment post op weakness  -EJ            User Key  (r) = Recorded By, (t) = Taken By, (c) = Cosigned By    Initials Name Provider Type    Allison Sánchez, PT Physical Therapist               Goals/Plan     Row Name 11/04/21 1340          Bed Mobility Goal 1 (PT)    Activity/Assistive Device (Bed Mobility Goal 1, PT) bed mobility activities, all  -EJ     [place] Level/Cues Needed (Bed Mobility Goal 1, PT) contact guard assist  -EJ     Time Frame (Bed Mobility Goal 1, PT) 1 week  -EJ     Row Name 11/04/21 1340          Transfer Goal 1 (PT)    Activity/Assistive Device (Transfer Goal 1, PT) transfers, all; walker, rolling  -EJ     [place] Level/Cues Needed (Transfer Goal 1, PT) contact guard assist  -EJ     Time Frame (Transfer Goal 1, PT) 1 week  -EJ     Row Name 11/04/21 1340          Gait Training Goal 1 (PT)    Activity/Assistive Device (Gait Training Goal 1, PT) gait (walking locomotion); walker, rolling  -EJ     [place] Level (Gait Training Goal 1, PT) contact guard assist  -EJ     Distance (Gait Training Goal 1, PT) 100  -EJ     Time Frame (Gait Training Goal 1, PT) 1 week  -EJ           User Key  (r) = Recorded By, (t) = Taken By, (c) = Cosigned By    Initials Name Provider Type    Allison Sánchez PT Physical Therapist               Clinical Impression     Row Name 11/04/21 1338          Pain    Additional Documentation Pain Scale: FACES Pre/Post-Treatment (Group)  -EJ     Row Name 11/04/21 5756          Pain Scale: FACES Pre/Post-Treatment    Pain: FACES Scale, Pretreatment 2-->hurts little bit  -EJ     Posttreatment Pain Rating 4-->hurts little more  -EJ     Pain Location - Side Left  -EJ     Pain Location hip  -EJ     Row Name 11/04/21 8670          Plan of Care Review    Plan of Care Reviewed With patient  -EJ     Progress improving  -EJ     Outcome Summary Pt agreeable to PT this afternoon. She is s/p L hip IM nailing and presents with post op pain, weakness, and decreased functional mobility. Pt also with hx of  dementia and confused throughout session. She is, however, moving quite well. She was up in chair upon entry to room. She was able to stand with min A. She ambulated approx 35 ft with Rwx and CGA/min A. 2nd person following with chair for safety. She will likely benefit from SNU at [place]. She will continue to benefit from skilled PT to maximize safety, strength, and independence with mobility.  -EJ     Row Name 11/04/21 2730          Therapy Assessment/Plan (PT)    Patient/Family Therapy Goals Statement (PT) did not state  -EJ     Rehab Potential (PT) good, to achieve stated therapy goals  -EJ     Criteria for Skilled Interventions Met (PT) yes  -EJ     Row Name 11/04/21 5747          Positioning and Restraints    Pre-Treatment Position sitting in chair/recliner  -EJ     Post Treatment Position chair  -EJ     In Chair notified nsg; reclined; call light within reach; encouraged to call for assist; exit alarm on  -EJ           User Key  (r) = Recorded By, (t) = Taken By, (c) = Cosigned By    Initials Name Provider Type    Allison Sánchez, PT Physical Therapist               Outcome Measures     Row Name 11/04/21 1340          How much help from another person do you currently need...    Turning from your back to your side while in flat bed without using bedrails? 3  -EJ     Moving from lying on back to sitting on the side of a flat bed without bedrails? 2  -EJ     Moving to and from a bed to a chair (including a wheelchair)? 3  -EJ     Standing up from a chair using your arms (e.g., wheelchair, bedside chair)? 3  -EJ     Climbing 3-5 steps with a railing? 2  -EJ     To walk in hospital room? 3  -EJ     AM-PAC 6 Clicks Score (PT) 16  -EJ     Row Name 11/04/21 1340          Functional Assessment    Outcome Measure Options AM-PAC 6 Clicks Basic Mobility (PT)  -EJ           User Key  (r) = Recorded By, (t) = Taken By, (c) = Cosigned By    Initials Name Provider Type    Allison Sánchez, PT Physical Therapist                              Physical Therapy Education                 Title: PT OT SLP Therapies (In Progress)     Topic: Physical Therapy (In Progress)     Point: Mobility training (Done)     Learning Progress Summary           Patient Acceptance, E,TB,D, VU,NR by  at 11/4/2021 1341                   Point: Home exercise program (Not Started)     Learner Progress:  Not documented in this visit.          Point: Body mechanics (Not Started)     Learner Progress:  Not documented in this visit.          Point: Precautions (Not Started)     Learner Progress:  Not documented in this visit.                      User Key     Initials Effective Dates Name Provider Type Discipline     06/16/21 -  Allison Oswald, PT Physical Therapist PT              PT Recommendation and Plan  Planned Therapy Interventions (PT): balance training, bed mobility training, gait training, home exercise program, patient/family education, stretching, strengthening, ROM (range of motion), transfer training  Plan of Care Reviewed With: patient  Progress: improving  Outcome Summary: Pt agreeable to PT this afternoon. She is s/p L hip IM nailing and presents with post op pain, weakness, and decreased functional mobility. Pt also with hx of dementia and confused throughout session. She is, however, moving quite well. She was up in chair upon entry to room. She was able to stand with min A. She ambulated approx 35 ft with Rwx and CGA/min A. 2nd person following with chair for safety. She will likely benefit from SNU at [place]. She will continue to benefit from skilled PT to maximize safety, strength, and independence with mobility.     Time Calculation:    PT Charges     Row Name 11/04/21 1341             Time Calculation    Start Time 1238  -EJ      Stop Time 1255  -EJ      Time Calculation (min) 17 min  -EJ      PT Received On 11/04/21  -EJ      PT - Next Appointment 11/05/21  -      PT Goal Re-Cert Due Date 11/11/21  -EJ              Time  Calculation- PT    Total Timed Code Minutes- PT 12 minute(s)  -FLETCHER            User Key  (r) = Recorded By, (t) = Taken By, (c) = Cosigned By    Initials Name Provider Type    Allison Sánchez, PT Physical Therapist              Therapy Charges for Today     Code Description Service Date Service Provider Modifiers Qty    09350439391  PT EVAL MOD COMPLEXITY 2 11/4/2021 Allison Oswald, PT GP 1    12417795974 HC PT THER PROC EA 15 MIN 11/4/2021 Allison Oswald, PT GP 1    [id number]  PT THER SUPP EA 15 MIN 11/4/2021 Allison Oswald, PT GP 1          PT G-Codes  Outcome Measure Options: AM-PAC 6 Clicks Basic Mobility (PT)  AM-PAC 6 Clicks Score (PT): 16    Allison Oswald, PT  11/4/2021

## 2021-11-04 NOTE — DISCHARGE PLACEMENT REQUEST
"Sheng Rosa (81 y.o. Female)             Date of Birth Social Security Number Address Home Phone MRN    1940  [address] [id number] [phone] [id number]    [place] Marital Status             None        Admission Date Admission Type Admitting Provider Attending Provider Department, Room/Bed    11/3/21 Emergency Marc Miranda MD [place], Robert Russell, MD [address], P876/1    Discharge Date Discharge Disposition Discharge Destination                         Attending Provider: Geovanni Brar MD    Allergies: Quinine Derivatives, Sulfa Antibiotics    Isolation: None   Infection: None   Code Status: No CPR   Advance Care Planning Activity    Ht: 167.6 cm (66\")   Wt: 53.9 kg (118 lb 12.8 oz)    Admission Cmt: None   Principal Problem: Closed nondisplaced intertrochanteric fracture of left femur (HCC) [S72.145A]                 Active Insurance as of 11/3/2021     Primary Coverage     Payor Plan Insurance Group Employer/Plan Group    MEDICARE MEDICARE A & B      Payor Plan Address Payor Plan Phone Number Payor Plan Fax Number Effective Dates    [address] [phone]  1/1/2005 - None Entered    [facility] [address]       Subscriber Name Subscriber Birth Date Member ID       SHENG ROSA 1940 [id number]           Secondary Coverage     Payor Plan Insurance Group Employer/Plan Group    AARP MC SUP AAR HEALTH CARE OPTIONS      Payor Plan Address Payor Plan Phone Number Payor Plan Fax Number Effective Dates    [facility] [phone]  1/1/2016 - None Entered    [address]       [facility] [address]       Subscriber Name Subscriber Birth Date Member ID       SHENG ROSA 1940 [id number]                 Emergency Contacts      (Rel.) Home Phone Work Phone Mobile Phone    John Rosa (Spouse) [phone] -- --    Ulisses Rosa (Son) [phone] -- [phone]              "

## 2021-11-04 NOTE — PROGRESS NOTES
Orthopaedic Surgery   Daily Progress Note  Dr. JUAN MIGUEL Jones” Jose Miguel DONNELLY  [phone]  DEMOGRAPHICS:   · Samantha Davila   · Age:81 y.o.   · MRN:[id number]  · Admitted: 11/3/2021    PROCEDURE: 1 Day Post-Op s/p Procedure(s) with comments:  FEMUR INTRAMEDULLARY NAILING/RODDING - [place] Table     HOSPITAL PROGRESS  · Patient Issues: Confusion since surgery.  Patient pulled off her own dressings a couple times.  Otherwise has been stable  · Ambulation/Activity: Ambulating minimally with PT/Nursing.      VITALS:  Vitals:    11/03/21 1500 11/03/21 1943 11/03/21 2314 11/04/21 0321   BP: 106/66 122/64 120/71 123/73   BP Location: Right arm Left arm Left arm Right arm   Patient Position: Sitting Lying Sitting Sitting   Pulse: 68 96 83 83   Resp: 14 14 15 15   Temp: 98 °F (36.7 °C) 98.4 °F (36.9 °C) 99.1 °F (37.3 °C) 98.4 °F (36.9 °C)   TempSrc: Skin Skin Skin Skin   SpO2: 97% 99% 98% 98%   Height:           PHYSICAL EXAM:  · LUNGS: Equal chest rise, no shortness of air  · CARDIOVASCULAR: brisk capillary refill intact  · WOUND: Dressings clean, dry, and intact  · EXTREMITY: Operative Leg  · Pulses: brisk capillary refill intact  · Sensation: Sensation intact to light touch to the saphenous/sural/tibial/deep peroneal/superficial peroneal nerves, and grossly throughout the extremity.  · Motor: 5/5 EHL/FHL/TA/GS motor complexes    LABS:   Lab Results   Component Value Date    HGB 12.6 11/04/2021     Lab Results   Component Value Date    WBC 11.68 (H) 11/04/2021     Lab Results   Component Value Date    GLUCOSE 116 (H) 11/03/2021    CALCIUM 8.6 11/03/2021     11/03/2021    K 4.8 11/03/2021    CO2 26.2 11/03/2021     11/03/2021    BUN 10 11/03/2021    CREATININE 0.51 (L) 11/03/2021    EGFRIFAFRI 99 10/27/2021    EGFRIFNONA 116 11/03/2021    BCR 19.6 11/03/2021    ANIONGAP 8.8 11/03/2021       ASSESSMENT: Patient is a 81 y.o. female who is 1 Day Post-Op s/p Procedure(s) with comments:  FEMUR INTRAMEDULLARY NAILING/RODDING -  "[employer] Table     PLAN:   · Weight Bearing: Weight Bearing As Tolerated  · Labs: Above lab values review. Plan: Hemoglobin okay  · PT/OT: To Mobilize  · DVT PPX: ASA 325mg Daily for 30 days  · Post-Op Xray: Hardware in good position. Acceptable bony reduction.   · Follow-Up: In office at 3 weeks postop  · Dispo: Rehab when stable, follow-up with me in 3 weeks    R \"Jayjay\" Jose Miguel DONNELLY MD  Orthopaedic Surgery  [facility]  [phone] - [place] Office  [phone] - [place] Office      "

## 2021-11-04 NOTE — PLAN OF CARE
Goal Outcome Evaluation:              Outcome Summary: Pt alert to self, unable to follow instructions at times, pulling dressing off, pulling IV out. Treated pain, reassured, provided distraction, bed alarm on, multple attempts to get out of bed.  Superior incision with some bleeding after patient interference with dressing, pressure applied, bleeding small and controlled.  Replaced optifoam with gauze to incisions.  MRI of brain delayed due to MRI department unable to get to her - patient will need to be medicated before MRI.  See MAR. Will continue to monitor.

## 2021-11-04 NOTE — CASE MANAGEMENT/SOCIAL WORK
Discharge Planning Assessment  [facility]     Patient Name: Samantha Davila  MRN: [id number]  Today's Date: 11/4/2021    Admit Date: 11/3/2021     Discharge Needs Assessment     Row Name 11/04/21 1119       Living Environment    Lives With spouse    Name(s) of Who Lives With Patient /John.    Current Living Arrangements home/apartment/condo    Primary Care Provided by self    Provides Primary Care For no one    Family Caregiver if Needed spouse; child(vini), adult    Quality of Family Relationships helpful; involved; supportive    Able to Return to Prior Arrangements yes       Resource/Environmental Concerns    Transportation Concerns car, none       Transition Planning    Patient/Family Anticipates Transition to inpatient rehabilitation facility    Patient/Family Anticipated Services at Transition     Transportation Anticipated health plan transportation       Discharge Needs Assessment    Readmission Within the Last 30 Days no previous admission in last 30 days    Equipment Currently Used at Home walker, rolling; rollator; grab bar; shower chair; commode    Concerns to be Addressed discharge planning    Discharge Facility/Level of Care Needs nursing facility, skilled    Provided Post Acute Provider List? N/A    N/A Provider List Comment Requests [facility].               Discharge Plan     Row Name 11/04/21 1121       Plan    Plan [facility] -- Referral Pending.    Patient/Family in Agreement with Plan yes    Plan Comments Per Epic documentation, the patient is not fully alert/orietned at this time. Therefore CCP spoke with her /John, verified current information and explained the role of the CCP. Patient lives with John and has family support. She's overall dependent on John to assist with most of her ADLs. She's been to [facility] and John cannot remember the name of the  agency she's worked with in the past. John  plans for the patient to d/c to SNF. He requests a referral to [facility]. Referral sent in [employer]. Called and left a message for Narinder. Patient will need transportation arranged at d/c. CCP will follow.              Continued Care and Services - Admitted Since 11/3/2021     Destination     Service Provider Request Status Selected Services Address Phone Fax Patient Preferred    [facility]  Pending - Request Sent N/A [address] [address] [phone] --                 Demographic Summary     Row Name 11/04/21 1118       General Information    Admission Type inpatient    Reason for Consult discharge planning    Preferred Language English     Used During This Interaction no       Contact Information    Permission Granted to Share Info With ; family/designee               Functional Status     Row Name 11/04/21 1118       Functional Status    Usual Activity Tolerance fair       Functional Status, IADL    Medications assistive person    Meal Preparation assistive equipment and person    Housekeeping assistive equipment and person    Laundry assistive equipment and person    Shopping assistive equipment and person       Mental Status Summary    Recent Changes in Mental Status/Cognitive Functioning no changes               Psychosocial     Row Name 11/04/21 1118       Intellectual Performance WDL    Level of Consciousness Alert       Coping/Stress    Patient Personal Strengths able to adapt    Sources of Support adult child(vini); spouse    Reaction to Health Status accepting    Understanding of Condition and Treatment adequate understanding of medical condition       Developmental Stage (Eriksson's)    Developmental Stage Stage 8 (65 years-death/Late Adulthood) Integrity vs. Despair               Abuse/Neglect    No documentation.                Legal    No documentation.                Substance Abuse    No  documentation.                Patient Forms    No documentation.                   Alesia Coy RN

## 2021-11-04 NOTE — PLAN OF CARE
Goal Outcome Evaluation:              Outcome Summary: POD 1 L IM Nailing, confused but pleasant, assist x1, VSS, RA, voiding per brp, brief in place for urgency, mod drainage on optifoam, rehab at d/c, unable to educate on comorbidities due to confusion, CTM

## 2021-11-04 NOTE — PROGRESS NOTES
" LOS: 1 day     Name: Samantha Davila  Age: 81 y.o.  Sex: female  :  1940  MRN: [id number]         Primary Care Physician: Chang Borrego MD    Subjective   Subjective  Received Valium this morning prior to brain MRI so she is fairly sedate.  Not really able to provide any history.  Sleepy.  Nurse states she was confused and agitated overnight last night.    Objective   Vital Signs  Temp:  [97.5 °F (36.4 °C)-99.1 °F (37.3 °C)] 97.5 °F (36.4 °C)  Heart Rate:  [68-96] 70  Resp:  [14-18] 16  BP: (106-132)/(64-76) 128/73  Body mass index is 19.17 kg/m².    Objective:  General Appearance:  Comfortable and in no acute distress (Elderly, weak and frail appearing).    Vital signs: (most recent): Blood pressure 128/73, pulse 70, temperature 97.5 °F (36.4 °C), temperature source Skin, resp. rate 16, height 167.6 cm (66\"), SpO2 98 %.    Lungs:  Normal effort and normal respiratory rate.  She is not in respiratory distress.  There are decreased breath sounds.    Heart: Normal rate.  Regular rhythm.    Abdomen: Abdomen is soft.  Bowel sounds are normal.   There is no abdominal tenderness.     Extremities: There is no dependent edema or local swelling.    Neurological: (Sleeping soundly after receiving Valium this morning.  Difficult to arouse.).    Skin:  Warm and dry.              Results Review:       I reviewed the patient's new clinical results.    Results from last 7 days   Lab Units 218   WBC 10*3/mm3 11.68* 12.88* 7.55   HEMOGLOBIN g/dL 12.6 13.0 14.0   PLATELETS 10*3/mm3 309 335 320     Results from last 7 days   Lab Units 21  0058   SODIUM mmol/L 137 137 138   POTASSIUM mmol/L 4.0 4.8 3.7   CHLORIDE mmol/L 100 102 100   CO2 mmol/L 29.8* 26.2 27.5   BUN mg/dL 8 10 9   CREATININE mg/dL 0.59 0.51* 0.59   CALCIUM mg/dL 8.8 8.6 9.1   GLUCOSE mg/dL 118* 116* 124*     Results from last 7 days   Lab Units 21  0058   INR  1.01 "             Scheduled Meds:   aspirin, 325 mg, Oral, Daily  Budesonide, 9 mg, Oral, Daily  lactobacillus acidophilus, 1 capsule, Oral, Daily  memantine, 5 mg, Oral, Daily  OLANZapine, 5 mg, Oral, Nightly  sodium chloride, 10 mL, Intravenous, Q12H      PRN Meds:   •  acetaminophen **OR** acetaminophen **OR** acetaminophen  •  calcium carbonate  •  HYDROcodone-acetaminophen  •  HYDROcodone-acetaminophen  •  HYDROcodone-acetaminophen  •  Morphine  •  ondansetron **OR** ondansetron  •  [COMPLETED] Insert peripheral IV **AND** sodium chloride  •  sodium chloride  Continuous Infusions:  lactated ringers, 9 mL/hr, Last Rate: 9 mL/hr (11/03/21 0703)        Assessment/Plan   Active Hospital Problems    Diagnosis  POA   • **Closed nondisplaced intertrochanteric fracture of left femur (HCC) [S72.145A]  Yes   • Episodic confusion [R41.0]  Yes   • Hyperlipidemia [E78.5]  Yes   • Colitis, collagenous [K52.831]  Yes   • Fall [W19.XXXA]  Yes   • Neurodermatitis [L28.0]  Yes      Resolved Hospital Problems   No resolved problems to display.       Assessment & Plan    -POD 1 from left femur intramedullary nailing  -Hemoglobin appears stable.  Will begin working with physical therapy.  Will likely need rehab and will follow up with orthopedic surgery in the office in 3 weeks  -He has had progressive confusion at home.  Brain MRI this morning did not reveal any acute stroke.  Metabolic work-up unremarkable.  Neurology following and feel that she has an element of dementia.  -We will add oral Zyprexa at bedtime tonight.  -On aspirin for DVT prophylaxis.  Had some bleeding from incision yesterday.  Observe today and consider low-dose Eliquis tomorrow.    Dispo  Will need subacute rehab    I wore full protective equipment throughout the patient encounter including eye protection and facemask.  Hand hygiene was performed before donning protective equipment and after removal when leaving the room.    Geovanni Brar MD  [facility]  11/04/21  10:33 EDT

## 2021-11-04 NOTE — PROGRESS NOTES
Patient Identification:  NAME:  Samantha Davila  Age:  81 y.o.   Sex:  female   :  1940   MRN:  [id number]       Chief complaint: Left hip fracture, dementia    History of present illness: The MRI looks pretty good for her age there is no acute stroke or neoplastic process ventricles look about right for her age B12 folate and thyroid functions all appear to be normal  She has been on Namenda for her dementia and I note that she is not on Aricept or any antidepressant medication.      Past medical history:  Past Medical History:   Diagnosis Date   • Arthritis    • Asthma    • Colitis, collagenous    • Colon polyp    • Insomnia        Allergies:  Quinine derivatives and Sulfa antibiotics    Home medications:  Medications Prior to Admission   Medication Sig Dispense Refill Last Dose   • Cetirizine-Pseudoephedrine (WAL-ZYR D PO) TAKE 1 TABLET BY MOUTH TWICE DAILY   2021 at Unknown time   • Cholecalciferol (VITAMIN D3) 2000 units tablet Take  by mouth.   2021 at Unknown time   • Loperamide HCl (IMODIUM PO) Take  by mouth.   2021 at Unknown time   • memantine (NAMENDA) 5 MG tablet Take 1 tablet by mouth Daily. 30 tablet 0 2021 at Unknown time   • Multiple Vitamins-Minerals (WOMENS 50+ MULTI VITAMIN/MIN PO) Take  by mouth.   2021 at Unknown time   • Probiotic Product (PROBIOTIC DAILY PO) Take  by mouth.   2021 at Unknown time   • zolpidem (AMBIEN) 5 MG tablet TAKE 1 TABLET BY MOUTH AT NIGHT AS NEEDED FOR SLEEP 30 tablet 5 2021 at Unknown time   • Budesonide (ENTOCORT EC) 3 MG 24 hr capsule TAKE 3 CS PO QD  12         Hospital medications:  aspirin, 325 mg, Oral, Daily  Budesonide, 9 mg, Oral, Daily  escitalopram, 5 mg, Oral, Nightly  lactobacillus acidophilus, 1 capsule, Oral, Daily  memantine, 5 mg, Oral, Daily  sodium chloride, 10 mL, Intravenous, Q12H      lactated ringers, 9 mL/hr, Last Rate: 9 mL/hr (21 0703)      •  acetaminophen **OR** acetaminophen **OR**  "acetaminophen  •  calcium carbonate  •  HYDROcodone-acetaminophen  •  HYDROcodone-acetaminophen  •  HYDROcodone-acetaminophen  •  Morphine  •  ondansetron **OR** ondansetron  •  [COMPLETED] Insert peripheral IV **AND** sodium chloride  •  sodium chloride      Objective:  Vitals Ranges:   Temp:  [97.5 °F (36.4 °C)-99.1 °F (37.3 °C)] 97.5 °F (36.4 °C)  Heart Rate:  [68-96] 70  Resp:  [14-16] 16  BP: (106-128)/(64-73) 128/73      Physical Exam:  Patient is awake and alert she thinks she is at a \"memory care center\" but normal cranial nerves II through VII good  strength reflexes trace toes downgoing    Results review:   I reviewed the patient's new clinical results.    Data review:  Lab Results (last 24 hours)     Procedure Component Value Units Date/Time    Folate [[id number]]  (Normal) Collected: 11/04/21 0453    Specimen: Blood Updated: 11/04/21 0734     Folate 15.00 ng/mL     Narrative:      Results may be falsely increased if patient taking Biotin.      Comprehensive Metabolic Panel [[id number]]  (Abnormal) Collected: 11/04/21 0453    Specimen: Blood Updated: 11/04/21 0643     Glucose 118 mg/dL      BUN 8 mg/dL      Creatinine 0.59 mg/dL      Sodium 137 mmol/L      Potassium 4.0 mmol/L      Chloride 100 mmol/L      CO2 29.8 mmol/L      Calcium 8.8 mg/dL      Total Protein 6.2 g/dL      Albumin 4.20 g/dL      ALT (SGPT) 18 U/L      AST (SGOT) 27 U/L      Alkaline Phosphatase 61 U/L      Total Bilirubin 0.6 mg/dL      eGFR Non African Amer 98 mL/min/1.73      Globulin 2.0 gm/dL      A/G Ratio 2.1 g/dL      BUN/Creatinine Ratio 13.6     Anion Gap 7.2 mmol/L     Narrative:      GFR Normal >60  Chronic Kidney Disease <60  Kidney Failure <15      TSH [[id number]]  (Normal) Collected: 11/04/21 0453    Specimen: Blood Updated: 11/04/21 0629     TSH 0.400 uIU/mL     CBC (No Diff) [[id number]]  (Abnormal) Collected: 11/04/21 0453    Specimen: Blood Updated: 11/04/21 0554     WBC 11.68 10*3/mm3      RBC 4.06 10*6/mm3      " Hemoglobin 12.6 g/dL      Hematocrit 36.3 %      MCV 89.4 fL      MCH 31.0 pg      MCHC 34.7 g/dL      RDW 12.1 %      RDW-SD 39.3 fl      MPV 10.0 fL      Platelets 309 10*3/mm3            Imaging:  Imaging Results (Last 24 Hours)     Procedure Component Value Units Date/Time    MRI Brain With & Without Contrast [[id number]] Collected: 11/04/21 0858     Updated: 11/04/21 0907    Narrative:      MRI BRAIN WITH AND WITHOUT CONTRAST 11/04/2021     CLINICAL HISTORY: Memory loss.     TECHNIQUE: Decline in mental function per family.     TECHNIQUE: Axial T1, FLAIR, fat-suppressed T2, axial diffusion and  gradient echo T2, sagittal T1 and postcontrast axial fat-suppressed T1  and coronal T1-weighted images were obtained of the entire head.     COMPARISON: This is correlated to a prior noncontrast head CT from  [facility] 03/02/2019. There are no prior MRIs of the  brain for comparison.     FINDINGS: There are multiple patchy nodular foci of T2 high signal in  the periventricular and subcortical white matter of the cerebral  hemispheres and some patchy T2 high signal in the central to posterior  eliseo and the findings are most consistent with moderate small vessel  disease. There is mild diffuse cerebral atrophy and the lateral and  third ventricles are mildly prominent in size, felt to be due to central  volume loss or atrophy. I see no focal mass effect and no midline shift  and no extra-axial fluid collections are identified and no abnormal  areas of enhancement are seen in the head. The paranasal sinuses and  mastoid air cells and middle ear cavities are clear. Good flow voids are  demonstrated within the cerebral vessels and in the dural venous  sinuses. The calvarium and skull base demonstrate normal marrow signal  intensity. The orbits are unremarkable.       Impression:      1. No acute intracranial abnormality is seen.  2. There is moderate small vessel disease in the cerebral white matter  and there  is diffuse cerebral atrophy and the lateral and third  ventricles are prominent in size, felt to be on the basis of central  volume loss or atrophy. The remainder of the MRI of the head is within  normal limits.  3. Incidental note is made of severe arthritic changes at the  articulation of the left lateral masses of C1 and C2 with joint space  narrowing, exuberant marginal spurring and degenerative marrow edema and  enhancement in the left lateral masses of C1 and C2.     This report was finalized on 11/4/2021 9:04 AM by Dr. Geovanni Guerrero M.D.            PPE worn at all times washed before washed off afterwards was now within 6 feet of her room in a few minutes during my exam no aerosols used at any point    Assessment and Plan:     First of all, this patient definitely has some degree of dementia.  Nonetheless MRI of her head is basically within normal limits for her age her B12 folate and thyroid testing is all normal.  She is already on Namenda but I think her overall mental function might be improved with a very low-dose antidepressant medication.  I am going to start her on Lexapro 5 mg p.o. nightly.  In short I do not think this will harm her at all but could be associated with some improvement in her overall mental function.  If there are any side effects from it or worsening of confusion at any point obviously the Lexapro could be DC'd  I have discontinued Zyprexa at this point and she seems to be calm and doing a little bit better but is still disoriented  Neurology will sign off and follow-up.  Reconsult thanks      Jani Flores MD  11/04/21  11:19 EDT

## 2021-11-05 ENCOUNTER — TELEPHONE (OUTPATIENT)
Dept: NEUROLOGY | Facility: CLINIC | Age: 81
End: 2021-11-05

## 2021-11-05 LAB
ANION GAP SERPL CALCULATED.3IONS-SCNC: 9.8 MMOL/L (ref 5–15)
BUN SERPL-MCNC: 10 MG/DL (ref 8–23)
BUN/CREAT SERPL: 21.7 (ref 7–25)
CALCIUM SPEC-SCNC: 8.3 MG/DL (ref 8.6–10.5)
CHLORIDE SERPL-SCNC: 100 MMOL/L (ref 98–107)
CO2 SERPL-SCNC: 29.2 MMOL/L (ref 22–29)
CREAT SERPL-MCNC: 0.46 MG/DL (ref 0.57–1)
DEPRECATED RDW RBC AUTO: 40.9 FL (ref 37–54)
ERYTHROCYTE [DISTWIDTH] IN BLOOD BY AUTOMATED COUNT: 12.2 % (ref 12.3–15.4)
GFR SERPL CREATININE-BSD FRML MDRD: 130 ML/MIN/1.73
GLUCOSE SERPL-MCNC: 111 MG/DL (ref 65–99)
HCT VFR BLD AUTO: 34.4 % (ref 34–46.6)
HGB BLD-MCNC: 11.4 G/DL (ref 12–15.9)
MCH RBC QN AUTO: 30.4 PG (ref 26.6–33)
MCHC RBC AUTO-ENTMCNC: 33.1 G/DL (ref 31.5–35.7)
MCV RBC AUTO: 91.7 FL (ref 79–97)
PLATELET # BLD AUTO: 263 10*3/MM3 (ref 140–450)
PMV BLD AUTO: 10.1 FL (ref 6–12)
POTASSIUM SERPL-SCNC: 4 MMOL/L (ref 3.5–5.2)
RBC # BLD AUTO: 3.75 10*6/MM3 (ref 3.77–5.28)
SODIUM SERPL-SCNC: 139 MMOL/L (ref 136–145)
WBC # BLD AUTO: 10.67 10*3/MM3 (ref 3.4–10.8)

## 2021-11-05 PROCEDURE — 97110 THERAPEUTIC EXERCISES: CPT

## 2021-11-05 PROCEDURE — 85027 COMPLETE CBC AUTOMATED: CPT | Performed by: INTERNAL MEDICINE

## 2021-11-05 PROCEDURE — 80048 BASIC METABOLIC PNL TOTAL CA: CPT | Performed by: INTERNAL MEDICINE

## 2021-11-05 RX ORDER — POLYETHYLENE GLYCOL 3350 17 G/17G
17 POWDER, FOR SOLUTION ORAL DAILY
Status: DISCONTINUED | OUTPATIENT
Start: 2021-11-05 | End: 2021-11-07 | Stop reason: HOSPADM

## 2021-11-05 RX ADMIN — ACETAMINOPHEN 650 MG: 325 TABLET, FILM COATED ORAL at 10:01

## 2021-11-05 RX ADMIN — POLYETHYLENE GLYCOL 3350 17 G: 17 POWDER, FOR SOLUTION ORAL at 09:29

## 2021-11-05 RX ADMIN — APIXABAN 2.5 MG: 2.5 TABLET, FILM COATED ORAL at 20:00

## 2021-11-05 RX ADMIN — SODIUM CHLORIDE, PRESERVATIVE FREE 10 ML: 5 INJECTION INTRAVENOUS at 09:32

## 2021-11-05 RX ADMIN — MEMANTINE HYDROCHLORIDE 5 MG: 5 TABLET, FILM COATED ORAL at 09:31

## 2021-11-05 RX ADMIN — Medication 1 CAPSULE: at 09:31

## 2021-11-05 RX ADMIN — BUDESONIDE 9 MG: 3 CAPSULE, GELATIN COATED ORAL at 09:31

## 2021-11-05 RX ADMIN — SODIUM CHLORIDE, PRESERVATIVE FREE 10 ML: 5 INJECTION INTRAVENOUS at 20:02

## 2021-11-05 RX ADMIN — ESCITALOPRAM 5 MG: 5 TABLET, FILM COATED ORAL at 20:00

## 2021-11-05 RX ADMIN — APIXABAN 2.5 MG: 2.5 TABLET, FILM COATED ORAL at 09:37

## 2021-11-05 RX ADMIN — HYDROCODONE BITARTRATE AND ACETAMINOPHEN 1 TABLET: 5; 325 TABLET ORAL at 19:59

## 2021-11-05 NOTE — PLAN OF CARE
Goal Outcome Evaluation:  Plan of Care Reviewed With: patient        Progress: improving  Outcome Summary: Pt remains stable. Confused throughout shift but no behaviorial issues, new order of lexapro PO started. Pt is ambulating with walker use and 1 assist. Dressing to L hip is intact with dried drng not requiring a change. Norco given x1 for pain. Voiding per BRP. No BM, note left for MD to possibly order something for that. Plans to d/c to SNF when precerpt obtained and accepted. Updates given to  throughout the shift as well. WCTM.

## 2021-11-05 NOTE — PLAN OF CARE
Goal Outcome Evaluation:  Plan of Care Reviewed With: patient        Progress: improving  Outcome Summary: Patient alert to person and situation, easily reorients to time and place but is forgetful. Patient cooperative with most care but is demanding at times. Ambulating with walker and x1 assist. VSS and voiding function intact. Pain managed with po tylenol. Plan for d/c to snf tomorrow when bed comes available.

## 2021-11-05 NOTE — PROGRESS NOTES
" LOS: 2 days     Name: Samantha Davila  Age: 81 y.o.  Sex: female  :  1940  MRN: [id number]         Primary Care Physician: Chang Borrego MD    Subjective   Subjective  No acute overnight events.  More awake, pleasant and conversant with me than she was yesterday.  Only complaint is that of left hip soreness    Objective   Vital Signs  Temp:  [97.3 °F (36.3 °C)-97.7 °F (36.5 °C)] 97.5 °F (36.4 °C)  Heart Rate:  [70-89] 76  Resp:  [16] 16  BP: ()/(52-73) 115/66  Body mass index is 19.05 kg/m².    Objective:  General Appearance:  Comfortable and in no acute distress (Elderly and frail-appearing).    Vital signs: (most recent): Blood pressure 115/66, pulse 76, temperature 97.5 °F (36.4 °C), temperature source Skin, resp. rate 16, height 167.6 cm (66\"), weight 53.5 kg (118 lb), SpO2 94 %.    Lungs:  Normal effort and normal respiratory rate.    Heart: Normal rate.  Regular rhythm.    Abdomen: Abdomen is soft.  Bowel sounds are normal.   There is no abdominal tenderness.     Extremities: There is no dependent edema or local swelling.    Neurological: Patient is alert.  (Pleasantly confused).    Skin:  Warm and dry.              Results Review:       I reviewed the patient's new clinical results.    Results from last 7 days   Lab Units 21   WBC 10*3/mm3 10.67 11.68* 12.88* 7.55   HEMOGLOBIN g/dL 11.4* 12.6 13.0 14.0   PLATELETS 10*3/mm3 263 309 335 320     Results from last 7 days   Lab Units 218   SODIUM mmol/L 139 137 137 138   POTASSIUM mmol/L 4.0 4.0 4.8 3.7   CHLORIDE mmol/L 100 100 102 100   CO2 mmol/L 29.2* 29.8* 26.2 27.5   BUN mg/dL 10 8 10 9   CREATININE mg/dL 0.46* 0.59 0.51* 0.59   CALCIUM mg/dL 8.3* 8.8 8.6 9.1   GLUCOSE mg/dL 111* 118* 116* 124*     Results from last 7 days   Lab Units 21  0058   INR  1.01             Scheduled Meds:   aspirin, 325 mg, Oral, " Daily  Budesonide, 9 mg, Oral, Daily  escitalopram, 5 mg, Oral, Nightly  lactobacillus acidophilus, 1 capsule, Oral, Daily  memantine, 5 mg, Oral, Daily  polyethylene glycol, 17 g, Oral, Daily  sodium chloride, 10 mL, Intravenous, Q12H      PRN Meds:   •  acetaminophen **OR** acetaminophen **OR** acetaminophen  •  calcium carbonate  •  HYDROcodone-acetaminophen  •  HYDROcodone-acetaminophen  •  HYDROcodone-acetaminophen  •  Morphine  •  ondansetron **OR** ondansetron  •  [COMPLETED] Insert peripheral IV **AND** sodium chloride  •  sodium chloride  Continuous Infusions:  lactated ringers, 9 mL/hr, Last Rate: 9 mL/hr (11/03/21 0703)        Assessment/Plan   Active Hospital Problems    Diagnosis  POA   • **Closed nondisplaced intertrochanteric fracture of left femur (HCC) [S72.145A]  Yes   • Episodic confusion [R41.0]  Yes   • Hyperlipidemia [E78.5]  Yes   • Colitis, collagenous [K52.831]  Yes   • Fall [W19.XXXA]  Yes   • Neurodermatitis [L28.0]  Yes      Resolved Hospital Problems   No resolved problems to display.       Assessment & Plan    -POD 2 from left femur intramedullary nailing  -Physical therapy. Will likely need rehab and will follow up with orthopedic surgery in the office in 3 weeks  -He has had progressive confusion at home.  Brain MRI this morning did not reveal any acute stroke.  Metabolic work-up unremarkable.  Neurology following and feel that she has an element of dementia.  Started on Lexapro.  -Hemoglobin stable.  Low dose Eliquis for DVT prophylaxis     Dispo  Will need subacute rehab.  Can be discharged once this is arranged.      I wore full protective equipment throughout the patient encounter including eye protection and facemask.  Hand hygiene was performed before donning protective equipment and after removal when leaving the room.    Geovanni Brar MD  [facility]  11/05/21  08:01 EDT

## 2021-11-05 NOTE — TELEPHONE ENCOUNTER
"Caller: John Davila    Relationship: Emergency Contact; SPOUSE    Best call back number: [phone]    What was the call regarding: PT'S  CALLED STATING PT WAS TO BE DISCHARGED FROM HOSPITAL TOMORROW, 11/6/21 & TRANSPORTED TO [facility]. PT'S  WAS ASKING IF [facility] WOULD BE TRANSPORTING PT TO OUR OFFICE FOR HER VISIT WITH DR. BILL ON Monday, 11/8/21. I INFORMED PT'S  THAT I WAS UNSURE OF THE ANSWER TO HIS QUESTION AS THIS SEEMED LIKE INFORMATION TO BE PROVIDED BY HOSPITAL STAFF OR [facility] STAFF.    PT'S  WAS ASKING WHY APPT WAS NECESSARY IF \"MRI SHOWED NOTHING WRONG\". I INFORMED HIM THAT THESE WOULD BE QUESTIONS FOR HOSPITAL STAFF & PT'S APPT WITH DR. BILL ON MONDAY WOULD BE FOR OUTPATIENT TREATMENT OF MEMORY CHANGES & TO MANAGEMENT ANY ASSOCIATED MEDICATIONS IN THESE REGARDS.     I ADVISED PT'S  TO REACH OUT TO [facility] WITH HIS QUESTION REGARDING TRANSPORTATION & INSTRUCTED HIM TO CALL BACK IF THEY ADVISED IT WOULD BE BEST TO RESCHEDULE APPT FOR A TIME/DATE WHEN PT WOULD BE MORE MOIBLE AS SHE IS CURRENT;Y IN THE HOSPITAL FOR A BROKEN HIP. PT'S  VERBALIZED UNDERSTANDING AND WILL CALL BACK AT HIS EARLIEST CONVENIENCE.    DOCUMENTING PER [employer].      "

## 2021-11-05 NOTE — PLAN OF CARE
Goal Outcome Evaluation:  Plan of Care Reviewed With: patient           Outcome Summary: Pt agreed to PT , but c/o wkness, pain and stiffness in L leg; found pt in chair, pt amb 10ft x2 w/min assist, slow paced , constant cues to keep moving and for sequencing; pt req MOD assist to stand from recliner and elevated commode; plans SNU at [place] possibly tomorrow    Patient was wearing a face mask during this therapy encounter. Therapist used appropriate personal protective equipment including eye protection, mask, and gloves.  Mask used was standard procedure mask. Appropriate PPE was worn during the entire therapy session. Hand hygiene was completed before and after therapy session. Patient is not in enhanced droplet precautions.

## 2021-11-05 NOTE — THERAPY TREATMENT NOTE
Patient Name: Samantha Davila  : 1940    MRN: [id number]                              Today's Date: 2021       Admit Date: 11/3/2021    Visit Dx:     ICD-10-CM ICD-9-CM   1. Closed nondisplaced intertrochanteric fracture of left femur, initial encounter ([facility])  S72.145A 820.21   2. Fall, initial encounter  W19.XXXA E888.9     Patient Active Problem List   Diagnosis   • Neurodermatitis   • Insomnia   • Viral upper respiratory tract infection   • Closed fracture of neck of right femur ([facility])   • Fall   • Weight loss   • Colitis, collagenous   • Age-related osteoporosis without current pathological fracture   • Hyperlipidemia   • Neck pain   • Closed nondisplaced intertrochanteric fracture of left femur ([facility])   • Episodic confusion     Past Medical History:   Diagnosis Date   • Arthritis    • Asthma    • Colitis, collagenous    • Colon polyp    • Insomnia      Past Surgical History:   Procedure Laterality Date   • BACK SURGERY     • FEMUR IM NAILING/RODDING Left 11/3/2021    Procedure: FEMUR INTRAMEDULLARY NAILING/RODDING;  Surgeon: Michael Gillespie II, MD;  Location: [facility];  Service: Orthopedics;  Laterality: Left;  [place] Table   • ROTATOR CUFF REPAIR Left    • TONSILLECTOMY     • TOTAL HIP ARTHROPLASTY Right 3/4/2019    Procedure: TOTAL HIP ARTHROPLASTY ANTERIOR WITH HANA TABLE;  Surgeon: Michael Gillespie II, MD;  Location: [facility];  Service: Orthopedics      General Information     Row Name 21 165          Physical Therapy Time and Intention    Document Type therapy note (daily note)  -     Mode of Treatment individual therapy; physical therapy  -     Row Name 21          General Information    Patient Profile Reviewed yes  -     Existing Precautions/Restrictions fall  -     Row Name 21          Living Environment    Lives With spouse  -     Row Name 21          Cognition    Orientation Status (Cognition) oriented to; person;  place  when asked where she fell, she said she didn't want to talk about it  -     Row Name 11/05/21 1659          Safety Issues, Functional Mobility    Safety Issues Affecting Function (Mobility) problem-solving; safety precaution awareness; positioning of assistive device; judgment; sequencing abilities  -     Impairments Affecting Function (Mobility) balance; coordination; endurance/activity tolerance; pain; range of motion (ROM); strength  -     Cognitive Impairments, Mobility Safety/Performance judgment; problem-solving/reasoning; safety precaution awareness  easily off task, slow to perf task  -           User Key  (r) = Recorded By, (t) = Taken By, (c) = Cosigned By    Initials Name Provider Type    Holly Rivas PTA Physical Therapy Assistant               Mobility     Row Name 11/05/21 1702          Bed Mobility    Comment (Bed Mobility) in chair  -     Row Name 11/05/21 1702          Sit-Stand Transfer    Sit-Stand [place] (Transfers) moderate assist (50% patient effort); verbal cues; nonverbal cues (demo/gesture)  -     Assistive Device (Sit-Stand Transfers) walker, front-wheeled  -     Row Name 11/05/21 1702          Gait/Stairs (Locomotion)    [place] Level (Gait) minimum assist (75% patient effort); verbal cues; nonverbal cues (demo/gesture)  -     Assistive Device (Gait) walker, front-wheeled  -     Distance in Feet (Gait) 10ft x2 ,seated rest on elevated commode ,slow paced, difficulty advancing LLE for amb, multiple cues to keep moving forw  -     Deviations/Abnormal Patterns (Gait) antalgic; khoa decreased; festinating/shuffling; stride length decreased; weight shifting decreased  -     Bilateral Gait Deviations forward flexed posture  -     Left Sided Gait Deviations heel strike decreased; weight shift ability decreased  -     Comment (Gait/Stairs) constant cues for sequencing, step length, and posture  -           User Key  (r) = Recorded By, (t) =  Taken By, (c) = Cosigned By    Initials Name Provider Type    Holly Rivas PTA Physical Therapy Assistant               Obj/Interventions     [facility] Name 11/05/21 1706          Motor Skills    Therapeutic Exercise --  too fatigued after amb to and from BR to exer at present  -           User Key  (r) = Recorded By, (t) = Taken By, (c) = Cosigned By    Initials Name Provider Type    Holly Rvias PTA Physical Therapy Assistant               Goals/Plan    No documentation.                Clinical Impression     Row Name 11/05/21 1706          Pain    Additional Documentation Pain Scale: FACES Pre/Post-Treatment (Group)  -JM     Row Name 11/05/21 1706          Pain Scale: Numbers Pre/Post-Treatment    Pain Intervention(s) Repositioned; Elevated  -JM     Row Name 11/05/21 1706          Pain Scale: FACES Pre/Post-Treatment    Pain: FACES Scale, Pretreatment 6-->hurts even more  -     Posttreatment Pain Rating 8-->hurts whole lot  -     Pain Location - Side Left  -     Pain Location hip  -[facility] 11/05/21 1706          Plan of Care Review    Plan of Care Reviewed With patient  -     Outcome Summary Pt agreed to PT , but c/o wkness, pain and stiffness in L leg; found pt in chair, pt amb 10ft x2 w/min assist, slow paced , constant cues to keep moving and for sequencing; pt req MOD assist to stand from recliner and elevated commode; plans SNU at [place] possibly tomorrow  -[facility] Name 11/05/21 1706          Therapy Assessment/Plan (PT)    Criteria for Skilled Interventions Met (PT) yes  -JM     Row Name 11/05/21 1706          Vital Signs    O2 Delivery Pre Treatment room air  -[facility] 11/05/21 1706          Positioning and Restraints    Pre-Treatment Position sitting in chair/recliner  -     Post Treatment Position chair  -     In Chair reclined; call light within reach; encouraged to call for assist; exit alarm on; notified nsg  -           User Key  (r) = Recorded By, (t) = Taken  By, (c) = Cosigned By    Initials Name Provider Type    Holly Rivas PTA Physical Therapy Assistant               Outcome Measures     Row Name 11/05/21 1710          How much help from another person do you currently need...    Turning from your back to your side while in flat bed without using bedrails? 2  -JM     Moving from lying on back to sitting on the side of a flat bed without bedrails? 2  -JM     Moving to and from a bed to a chair (including a wheelchair)? 3  -JM     Standing up from a chair using your arms (e.g., wheelchair, bedside chair)? 2  -JM     Climbing 3-5 steps with a railing? 1  -JM     To walk in hospital room? 2  -JM     AM-PAC 6 Clicks Score (PT) 12  -           User Key  (r) = Recorded By, (t) = Taken By, (c) = Cosigned By    Initials Name Provider Type    Holly Rivas PTA Physical Therapy Assistant                             Physical Therapy Education                 Title: PT OT SLP Therapies (Done)     Topic: Physical Therapy (Done)     Point: Mobility training (Done)     Learning Progress Summary           Patient Acceptance, E,D, VU,NR by LIBERTAD at 11/5/2021 1710    Acceptance, E,TB,D, VU,NR by  at 11/4/2021 1341                   Point: Home exercise program (Done)     Learning Progress Summary           Patient Acceptance, E,D, VU,NR by LIBERTAD at 11/5/2021 1710                   Point: Body mechanics (Done)     Learning Progress Summary           Patient Acceptance, E,D, VU,NR by LIBERTAD at 11/5/2021 1710                   Point: Precautions (Done)     Learning Progress Summary           Patient Acceptance, E,D, VU,NR by LIBERTAD at 11/5/2021 1710                               User Key     Initials Effective Dates Name Provider Type Discipline     03/07/18 -  Holly Garcia PTA Physical Therapy Assistant PT     06/16/21 -  Allison Oswald PT Physical Therapist PT              PT Recommendation and Plan     Plan of Care Reviewed With: patient  Outcome Summary: Pt agreed  to PT , but c/o wkness, pain and stiffness in L leg; found pt in chair, pt amb 10ft x2 w/min assist, slow paced , constant cues to keep moving and for sequencing; pt req MOD assist to stand from recliner and elevated commode; plans SNU at [place] possibly tomorrow     Time Calculation:    PT Charges     Row Name 11/05/21 1711             Time Calculation    Start Time 1620  -      Stop Time 1656  -      Time Calculation (min) 36 min  -      PT Received On 11/05/21  -LIBERTAD      PT - Next Appointment 11/06/21  -LIBERTAD            User Key  (r) = Recorded By, (t) = Taken By, (c) = Cosigned By    Initials Name Provider Type    Holly Rivas PTA Physical Therapy Assistant              Therapy Charges for Today     Code Description Service Date Service Provider Modifiers Qty    [id number] HC PT THER PROC EA 15 MIN 11/5/2021 Holly Garcia PTA GP 2          PT G-Codes  Outcome Measure Options: AM-PAC 6 Clicks Basic Mobility (PT)  AM-PAC 6 Clicks Score (PT): 12    Holly Garcia PTA  11/5/2021

## 2021-11-05 NOTE — CASE MANAGEMENT/SOCIAL WORK
Continued Stay Note  [facility]     Patient Name: Samantha Davila  MRN: [id number]  Today's Date: 11/5/2021    Admit Date: 11/3/2021     Discharge Plan     Row Name 11/05/21 1019       Plan    Plan [facility] -- Accepted.    Patient/Family in Agreement with Plan yes    Plan Comments Spoke with [facility] who has accepted the patient and will have a SNF bed for her tomorrow. Patient will need either an ambulance or a wheelchair van to transport at d/c. Updated the patient's /Osgood who's agreeable. No other needs identified.               Discharge Codes    No documentation.               Expected Discharge Date and Time     Expected Discharge Date Expected Discharge Time    Nov 6, 2021             Alesia Coy RN

## 2021-11-06 LAB
ANION GAP SERPL CALCULATED.3IONS-SCNC: 8.6 MMOL/L (ref 5–15)
BUN SERPL-MCNC: 9 MG/DL (ref 8–23)
BUN/CREAT SERPL: 21.4 (ref 7–25)
CALCIUM SPEC-SCNC: 8.2 MG/DL (ref 8.6–10.5)
CHLORIDE SERPL-SCNC: 99 MMOL/L (ref 98–107)
CO2 SERPL-SCNC: 28.4 MMOL/L (ref 22–29)
CREAT SERPL-MCNC: 0.42 MG/DL (ref 0.57–1)
DEPRECATED RDW RBC AUTO: 39.6 FL (ref 37–54)
ERYTHROCYTE [DISTWIDTH] IN BLOOD BY AUTOMATED COUNT: 12.2 % (ref 12.3–15.4)
GFR SERPL CREATININE-BSD FRML MDRD: 145 ML/MIN/1.73
GLUCOSE SERPL-MCNC: 99 MG/DL (ref 65–99)
HCT VFR BLD AUTO: 29.8 % (ref 34–46.6)
HGB BLD-MCNC: 10.2 G/DL (ref 12–15.9)
MCH RBC QN AUTO: 30.8 PG (ref 26.6–33)
MCHC RBC AUTO-ENTMCNC: 34.2 G/DL (ref 31.5–35.7)
MCV RBC AUTO: 90 FL (ref 79–97)
PLATELET # BLD AUTO: 270 10*3/MM3 (ref 140–450)
PMV BLD AUTO: 10 FL (ref 6–12)
POTASSIUM SERPL-SCNC: 4 MMOL/L (ref 3.5–5.2)
RBC # BLD AUTO: 3.31 10*6/MM3 (ref 3.77–5.28)
SODIUM SERPL-SCNC: 136 MMOL/L (ref 136–145)
WBC # BLD AUTO: 8.93 10*3/MM3 (ref 3.4–10.8)

## 2021-11-06 PROCEDURE — 80048 BASIC METABOLIC PNL TOTAL CA: CPT | Performed by: INTERNAL MEDICINE

## 2021-11-06 PROCEDURE — 85027 COMPLETE CBC AUTOMATED: CPT | Performed by: INTERNAL MEDICINE

## 2021-11-06 RX ORDER — BISACODYL 10 MG
10 SUPPOSITORY, RECTAL RECTAL DAILY PRN
Status: DISCONTINUED | OUTPATIENT
Start: 2021-11-06 | End: 2021-11-07 | Stop reason: HOSPADM

## 2021-11-06 RX ORDER — ESCITALOPRAM OXALATE 5 MG/1
5 TABLET ORAL NIGHTLY
Start: 2021-11-06

## 2021-11-06 RX ORDER — HYDROCODONE BITARTRATE AND ACETAMINOPHEN 5; 325 MG/1; MG/1
1 TABLET ORAL EVERY 6 HOURS PRN
Qty: 12 TABLET | Refills: 0 | Status: SHIPPED | OUTPATIENT
Start: 2021-11-06 | End: 2021-12-14 | Stop reason: SDUPTHER

## 2021-11-06 RX ORDER — L.ACID,PARA/B.BIFIDUM/S.THERM 8B CELL
1 CAPSULE ORAL DAILY
Start: 2021-11-07

## 2021-11-06 RX ORDER — POLYETHYLENE GLYCOL 3350 17 G/17G
17 POWDER, FOR SOLUTION ORAL DAILY
Start: 2021-11-07

## 2021-11-06 RX ADMIN — HYDROCODONE BITARTRATE AND ACETAMINOPHEN 1 TABLET: 5; 325 TABLET ORAL at 15:36

## 2021-11-06 RX ADMIN — POLYETHYLENE GLYCOL 3350 17 G: 17 POWDER, FOR SOLUTION ORAL at 08:22

## 2021-11-06 RX ADMIN — ESCITALOPRAM 5 MG: 5 TABLET, FILM COATED ORAL at 20:53

## 2021-11-06 RX ADMIN — APIXABAN 2.5 MG: 2.5 TABLET, FILM COATED ORAL at 20:53

## 2021-11-06 RX ADMIN — HYDROCODONE BITARTRATE AND ACETAMINOPHEN 1 TABLET: 5; 325 TABLET ORAL at 08:22

## 2021-11-06 RX ADMIN — HYDROCODONE BITARTRATE AND ACETAMINOPHEN 1 TABLET: 5; 325 TABLET ORAL at 20:53

## 2021-11-06 RX ADMIN — Medication 1 CAPSULE: at 08:21

## 2021-11-06 RX ADMIN — APIXABAN 2.5 MG: 2.5 TABLET, FILM COATED ORAL at 08:21

## 2021-11-06 RX ADMIN — BUDESONIDE 9 MG: 3 CAPSULE, GELATIN COATED ORAL at 08:21

## 2021-11-06 RX ADMIN — SODIUM CHLORIDE, PRESERVATIVE FREE 10 ML: 5 INJECTION INTRAVENOUS at 20:54

## 2021-11-06 RX ADMIN — HYDROCODONE BITARTRATE AND ACETAMINOPHEN 1 TABLET: 5; 325 TABLET ORAL at 00:18

## 2021-11-06 RX ADMIN — MEMANTINE HYDROCHLORIDE 5 MG: 5 TABLET, FILM COATED ORAL at 08:21

## 2021-11-06 NOTE — DISCHARGE SUMMARY
Date of Admission: 11/3/2021  Date of Discharge:  11/6/2021  Primary Care Physician: Chang Borrego MD     Discharge Diagnosis:  Active Hospital Problems    Diagnosis  POA   • **Closed nondisplaced intertrochanteric fracture of left femur (HCC) [S72.145A]  Yes   • Episodic confusion [R41.0]  Yes   • Hyperlipidemia [E78.5]  Yes   • Colitis, collagenous [K52.831]  Yes   • Fall [W19.XXXA]  Yes   • Neurodermatitis [L28.0]  Yes      Resolved Hospital Problems   No resolved problems to display.       DETAILS OF HOSPITAL STAY     Pertinent Test Results and Procedures Performed    X-ray of left hip and pelvis showed intertrochanteric fracture of the left femur    Brain MRI:  1. No acute intracranial abnormality is seen.   2. There is moderate small vessel disease in the cerebral white matter   and there is diffuse cerebral atrophy and the lateral and third   ventricles are prominent in size, felt to be on the basis of central   volume loss or atrophy. The remainder of the MRI of the head is within   normal limits.   3. Incidental note is made of severe arthritic changes at the   articulation of the left lateral masses of C1 and C2 with joint space   narrowing, exuberant marginal spurring and degenerative marrow edema and   enhancement in the left lateral masses of C1 and C2.     Hospital Course  This is an 81-year-old female who presented to the emergency room after suffering a fall at home and was found to have a left intertrochanteric hip fracture.  Please see H&P for full details of admission.  She underwent left IM nailing.  Postoperatively she did have some excessive confusion on top of underlying dementia.  She was evaluated by neurology.  Brain MRI was obtained and unremarkable as described above for any acute changes.  I spoke with her  about the fact that she has had progressive cognitive decline over the past several months.  I recommended to him the undergo neurocognitive testing in the outpatient  setting.  She has done well from an operative standpoint.  She is now pleasantly confused but calm and cooperative.  She has a bed available at rehab today and will transition there in stable condition.    Physical Exam at Discharge:  General: No acute distress, AAOx3  HEENT: EOMI, PERRL  Cardiovascular: +s1 and s2, RRR  Lungs: No rhonchi or wheezing  Abdomen: soft, nontender    Consults:   Consults     Date and Time Order Name Status Description    11/3/2021 12:25 PM Inpatient Neurology Consult General Completed     11/3/2021  2:05 AM Inpatient Orthopedic Surgery Consult Completed     11/3/2021  1:53 AM Ortho (on-call MD unless specified) Completed     11/3/2021  1:51 AM [facility] (on-call MD unless specified) Details Completed             Condition on Discharge: Stable, improved    Discharge Disposition  Skilled Nursing Facility (DC - External)    Discharge Medications     Discharge Medications      New Medications      Instructions Start Date   apixaban 2.5 MG tablet tablet  Commonly known as: ELIQUIS   2.5 mg, Oral, Every 12 Hours Scheduled      escitalopram 5 MG tablet  Commonly known as: LEXAPRO   5 mg, Oral, Nightly      HYDROcodone-acetaminophen 5-325 MG per tablet  Commonly known as: NORCO   1 tablet, Oral, Every 6 Hours PRN      polyethylene glycol 17 g packet  Commonly known as: MIRALAX   17 g, Oral, Daily   Start Date: November 7, 2021        Changes to Medications      Instructions Start Date   lactobacillus acidophilus capsule capsule  What changed:   · how much to take  · when to take this   1 capsule, Oral, Daily   Start Date: November 7, 2021        Continue These Medications      Instructions Start Date   Budesonide 3 MG 24 hr capsule  Commonly known as: ENTOCORT EC   TAKE 3 CS PO QD      memantine 5 MG tablet  Commonly known as: NAMENDA   5 mg, Oral, Daily      multivitamin with minerals tablet tablet   Oral      Vitamin D3 50 MCG (2000 UT) tablet   Oral         Stop These Medications    IMODIUM PO      WAL-ZYR D PO     zolpidem 5 MG tablet  Commonly known as: AMBIEN            Discharge Diet:   Diet Instructions     Diet: Regular      Discharge Diet: Regular          Activity at Discharge:   Activity Instructions     Activity as Tolerated            Follow-up Appointments  Future Appointments   Date Time Provider Department Center   11/24/2021  1:30 PM All Mistry MD [facility]   12/6/2021  2:00 PM Ki Wang MD [facility]   4/6/2022 11:00 AM [facility]   4/13/2022  2:00 PM Chang Borrego MD [facility]     Additional Instructions for the Follow-ups that You Need to Schedule     Discharge Follow-up with PCP   As directed       Currently Documented PCP:    Chang Borrego MD    PCP Phone Number:    [phone]     Follow Up Details: 1 week         Discharge Follow-up with Specified Provider: Dr. Jayjay Gillespie of orthopedic surgery in 2 weeks   As directed      To: Dr. Jayjay Gillespie of orthopedic surgery in 2 weeks             I have examined and discussed discharge planning with the patient today.    I wore full protective equipment throughout the patient encounter including eye protection and facemask.  Hand hygiene was performed before donning protective equipment and after removal when leaving the room.     Geovanni Brar MD  11/06/21  09:49 EDT    Time: Discharge greater than 30 min

## 2021-11-06 NOTE — PLAN OF CARE
Goal Outcome Evaluation:  Plan of Care Reviewed With: patient        Progress: improving  Outcome Summary: Pt remains stable. Confused at times and forgetful, easy to reorient. Ambulates with walker use and 1 assist. Voiding adequately. No BM, was given miralax in the AM. Norco given for pain with relief. No behaviorial issues, pt cooperative with all care. Skin protection measures in place. Eliquis started for dvt prevention. Plans to d/c to rehab today. WCTM.

## 2021-11-06 NOTE — CASE MANAGEMENT/SOCIAL WORK
Continued Stay Note  [facility]     Patient Name: Samantha Davila  MRN: [id number]  Today's Date: 11/6/2021    Admit Date: 11/3/2021     Discharge Plan     Row Name 11/06/21 1427       Plan    Plan Discharge to [facility], [facility] to transport.    Provided Post Acute Provider List? N/A    Provided Post Acute Provider Quality & Resource List? N/A    Patient/Family in Agreement with Plan unable to assess    Plan Comments CCP received call from patient’s nurse, patient has discharge orders. CCP spoke with Gianni confirmed bed is ready at the [facility] room 142. CCP spoke with [email or web address] CCP no trucks in the [place] area this weekend. CCP spoke with [email or web address] no transportation available this weekend. CCP spoke with [email or web address] no transport available today (11/6/2021) however scheduled for transport at 0730 tomorrow (11/7/2021). CCP updated patient’s nurse. CCP to follow. Kayley Schafer RN.               Discharge Codes    No documentation.               Expected Discharge Date and Time     Expected Discharge Date Expected Discharge Time    Nov 6, 2021             Polly Schafer RN

## 2021-11-06 NOTE — PLAN OF CARE
Goal Outcome Evaluation:  Plan of Care Reviewed With: patient           Outcome Summary: Pt remains stable, awaiting transfer to rehab scheduled for 11/7. Patient is alert, oriented, and pleasant. Patient pain is well controlled with pain medications. Patient has been up to chair throughout day and has ambulated in room with walker and assistance. Skin protection and turning measures in place. Patient on Eliquid for DVT prevention.   No

## 2021-11-07 VITALS
HEIGHT: 66 IN | BODY MASS INDEX: 18.96 KG/M2 | WEIGHT: 118 LBS | HEART RATE: 74 BPM | RESPIRATION RATE: 16 BRPM | DIASTOLIC BLOOD PRESSURE: 71 MMHG | TEMPERATURE: 97.3 F | OXYGEN SATURATION: 96 % | SYSTOLIC BLOOD PRESSURE: 115 MMHG

## 2021-11-07 RX ADMIN — HYDROCODONE BITARTRATE AND ACETAMINOPHEN 1 TABLET: 5; 325 TABLET ORAL at 01:50

## 2021-11-07 RX ADMIN — HYDROCODONE BITARTRATE AND ACETAMINOPHEN 1 TABLET: 5; 325 TABLET ORAL at 07:21

## 2021-11-09 NOTE — CASE MANAGEMENT/SOCIAL WORK
Case Management Discharge Note      Final Note: [facility].    Provided Post Acute Provider List?: N/A  N/A Provider List Comment: Requests [facility].  Provided Post Acute Provider Quality & Resource List?: N/A    Selected Continued Care - Discharged on 11/7/2021 Admission date: 11/3/2021 - Discharge disposition: Skilled Nursing Facility (DC - External)    Destination Coordination complete.    Service Provider Selected Services Address Phone Fax Patient Preferred    [facility] [address] [address] [phone] --          Durable Medical Equipment    No services have been selected for the patient.              Dialysis/Infusion    No services have been selected for the patient.              Home Medical Care    No services have been selected for the patient.              Therapy    No services have been selected for the patient.              Community Resources    No services have been selected for the patient.              Community & DME    No services have been selected for the patient.                Selected Continued Care - Episodes Includes selections from active Coordinated Care Management episodes    High Risk Care Management Episode start date: 11/8/2021 (Paused)   There are no active outsourced providers for this episode.                    Final Discharge Disposition Code: 03 - skilled nursing facility (SNF)

## 2021-12-08 ENCOUNTER — TELEPHONE (OUTPATIENT)
Dept: FAMILY MEDICINE CLINIC | Facility: CLINIC | Age: 81
End: 2021-12-08

## 2021-12-08 NOTE — TELEPHONE ENCOUNTER
NA AT HOME WOULD LIKE VERBAL ORDERS FOR PHYSICAL THERAPY WITH THE PATIENT - TWICE A WEEK FOR 4 WEEKS, THEN ONCE A WEEK FOR 4 WEEKS. PLEASE ADVISE BY CALLING [phone].

## 2021-12-10 ENCOUNTER — PATIENT OUTREACH (OUTPATIENT)
Dept: CASE MANAGEMENT | Facility: OTHER | Age: 81
End: 2021-12-10

## 2021-12-10 ENCOUNTER — TELEPHONE (OUTPATIENT)
Dept: FAMILY MEDICINE CLINIC | Facility: CLINIC | Age: 81
End: 2021-12-10

## 2021-12-10 NOTE — TELEPHONE ENCOUNTER
PT WAS JUST RELEASED FROM [facility]. PT HAD A HIP REPLACEMENT.  PT DID NOT RECEIVE ANY PAIN MEDICATIONS.    PT IS REQUESTING PAIN MEDICATION.        [id number] - [place], [place] - [address] AT [facility] [address] - [phone] PH - [phone] FX       PT # [phone]

## 2021-12-10 NOTE — OUTREACH NOTE
Ambulatory Case Management Note  Patient Outreach  Talked with patient's spouse. Patient discharged home from rehab on 12/3/21 following 11/3/21 to 11/7/21 hospitalization regarding fracture of left femur and left IM nailing. Patient receiving home health services and [facility] services 2 times per week who assist with caregiver services. Spouse states patient discharged with medications except for pain medication; is having hip pain and has contacted PCP for recommendations. Spouse requests RN-ACM assistance in contacting PCP. Spouse states patient has no difficulty with chest pain; SOB; fever; appetite or sleeping.  Spouse  states to appreciate phone call.  No further questions or concerns voiced at this time.   Care Coordination  Care Coordination with PCP office regarding pain medications and patient outreach.  Talked with Rahul who states message received earlier from patient's spouse and will follow up.   Care Coordination  Care Coordination with [place] [phone] . States patient current; receiving PT: OT home health services with start of care 12/8/21 with PTA visit today.   Patient Outreach  Relayed information regarding PCP outreach to patient's spouse. Spouse verbalized understanding and appreciates assistance. RN-ACM contact information provided. No further questions voiced at this time.  General & Health Literacy Assessment    Questions/Answers      Most Recent Value   Assessment Completed With Spouse or Significant Other   Living Arrangement Spouse  [Patient lives with spouse,  receiving assistance as needed with activities,   transportation and ambulation. Spouse has macular degeneration and transportation provided through [facility]. Patient ambulating  with walker. ]   Type of Residence Private Residence   Home Care Services Yes  [[facility] [phone]. Has [facility] services]   Bed or Wheelchair Confined No   Difficulty Keeping  Appointments No        Care Evaluation    Questions/Answers      Most Recent Value   Annual Wellness Visit:  Patient Has Completed   Care Gaps Addressed Other (See Comment),  Pneumonia Vaccine,  Flu Shot  [COVID 19 vaccine and booster completed]   Flu Shot Status Up to Date   Pneumonia Vaccine Status Up to Date   Other Patient Education/Resources  Advanced Care Planning,  MyChart  [Reviewed with spouse,  education,  home health services,  fall and safety precautions,   COVID 19 precautions,   ACM contact information,  Advance Directives,  My Chart,  gaps in care,  AWV and Case Management services. Spouse verbalized understanding]   ACP Education Method Verbal   MyChart Education Method Verbal  [Active]   Advanced Directives: Patient Has   Medication Adherence Medications understood   Healthy Lifestyle (Self-Efficacy) recognizes when to contact medical assistance,  self-reports important symptoms to medical professional  [Spouse assists patient]      SDOH updated and reviewed with the patient during this program:     Financial Resource Strain: Low Risk    • Difficulty of Paying Living Expenses: Not hard at all       Food Insecurity: No Food Insecurity   • Worried About Running Out of Food in the Last Year: Never true   • Ran Out of Food in the Last Year: Never true       Transportation Needs: No Transportation Needs   • Lack of Transportation (Medical): No   • Lack of Transportation (Non-Medical): No        Maribell Messer RN  Ambulatory Case Management    12/10/2021, 11:14 EST

## 2021-12-14 ENCOUNTER — TELEPHONE (OUTPATIENT)
Dept: FAMILY MEDICINE CLINIC | Facility: CLINIC | Age: 81
End: 2021-12-14

## 2021-12-14 DIAGNOSIS — S72.145A CLOSED NONDISPLACED INTERTROCHANTERIC FRACTURE OF LEFT FEMUR, INITIAL ENCOUNTER (HCC): ICD-10-CM

## 2021-12-14 RX ORDER — HYDROCODONE BITARTRATE AND ACETAMINOPHEN 5; 325 MG/1; MG/1
1 TABLET ORAL EVERY 6 HOURS PRN
Qty: 30 TABLET | Refills: 0 | Status: SHIPPED | OUTPATIENT
Start: 2021-12-14 | End: 2021-12-14 | Stop reason: SDUPTHER

## 2021-12-14 RX ORDER — HYDROCODONE BITARTRATE AND ACETAMINOPHEN 5; 325 MG/1; MG/1
1 TABLET ORAL EVERY 6 HOURS PRN
Qty: 30 TABLET | Refills: 0 | Status: SHIPPED | OUTPATIENT
Start: 2021-12-14 | End: 2022-01-11 | Stop reason: SDUPTHER

## 2022-01-06 ENCOUNTER — PATIENT OUTREACH (OUTPATIENT)
Dept: CASE MANAGEMENT | Facility: OTHER | Age: 82
End: 2022-01-06

## 2022-01-06 NOTE — OUTREACH NOTE
Ambulatory Case Management Note  Patient Outreach  Talked with patient. Patient continues with home health services; [facility] services;  ambulating with cane/ walker; compliant with medications and medical appointments. Patient states improvement regarding pain and no difficulty with fever; chest pain; SOB; appetite or sleeping. Patient will follow up with PCP and schedule appointment. Reviewed with patient education; medical appointments; medications; care gaps and 24/7 Nurse Line Telephone number. Patient verbalized understanding and states to appreciate patient outreach. No further questions or concerns voiced at this time.     Maribell Messer RN  Ambulatory Case Management    1/6/2022, 16:25 EST

## 2022-01-11 DIAGNOSIS — S72.145A CLOSED NONDISPLACED INTERTROCHANTERIC FRACTURE OF LEFT FEMUR, INITIAL ENCOUNTER: ICD-10-CM

## 2022-01-11 NOTE — TELEPHONE ENCOUNTER
Caller: Samantha Davila    Relationship: Self    Best call back number: [phone]    Requested Prescriptions:   Requested Prescriptions     Pending Prescriptions Disp Refills   • HYDROcodone-acetaminophen (NORCO) 5-325 MG per tablet 30 tablet 0     Sig: Take 1 tablet by mouth Every 6 (Six) Hours As Needed for Moderate Pain  or Severe Pain .        Pharm[facility] request [facility] sent[id number] [place], [place] - [address] AT [facility] [address] - [phone]  - [phone] FX     Additional details provided by patient: OUT     Does the patient have less than a 3 day supply:  [x] Yes  [] No    Chary Calvert Rep   01/11/22 13:53 EST

## 2022-01-12 RX ORDER — HYDROCODONE BITARTRATE AND ACETAMINOPHEN 5; 325 MG/1; MG/1
1 TABLET ORAL EVERY 6 HOURS PRN
Qty: 30 TABLET | Refills: 0 | Status: SHIPPED | OUTPATIENT
Start: 2022-01-12 | End: 2022-02-22 | Stop reason: SDUPTHER

## 2022-02-22 ENCOUNTER — OFFICE VISIT (OUTPATIENT)
Dept: FAMILY MEDICINE CLINIC | Facility: CLINIC | Age: 82
End: 2022-02-22

## 2022-02-22 ENCOUNTER — HOSPITAL ENCOUNTER (OUTPATIENT)
Dept: GENERAL RADIOLOGY | Facility: HOSPITAL | Age: 82
Discharge: HOME | End: 2022-02-22

## 2022-02-22 VITALS
OXYGEN SATURATION: 97 % | HEIGHT: 66 IN | DIASTOLIC BLOOD PRESSURE: 72 MMHG | RESPIRATION RATE: 18 BRPM | WEIGHT: 126.2 LBS | HEART RATE: 73 BPM | SYSTOLIC BLOOD PRESSURE: 120 MMHG | TEMPERATURE: 96.4 F | BODY MASS INDEX: 20.28 KG/M2

## 2022-02-22 DIAGNOSIS — M17.12 OSTEOARTHRITIS OF LEFT KNEE, UNSPECIFIED OSTEOARTHRITIS TYPE: ICD-10-CM

## 2022-02-22 DIAGNOSIS — M25.562 ACUTE PAIN OF LEFT KNEE: ICD-10-CM
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
## 2022-02-22 DIAGNOSIS — S72.145A CLOSED NONDISPLACED INTERTROCHANTERIC FRACTURE OF LEFT FEMUR, INITIAL ENCOUNTER: ICD-10-CM

## 2022-02-22 DIAGNOSIS — M25.572 ACUTE LEFT ANKLE PAIN: ICD-10-CM

## 2022-02-22 DIAGNOSIS — M76.52 PATELLAR TENDINITIS OF LEFT KNEE: Primary | ICD-10-CM

## 2022-02-22 PROCEDURE — 73560 X-RAY EXAM OF KNEE 1 OR 2: CPT

## 2022-02-22 PROCEDURE — 99213 OFFICE O/P EST LOW 20 MIN: CPT | Performed by: NURSE PRACTITIONER

## 2022-02-22 PROCEDURE — 73610 X-RAY EXAM OF ANKLE: CPT

## 2022-02-22 RX ORDER — ZOLPIDEM TARTRATE 5 MG/1
5 TABLET ORAL NIGHTLY PRN
COMMUNITY
Start: 2022-02-07 | End: 2022-05-02

## 2022-02-22 NOTE — TELEPHONE ENCOUNTER
Caller: John Davila    Relationship: Emergency Contact    Best call back number: [phone]    Requested Prescriptions:   Requested Prescriptions     Pending Prescriptions Disp Refills   • HYDROcodone-acetaminophen (NORCO) 5-325 MG per tablet 30 tablet 0     Sig: Take 1 tablet by mouth Every 6 (Six) Hours As Needed for Moderate Pain  or Severe Pain .        Pharmacy where request should be sent: [facility] #[id number] [place], [place] - [address] AT [facility] [address] - [phone]  - [phone] FX     Chary Martin Rep   02/22/22 14:53 EST

## 2022-02-22 NOTE — PROGRESS NOTES
"Subjective     Samantha Davila is a 82 y.o.. female.     Pt here today for c/o left ankle and left knee pain s/p fall in bedroom last November. Pt having fell in her bedroom in november 2021, taken to ER where she was diagnosed with left femur fracture and having surgery by Dr. Gillespie on 11/3/21. Pt stating she has gone through physical therapy and it is completed now. Pt is using her walker to ambulate in and outside of home. Pt stating she has chronic right knee pain.       The following portions of the patient's history were reviewed and updated as appropriate: allergies, current medications, past family history, past medical history, past social history, past surgical history and problem list.    Past Medical History:   Diagnosis Date   • Arthritis    • Asthma    • Colitis, collagenous    • Colon polyp    • Insomnia        Past Surgical History:   Procedure Laterality Date   • BACK SURGERY     • FEMUR IM NAILING/RODDING Left 11/3/2021    Procedure: FEMUR INTRAMEDULLARY NAILING/RODDING;  Surgeon: Michael Gillespie II, MD;  Location: [facility];  Service: Orthopedics;  Laterality: Left;  [place] Table   • ROTATOR CUFF REPAIR Left    • TONSILLECTOMY     • TOTAL HIP ARTHROPLASTY Right 3/4/2019    Procedure: TOTAL HIP ARTHROPLASTY ANTERIOR WITH HANA TABLE;  Surgeon: Michael Gillespie II, MD;  Location: [facility];  Service: Orthopedics       Review of Systems   Constitutional: Negative.    Respiratory: Negative.    Cardiovascular: Negative.    Musculoskeletal: Positive for arthralgias (left knee and ankle pain).       Allergies   Allergen Reactions   • Quinine Derivatives    • Sulfa Antibiotics        Objective     Vitals:    02/22/22 1121   BP: 120/72   Pulse: 73   Resp: 18   Temp: 96.4 °F (35.8 °C)   TempSrc: Oral   SpO2: 97%   Weight: 57.2 kg (126 lb 3.2 oz)   Height: 167.6 cm (65.98\")     Body mass index is 20.38 kg/m².    Physical Exam  Vitals reviewed.   HENT:      Head: Normocephalic.      " Comments: [place]  Eyes:      Pupils: Pupils are equal, round, and reactive to light.   Cardiovascular:      Rate and Rhythm: Normal rate and regular rhythm.      Pulses: Normal pulses.   Pulmonary:      Effort: Pulmonary effort is normal.      Breath sounds: Normal breath sounds.   Musculoskeletal:      Left knee: Swelling (minor) present. No erythema, lacerations, bony tenderness or crepitus. Normal range of motion. Tenderness present over the patellar tendon.      Right lower leg: Edema present.      Left lower leg: Edema present.      Left ankle: No ecchymosis or lacerations. No tenderness. Normal range of motion. Normal pulse.   Neurological:      Mental Status: She is alert.   Psychiatric:      Comments: Forgetful during exam           Current Outpatient Medications:   •  apixaban (ELIQUIS) 2.5 MG tablet tablet, Take 1 tablet by mouth Every 12 (Twelve) Hours. Indications: Prevention of Unwanted Clot in Veins, Disp: 60 tablet, Rfl:   •  Budesonide (ENTOCORT EC) 3 MG 24 hr capsule, TAKE 3 CS PO QD, Disp: , Rfl: 12  •  Cholecalciferol (VITAMIN D3) 2000 units tablet, Take  by mouth., Disp: , Rfl:   •  escitalopram (LEXAPRO) 5 MG tablet, Take 1 tablet by mouth Every Night., Disp: , Rfl:   •  HYDROcodone-acetaminophen (NORCO) 5-325 MG per tablet, Take 1 tablet by mouth Every 6 (Six) Hours As Needed for Moderate Pain  or Severe Pain ., Disp: 30 tablet, Rfl: 0  •  lactobacillus acidophilus (RISAQUAD) capsule capsule, Take 1 capsule by mouth Daily., Disp: , Rfl:   •  memantine (NAMENDA) 5 MG tablet, Take 1 tablet by mouth Daily., Disp: 30 tablet, Rfl: 0  •  Multiple Vitamins-Minerals (WOMENS 50+ MULTI VITAMIN/MIN PO), Take  by mouth., Disp: , Rfl:   •  polyethylene glycol (polyethylene glycol) 17 g packet, Take 17 g by mouth Daily., Disp: , Rfl:   •  zolpidem (AMBIEN) 5 MG tablet, Take 5 mg by mouth At Night As Needed., Disp: , Rfl:         Assessment/Plan   Diagnoses and all orders for this visit:    1. Patellar  tendinitis of left knee (Primary)    2. Osteoarthritis of left knee, unspecified osteoarthritis type    3. Acute left ankle pain  -     XR Ankle 3+ View Left; Future  -     Ambulatory Referral to Orthopedic Surgery    4. Acute pain of left knee  -     XR Knee 1 or 2 View Left; Future  -     Ambulatory Referral to Orthopedic Surgery    5. Fall, subsequent encounter  -     Ambulatory Referral to Orthopedic Surgery        Patient Instructions   Rest left ankle and knee. Use Ice/cool compress and heating pad/warm compress 10-15 minutes each 3-4 times a day; Use soft brace to left knee when up and moving; keep elevated as much as possible through out day. May use otc tylenol prn pain. Follow up with Dr. Gillespie, orthopedic.          Return if symptoms worsen or fail to improve, for Dr. Borrego as needed/as recommended.

## 2022-02-23 PROBLEM — M25.572 ACUTE LEFT ANKLE PAIN: Status: RESOLVED | Noted: 2022-02-23 | Resolved: 2022-02-23

## 2022-02-23 PROBLEM — M17.12 OSTEOARTHRITIS OF LEFT KNEE: Status: ACTIVE | Noted: 2022-02-23

## 2022-02-23 PROBLEM — M25.562 ACUTE PAIN OF LEFT KNEE: Status: RESOLVED | Noted: 2022-02-23 | Resolved: 2022-02-23

## 2022-02-23 PROBLEM — M25.572 ACUTE LEFT ANKLE PAIN: Status: ACTIVE | Noted: 2022-02-23

## 2022-02-23 PROBLEM — M25.562 ACUTE PAIN OF LEFT KNEE: Status: ACTIVE | Noted: 2022-02-23

## 2022-02-23 RX ORDER — HYDROCODONE BITARTRATE AND ACETAMINOPHEN 5; 325 MG/1; MG/1
1 TABLET ORAL EVERY 6 HOURS PRN
Qty: 30 TABLET | Refills: 0 | Status: SHIPPED | OUTPATIENT
Start: 2022-02-23

## 2022-02-23 NOTE — PATIENT INSTRUCTIONS
Rest left ankle and knee. Use Ice/cool compress and heating pad/warm compress 10-15 minutes each 3-4 times a day; Use soft brace to left knee when up and moving; keep elevated as much as possible through out day. May use otc tylenol prn pain. Follow up with Dr. Gillespie, orthopedic.

## 2022-02-23 NOTE — TELEPHONE ENCOUNTER
Rx Refill Note  Requested Prescriptions     Pending Prescriptions Disp Refills   • HYDROcodone-acetaminophen (NORCO) 5-325 MG per tablet 30 tablet 0     Sig: Take 1 tablet by mouth Every 6 (Six) Hours As Needed for Moderate Pain  or Severe Pain .      Last office visit with prescribing clinician: 9/29/2021      Next office visit with prescribing clinician: 5/5/2022            Mariela Núñez MA  02/23/22, 07:41 EST

## 2022-04-01 DIAGNOSIS — E78.5 HYPERLIPIDEMIA, UNSPECIFIED HYPERLIPIDEMIA TYPE: Primary | ICD-10-CM

## 2022-04-01 DIAGNOSIS — M81.0 AGE-RELATED OSTEOPOROSIS WITHOUT CURRENT PATHOLOGICAL FRACTURE: ICD-10-CM

## 2022-04-06 NOTE — CASE MANAGEMENT/SOCIAL WORK
"Physicians Statement of Medical Necessity for  Ambulance Transportation    GENERAL INFORMATION     Name: Samantha Davila  YOB: 1940  Medicare #: [id number]  Transport Date: 11/7/2021  Origin: [facility], [address] [place]  Destination: [facility], [address] [place] [address]. [facility] Room [address] [place] [address] [phone]  Is the Patient's stay covered under Medicare Part A (PPS/DRG?)YES  Closest appropriate facility? YES  If this a hosp-hosp transfer? NO  Is this a hospice patient? NO    MEDICAL NECESSITY QUESTIONAIRE    Ambulance Transportation is medically necessary only if other means of transportation are contraindicated or would be potentially harmful to the patient.  To meet this requirement, the patient must be either \"bed confined\" or suffer from a condition such that transport by means other than an ambulance is contraindicated by the patient's condition.  The following questions must be answered by the healthcare professional signing below for this form to be valid:     1) Describe the MEDICAL CONDITION (physical and/or mental) of this patient AT THE TIME OF AMBULANCE TRANSPORT that requires the patient to be transported in an ambulance, and why transport by other means is contraindicated by the patient's condition: closed nondisplaced intertrochanteric fracture of left femur, confusion  Past Medical History:   Diagnosis Date   • Arthritis    • Asthma    • Colitis, collagenous    • Colon polyp    • Insomnia       Past Surgical History:   Procedure Laterality Date   • BACK SURGERY     • FEMUR IM NAILING/RODDING Left 11/3/2021    Procedure: FEMUR INTRAMEDULLARY NAILING/RODDING;  Surgeon: Michael Gillespie II, MD;  Location: [facility];  Service: Orthopedics;  Laterality: Left;  [place] Table   • ROTATOR CUFF REPAIR Left    • TONSILLECTOMY     • TOTAL HIP ARTHROPLASTY Right 3/4/2019    Procedure: TOTAL HIP ARTHROPLASTY ANTERIOR WITH HANA TABLE; " " Surgeon: Michael Gillespie II, MD;  Location: [facility];  Service: Orthopedics      2) Is this patient \"bed confined\" as defined below?NO   To be \"bed confined\" the patient must satisfy all three of the following criteria:  (1) unable to get up from bed without assistance; AND (2) unable to ambulate;  AND (3) unable to sit in a chair or wheelchair.  3) Can this patient safely be transported by car or wheelchair van (I.e., may safely sit during transport, without an attendant or monitoring?)NO  4. In addition to completing questions 1-3 above, please check any of the following conditions that apply:            SIGNATURE OF PHYSICIAN OR OTHER AUTHORIZED HEALTHCARE PROFESSIONAL    I certify that the above information is true and correct based on my evaluation of this patient, and represent that the patient requires transport by ambulance and that other forms of transport are contraindicated.  I understand that this information will be used by the Centers for Medicare and Medicaid Services (CMS) to support the determiniation of medical necessity for ambulance services, and I represent that I have personal knowledge of the patient's condition at the time of transport.       If this box is checked, I also certify that the patient is physically or mentally incapable of signing the ambulance service's claim form and that the institution with which I am affiliated has furnished care, services or assistance to the patient.  My signature below is made on behalf of the patient pursuant to 42 .36(b)(4). In accordance with 42 .37, the specific reason(s) that the patient is physically or mentally incapable of signing the claim for is as follows:     Signature of Physician or Healthcare Professional  Date/Time:   11/6/2021 1431     (For Scheduled repetitive transport, this form is not valid for transports performed more than 60 days after this date).                                                         "                                                                                    --------------------------------------------------------------------------------------------  Printed Name and Credentials of Physician or Authorized Healthcare Professional     *Form must be signed by patient's attending physician for scheduled, repetitive transports,.  For non-repetitive ambulance transports, if unable to obtain the signature of the attending physician, any of the following may sign (please select below):     Physician  Clinical Nurse Specialist  Registered Nurse     Physician Assistant  Discharge Planner  Licensed Practical Nurse     Nurse Practitioner           Suturegard Body: The suture ends were repeatedly re-tightened and re-clamped to achieve the desired tissue expansion.

## 2022-05-02 NOTE — TELEPHONE ENCOUNTER
Rx Refill Note  Requested Prescriptions     Pending Prescriptions Disp Refills   • zolpidem (AMBIEN) 5 MG tablet [Pharmacy Med Name: ZOLPIDEM 5MG TABLETS] 30 tablet 2     Sig: TAKE 1 TABLET BY MOUTH AT NIGHT AS NEEDED FOR SLEEP      Last office visit with prescribing clinician: 9/29/2021      Next office visit with prescribing clinician: 5/5/2022            Mariela Núñez MA  05/02/22, 13:41 EDT

## 2022-05-03 RX ORDER — ZOLPIDEM TARTRATE 5 MG/1
TABLET ORAL
Qty: 30 TABLET | Refills: 2 | Status: SHIPPED | OUTPATIENT
Start: 2022-05-03 | End: 2022-08-03

## 2022-05-04 ENCOUNTER — TELEHEALTH PROVIDED IN PATIENT'S HOME (OUTPATIENT)
Dept: URBAN - METROPOLITAN AREA TELEHEALTH 5 | Facility: TELEHEALTH | Age: 82
End: 2022-05-04

## 2022-05-04 VITALS — WEIGHT: 124 LBS | HEIGHT: 63 IN

## 2022-05-04 DIAGNOSIS — K52.89 OTHER SPECIFIED NONINFECTIVE GASTROENTERITIS AND COLITIS: ICD-10-CM

## 2022-05-04 DIAGNOSIS — R19.7 DIARRHEA, UNSPECIFIED: ICD-10-CM

## 2022-05-04 PROCEDURE — 99214 OFFICE O/P EST MOD 30 MIN: CPT | Performed by: INTERNAL MEDICINE

## 2022-05-04 RX ORDER — BUDESONIDE 3 MG/1
CAPSULE, GELATIN COATED ORAL
Qty: 60 | Refills: 0 | Status: ACTIVE

## 2022-05-05 ENCOUNTER — OFFICE VISIT (OUTPATIENT)
Dept: FAMILY MEDICINE CLINIC | Facility: CLINIC | Age: 82
End: 2022-05-05

## 2022-05-05 VITALS
BODY MASS INDEX: 20.22 KG/M2 | SYSTOLIC BLOOD PRESSURE: 120 MMHG | HEART RATE: 97 BPM | HEIGHT: 66 IN | DIASTOLIC BLOOD PRESSURE: 74 MMHG | RESPIRATION RATE: 18 BRPM | TEMPERATURE: 97.3 F | OXYGEN SATURATION: 100 % | WEIGHT: 125.8 LBS

## 2022-05-05 DIAGNOSIS — E78.5 HYPERLIPIDEMIA, UNSPECIFIED HYPERLIPIDEMIA TYPE: Primary | ICD-10-CM

## 2022-05-05 DIAGNOSIS — F51.01 PRIMARY INSOMNIA: ICD-10-CM

## 2022-05-05 DIAGNOSIS — M81.0 AGE-RELATED OSTEOPOROSIS WITHOUT CURRENT PATHOLOGICAL FRACTURE: ICD-10-CM

## 2022-05-05 DIAGNOSIS — R41.0 EPISODIC CONFUSION: ICD-10-CM

## 2022-05-05 PROCEDURE — 99213 OFFICE O/P EST LOW 20 MIN: CPT | Performed by: INTERNAL MEDICINE

## 2022-05-05 NOTE — PROGRESS NOTES
Subjective   Samantha Davila is a 82 y.o. female. Patient is here today for follow-up on her hyperlipidemia, history of osteoporosis, occasional mild confusion and insomnia.  Patient's generally stable and has no acute complaints  Chief Complaint   Patient presents with   • Results     PT HERE FOR FOLLOW UP ON LABS          Vitals:    05/05/22 1053   BP: 120/74   Pulse: 97   Resp: 18   Temp: 97.3 °F (36.3 °C)   SpO2: 100%     Body mass index is 20.31 kg/m².  The following portions of the patient's history were reviewed and updated as appropriate: allergies, current medications, past family history, past medical history, past social history, past surgical history and problem list.    Past Medical History:   Diagnosis Date   • Arthritis    • Asthma    • Colitis, collagenous    • Colon polyp    • Insomnia       Allergies   Allergen Reactions   • Quinine Derivatives    • Sulfa Antibiotics       Social History     Socioeconomic History   • Marital status:    Tobacco Use   • Smoking status: Never Smoker   • Smokeless tobacco: Never Used   Vaping Use   • Vaping Use: Never used   Substance and Sexual Activity   • Alcohol use: Yes   • Drug use: Defer   • Sexual activity: Defer        Current Outpatient Medications:   •  apixaban (ELIQUIS) 2.5 MG tablet tablet, Take 1 tablet by mouth Every 12 (Twelve) Hours. Indications: Prevention of Unwanted Clot in Veins, Disp: 60 tablet, Rfl:   •  Budesonide (ENTOCORT EC) 3 MG 24 hr capsule, TAKE 3 CS PO QD, Disp: , Rfl: 12  •  Cholecalciferol (VITAMIN D3) 2000 units tablet, Take  by mouth., Disp: , Rfl:   •  escitalopram (LEXAPRO) 5 MG tablet, Take 1 tablet by mouth Every Night., Disp: , Rfl:   •  HYDROcodone-acetaminophen (NORCO) 5-325 MG per tablet, Take 1 tablet by mouth Every 6 (Six) Hours As Needed for Moderate Pain  or Severe Pain ., Disp: 30 tablet, Rfl: 0  •  lactobacillus acidophilus (RISAQUAD) capsule capsule, Take 1 capsule by mouth Daily., Disp: , Rfl:   •  Multiple  Vitamins-Minerals (WOMENS 50+ MULTI VITAMIN/MIN PO), Take  by mouth., Disp: , Rfl:   •  polyethylene glycol (polyethylene glycol) 17 g packet, Take 17 g by mouth Daily., Disp: , Rfl:   •  zolpidem (AMBIEN) 5 MG tablet, TAKE 1 TABLET BY MOUTH AT NIGHT AS NEEDED FOR SLEEP, Disp: 30 tablet, Rfl: 2     Objective     History of Present Illness     Review of Systems   All other systems reviewed and are negative.      Physical Exam  Vitals and nursing note reviewed.   Constitutional:       General: She is not in acute distress.     Appearance: Normal appearance. She is not ill-appearing.   HENT:      Head: Normocephalic and atraumatic.   Cardiovascular:      Rate and Rhythm: Normal rate and regular rhythm.      Heart sounds: Normal heart sounds.   Pulmonary:      Effort: Pulmonary effort is normal. No respiratory distress.      Breath sounds: Normal breath sounds. No wheezing or rales.   Skin:     General: Skin is warm and dry.   Neurological:      General: No focal deficit present.      Mental Status: She is alert and oriented to person, place, and time.   Psychiatric:         Mood and Affect: Mood normal.         Behavior: Behavior normal.         ASSESSMENT CBC was normal.  CMP is also completely normal and lipid panel has total cholesterol 213,  and LDL 91.  TSH and free T4 were normal and vitamin D level is stable at 31.9.  #1-hyperlipidemia, controlled by diet  #2-osteoporosis, asymptomatic  #3-history of episodic confusion, quite alert today  #4-insomnia, controlled on zolpidem       Problems Addressed this Visit        Cardiac and Vasculature    Hyperlipidemia - Primary       Musculoskeletal and Injuries    Age-related osteoporosis without current pathological fracture       Neuro    Episodic confusion       Sleep    Insomnia      Diagnoses       Codes Comments    Hyperlipidemia, unspecified hyperlipidemia type    -  Primary ICD-10-CM: E78.5  ICD-9-CM: 272.4     Age-related osteoporosis without current  pathological fracture     ICD-10-CM: M81.0  ICD-9-CM: 733.01     Episodic confusion     ICD-10-CM: R41.0  ICD-9-CM: 298.9     Primary insomnia     ICD-10-CM: F51.01  ICD-9-CM: 307.42           PLAN the patient will continue current medicines as now and I plan on rechecking her in 6 months with laboratory studies and a wellness visit    There are no Patient Instructions on file for this visit.  Return in about 6 months (around 11/5/2022) for with labs.

## 2022-07-05 RX ORDER — CETIRIZINE HYDROCHLORIDE, PSEUDOEPHEDRINE HYDROCHLORIDE 5; 120 MG/1; MG/1
TABLET, FILM COATED, EXTENDED RELEASE ORAL
Qty: 48 TABLET | Refills: 4 | Status: SHIPPED | OUTPATIENT
Start: 2022-07-05 | End: 2022-11-07 | Stop reason: SDUPTHER

## 2022-07-05 NOTE — TELEPHONE ENCOUNTER
Dr. Borrego,      Please review and refill if appropriate. According to the chart, this medication was discontinued 10/2021. Let me know if anything additional is needed.      Thanks,  Rahul      Next OV 11/09/2022  Last OV 05/05/2022

## 2022-08-03 RX ORDER — ZOLPIDEM TARTRATE 5 MG/1
TABLET ORAL
Qty: 30 TABLET | Refills: 1 | Status: SHIPPED | OUTPATIENT
Start: 2022-08-03 | End: 2022-09-28

## 2022-08-03 NOTE — TELEPHONE ENCOUNTER
Rx Refill Note  Requested Prescriptions     Pending Prescriptions Disp Refills   • zolpidem (AMBIEN) 5 MG tablet [Pharmacy Med Name: ZOLPIDEM 5MG TABLETS] 30 tablet      Sig: TAKE 1 TABLET BY MOUTH AT NIGHT AS NEEDED FOR SLEEP      Last office visit with prescribing clinician: 5/5/2022      Next office visit with prescribing clinician: 11/9/2022            Mariela Núñez MA  08/03/22, 11:45 EDT

## 2022-09-28 RX ORDER — ZOLPIDEM TARTRATE 5 MG/1
TABLET ORAL
Qty: 30 TABLET | Refills: 1 | Status: SHIPPED | OUTPATIENT
Start: 2022-09-28 | End: 2022-12-08 | Stop reason: SDUPTHER

## 2022-09-28 NOTE — TELEPHONE ENCOUNTER
Rx Refill Note  Requested Prescriptions     Pending Prescriptions Disp Refills   • zolpidem (AMBIEN) 5 MG tablet [Pharmacy Med Name: ZOLPIDEM 5MG TABLETS] 30 tablet 1     Sig: TAKE 1 TABLET BY MOUTH AT NIGHT AS NEEDED FOR SLEEP      Last office visit with prescribing clinician: 5/5/2022      Next office visit with prescribing clinician: 11/9/2022            Mariela Núñez MA  09/28/22, 06:47 EDT

## 2022-11-07 RX ORDER — CETIRIZINE HYDROCHLORIDE, PSEUDOEPHEDRINE HYDROCHLORIDE 5; 120 MG/1; MG/1
1 TABLET, FILM COATED, EXTENDED RELEASE ORAL 2 TIMES DAILY
Qty: 60 TABLET | Refills: 4 | Status: SHIPPED | OUTPATIENT
Start: 2022-11-07

## 2022-11-07 NOTE — TELEPHONE ENCOUNTER
Rx Refill Note  Requested Prescriptions      No prescriptions requested or ordered in this encounter      Last office visit with prescribing clinician: 5/5/2022      Next office visit with prescribing clinician: 11/9/2022            Mariela Núñez MA  11/07/22, 10:16 EST

## 2022-11-09 ENCOUNTER — OFFICE VISIT (OUTPATIENT)
Dept: FAMILY MEDICINE CLINIC | Facility: CLINIC | Age: 82
End: 2022-11-09

## 2022-11-09 ENCOUNTER — TELEPHONE (OUTPATIENT)
Dept: FAMILY MEDICINE CLINIC | Facility: CLINIC | Age: 82
End: 2022-11-09

## 2022-11-09 NOTE — TELEPHONE ENCOUNTER
Caller: Samantha Davila    Relationship: Self    Best call back number: [phone]    What is the best time to reach you: ANY    Who are you requesting to speak with (clinical staff, provider,  specific staff member): CLINCICAL      What was the call regarding: PATIENT WOULD LIKE THE RESULTS OF LABS FROM 11.4.22    Do you require a callback: YES

## 2022-11-11 NOTE — TELEPHONE ENCOUNTER
DR. BOURGEOIS,    PATIENT HAD LABS DONE RECENTLY, BUT HAD TO CANCEL AWV. CAN YOU PLEASE ADVISE OF LAB RESULTS? PATIENT IS COMING IN FOR AWV IN January, BUT IS WANTING THE LAB RESULTS BEFORE THEN. THANK YOU.

## 2022-11-15 NOTE — TELEPHONE ENCOUNTER
Caller: Samantha Davila    Relationship: Self    Best call back number: [phone]    PATIENT WAS READ HUB TO READ.

## 2022-11-15 NOTE — TELEPHONE ENCOUNTER
HUB TO READ: CALLED AND LEFT MESSAGE FOR PT TO CALL BACK FOR LAB RESULTS. PER DR. BESS- Her labs were stable and generally unchanged and do not need to make any medication changes or do anything different.  We can discuss in detail when I see them in January

## 2022-12-08 RX ORDER — ZOLPIDEM TARTRATE 5 MG/1
5 TABLET ORAL NIGHTLY PRN
Qty: 30 TABLET | Refills: 1 | Status: SHIPPED | OUTPATIENT
Start: 2022-12-08 | End: 2023-02-07 | Stop reason: SDUPTHER

## 2022-12-08 NOTE — TELEPHONE ENCOUNTER
Caller: John Davila    Relationship: Emergency Contact    Best call back number: [phone]  What medication are you requesting: ZOLPIDEM 5 MG   What are your current symptoms: INSOMNIA       Have you had these symptoms before:    [x] Yes  [] No    Have you been treated for these symptoms before:   [x] Yes  [] No    If a prescription is needed, what is your preferred pharmacy and phone number: [id number] [place], [place] - [address] AT [facility] [address] - [phone]  - [phone] FX     Additional notes:

## 2022-12-08 NOTE — TELEPHONE ENCOUNTER
Rx Refill Note  Requested Prescriptions      No prescriptions requested or ordered in this encounter      Last office visit with prescribing clinician: 5/5/2022   Last telemedicine visit with prescribing clinician: 1/5/2023   Next office visit with prescribing clinician: 1/5/2023                         Would you like a call back once the refill request has been completed: [] Yes [] No    If the office needs to give you a call back, can they leave a voicemail: [] Yes [] No    Mariela Núñez MA  12/08/22, 13:38 EST

## 2023-02-07 RX ORDER — ZOLPIDEM TARTRATE 5 MG/1
5 TABLET ORAL NIGHTLY PRN
Qty: 30 TABLET | Refills: 1 | Status: SHIPPED | OUTPATIENT
Start: 2023-02-07

## 2023-02-27 ENCOUNTER — TELEPHONE (OUTPATIENT)
Dept: FAMILY MEDICINE CLINIC | Facility: CLINIC | Age: 83
End: 2023-02-27

## 2023-02-27 NOTE — TELEPHONE ENCOUNTER
CALLED AND S/W YAYO AND ADVISED THAT WE DO NOT HAVE FORM SHOWING HER AS POA. SHE SAID SHE WILL BRING ONE IN.

## 2023-02-27 NOTE — TELEPHONE ENCOUNTER
CALLER: YAYOLO ANNE    Relationship to patient: POA/Surrogate/Guardian    Best call back number: [phone]    Patient is needing:YAYO IS CALLING TO STATE PATIENT IS GOING TO RESPITE CARE AT [facility] AND IS NEEDING THE LAST HISTORY AND PHYSICAL WITHIN THE LAST 14 DAYS, AND LIST OF MEDICATIONS.  SHE STATES SHE NEEDS TO MEET WITH [facility] TODAY AT 1:00 AND WOULD LIKE TO  BEFORE THEN.      PLEASE ADVISE.

## 2023-03-01 ENCOUNTER — OFFICE VISIT (OUTPATIENT)
Dept: FAMILY MEDICINE CLINIC | Facility: CLINIC | Age: 83
End: 2023-03-01
Payer: MEDICARE

## 2023-03-01 VITALS
DIASTOLIC BLOOD PRESSURE: 78 MMHG | WEIGHT: 118 LBS | HEART RATE: 89 BPM | TEMPERATURE: 98.2 F | BODY MASS INDEX: 18.96 KG/M2 | SYSTOLIC BLOOD PRESSURE: 116 MMHG | RESPIRATION RATE: 16 BRPM | OXYGEN SATURATION: 98 % | HEIGHT: 66 IN

## 2023-03-01 DIAGNOSIS — Z00.00 HEALTH MAINTENANCE EXAMINATION: Primary | ICD-10-CM

## 2023-03-01 DIAGNOSIS — R41.0 EPISODIC CONFUSION: ICD-10-CM

## 2023-03-01 DIAGNOSIS — F51.01 PRIMARY INSOMNIA: ICD-10-CM

## 2023-03-01 DIAGNOSIS — M81.0 AGE-RELATED OSTEOPOROSIS WITHOUT CURRENT PATHOLOGICAL FRACTURE: ICD-10-CM

## 2023-03-01 DIAGNOSIS — E78.5 HYPERLIPIDEMIA, UNSPECIFIED HYPERLIPIDEMIA TYPE: ICD-10-CM

## 2023-03-01 PROCEDURE — G0439 PPPS, SUBSEQ VISIT: HCPCS | Performed by: INTERNAL MEDICINE

## 2023-03-01 PROCEDURE — 1160F RVW MEDS BY RX/DR IN RCRD: CPT | Performed by: INTERNAL MEDICINE

## 2023-03-01 PROCEDURE — 1159F MED LIST DOCD IN RCRD: CPT | Performed by: INTERNAL MEDICINE

## 2023-03-01 PROCEDURE — 1170F FXNL STATUS ASSESSED: CPT | Performed by: INTERNAL MEDICINE

## 2023-03-01 PROCEDURE — 1125F AMNT PAIN NOTED PAIN PRSNT: CPT | Performed by: INTERNAL MEDICINE

## 2023-03-01 PROCEDURE — 1126F AMNT PAIN NOTED NONE PRSNT: CPT | Performed by: INTERNAL MEDICINE

## 2023-03-01 NOTE — PROGRESS NOTES
The ABCs of the Annual Wellness Visit  Subsequent Medicare Wellness Visit    Subjective      Samantha Davila is a 83 y.o. female who presents for a Subsequent Medicare Wellness Visit.  She generally feels well.  She is ambulating using a walker and is accompanied by a .    The following portions of the patient's history were reviewed and   updated as appropriate: allergies, current medications, past family history, past medical history, past social history, past surgical history and problem list.    Compared to one year ago, the patient feels her physical   health is better.    Compared to one year ago, the patient feels her mental   health is the same.    Recent Hospitalizations:  She was not admitted to the hospital during the last year.       Current Medical Providers:  Patient Care Team:  Chang Borrego MD as PCP - General (Internal Medicine)    Outpatient Medications Prior to Visit   Medication Sig Dispense Refill   • Budesonide (ENTOCORT EC) 3 MG 24 hr capsule TAKE 3 CS PO QD  12   • cetirizine-pseudoephedrine (ZyrTEC-D) 5-120 MG per 12 hr tablet Take 1 tablet by mouth 2 (Two) Times a Day. 60 tablet 4   • Cholecalciferol (VITAMIN D3) 2000 units tablet Take  by mouth.     • lactobacillus acidophilus (RISAQUAD) capsule capsule Take 1 capsule by mouth Daily.     • Multiple Vitamins-Minerals (WOMENS 50+ MULTI VITAMIN/MIN PO) Take  by mouth.     • zolpidem (AMBIEN) 5 MG tablet Take 1 tablet by mouth At Night As Needed for Sleep. 30 tablet 1   • apixaban (ELIQUIS) 2.5 MG tablet tablet Take 1 tablet by mouth Every 12 (Twelve) Hours. Indications: Prevention of Unwanted Clot in Veins 60 tablet    • escitalopram (LEXAPRO) 5 MG tablet Take 1 tablet by mouth Every Night.     • HYDROcodone-acetaminophen (NORCO) 5-325 MG per tablet Take 1 tablet by mouth Every 6 (Six) Hours As Needed for Moderate Pain  or Severe Pain . 30 tablet 0   • polyethylene glycol (polyethylene glycol) 17 g packet Take 17 g by mouth Daily.    "    No facility-administered medications prior to visit.       Opioid medication/s are on active medication list.  and I have evaluated her active treatment plan and pain score trends (see table).  There were no vitals filed for this visit.  I have reviewed the chart for potential of high risk medication and harmful drug interactions in the elderly.            Aspirin is not on active medication list.  Aspirin use is not indicated based on review of current medical condition/s. Risk of harm outweighs potential benefits.  .    Patient Active Problem List   Diagnosis   • Neurodermatitis   • Insomnia   • Viral upper respiratory tract infection   • Closed fracture of neck of right femur (HCC)   • Fall   • Weight loss   • Colitis, collagenous   • Age-related osteoporosis without current pathological fracture   • Hyperlipidemia   • Neck pain   • Closed nondisplaced intertrochanteric fracture of left femur (HCC)   • Episodic confusion   • Osteoarthritis of left knee     Advance Care Planning  Advance Directive is on file.  ACP discussion was held with the patient during this visit. Patient has an advance directive in EMR which is still valid.      Objective    Vitals:    03/01/23 1046   BP: 116/78   BP Location: Left arm   Patient Position: Sitting   Cuff Size: Adult   Pulse: 89   Resp: 16   Temp: 98.2 °F (36.8 °C)   TempSrc: Temporal   SpO2: 98%   Weight: 53.5 kg (118 lb)   Height: 167.6 cm (65.98\")     Estimated body mass index is 19.05 kg/m² as calculated from the following:    Height as of this encounter: 167.6 cm (65.98\").    Weight as of this encounter: 53.5 kg (118 lb).    BMI is within normal parameters. No other follow-up for BMI required.      Does the patient have evidence of cognitive impairment?   No            HEALTH RISK ASSESSMENT    Smoking Status:  Social History     Tobacco Use   Smoking Status Never   Smokeless Tobacco Never     Alcohol Consumption:  Social History     Substance and Sexual Activity "   Alcohol Use Yes     Fall Risk Screen:    STEADI Fall Risk Assessment was completed, and patient is at HIGH risk for falls. Assessment completed on:3/1/2023    Depression Screening:  PHQ-2/PHQ-9 Depression Screening 3/1/2023   Little Interest or Pleasure in Doing Things 0-->not at all   Feeling Down, Depressed or Hopeless 0-->not at all   PHQ-9: Brief Depression Severity Measure Score 0       Health Habits and Functional and Cognitive Screening:  Functional & Cognitive Status 3/1/2023   Do you have difficulty preparing food and eating? Yes   Do you have difficulty bathing yourself, getting dressed or grooming yourself? No   Do you have difficulty using the toilet? No   Do you have difficulty moving around from place to place? No   Do you have trouble with steps or getting out of a bed or a chair? No   Current Diet Well Balanced Diet   Dental Exam Not up to date   Eye Exam Up to date   Exercise (times per week) 0 times per week   Current Exercises Include No Regular Exercise   Do you need help using the phone?  Yes   Are you deaf or do you have serious difficulty hearing?  No   Do you need help with transportation? Yes   Do you need help shopping? Yes   Do you need help preparing meals?  Yes   Do you need help with housework?  Yes   Do you need help with laundry? No   Do you need help taking your medications? No   Do you need help managing money? Yes   Do you ever drive or ride in a car without wearing a seat belt? No   Have you felt unusual stress, anger or loneliness in the last month? Yes   Who do you live with? Spouse   If you need help, do you have trouble finding someone available to you? No   Have you been bothered in the last four weeks by sexual problems? No   Do you have difficulty concentrating, remembering or making decisions? Yes       Age-appropriate Screening Schedule:  Refer to the list below for future screening recommendations based on patient's age, sex and/or medical conditions. Orders for these  recommended tests are listed in the plan section. The patient has been provided with a written plan.    Health Maintenance   Topic Date Due   • ZOSTER VACCINE (1 of 2) Never done   • ANNUAL WELLNESS VISIT  09/29/2022   • MAMMOGRAM  11/23/2022   • DXA SCAN  11/23/2022   • LIPID PANEL  11/04/2023   • TDAP/TD VACCINES (2 - Td or Tdap) 03/02/2029   • COVID-19 Vaccine  Completed   • INFLUENZA VACCINE  Completed   • Pneumococcal Vaccine 65+  Completed                CMS Preventative Services Quick Reference  Risk Factors Identified During Encounter: The patient is due for bone density test and mammograms but those are currently being postponed because of problems with transportation.    Fall Risk-High or Moderate: Discussed Fall Prevention in the home.  The patient will continue to ambulate using a walker.  Immunizations Discussed/Encouraged: Shingrix    The above risks/problems have been discussed with the patient.  Pertinent information has been shared with the patient in the After Visit Summary.    Diagnoses and all orders for this visit:    1. Health maintenance examination (Primary)  -     CBC & Differential  -     Comprehensive Metabolic Panel  -     Hemoglobin A1c  -     TSH  -     T4, Free  -     Vitamin D,25-Hydroxy    2. Primary insomnia  -     TSH  -     T4, Free    3. Episodic confusion  -     CBC & Differential  -     Comprehensive Metabolic Panel  -     TSH  -     T4, Free    4. Age-related osteoporosis without current pathological fracture  -     Vitamin D,25-Hydroxy    5. Hyperlipidemia, unspecified hyperlipidemia type  -     Lipid Panel With LDL / HDL Ratio        Follow Up: Patient will have laboratory studies drawn before she leaves today.  She will continue current medicines and I will recheck her in 6 months with a CBC, CMP, lipid panel, hemoglobin A1c, TSH and free T4 and vitamin D level      Next Medicare Wellness visit to be scheduled in 1 year.      An After Visit Summary and PPPS were made  available to the patient.

## 2023-03-02 ENCOUNTER — TELEPHONE (OUTPATIENT)
Dept: FAMILY MEDICINE CLINIC | Facility: CLINIC | Age: 83
End: 2023-03-02
Payer: MEDICARE

## 2023-03-02 LAB
25(OH)D3+25(OH)D2 SERPL-MCNC: 35.5 NG/ML (ref 30–100)
ALBUMIN SERPL-MCNC: 4.3 G/DL (ref 3.5–5.2)
ALBUMIN/GLOB SERPL: 2.3 G/DL
ALP SERPL-CCNC: 85 U/L (ref 39–117)
ALT SERPL-CCNC: 17 U/L (ref 1–33)
AST SERPL-CCNC: 20 U/L (ref 1–32)
BASOPHILS # BLD AUTO: 0.04 10*3/MM3 (ref 0–0.2)
BASOPHILS NFR BLD AUTO: 0.7 % (ref 0–1.5)
BILIRUB SERPL-MCNC: 0.5 MG/DL (ref 0–1.2)
BUN SERPL-MCNC: 13 MG/DL (ref 8–23)
BUN/CREAT SERPL: 18.3 (ref 7–25)
CALCIUM SERPL-MCNC: 9.7 MG/DL (ref 8.6–10.5)
CHLORIDE SERPL-SCNC: 104 MMOL/L (ref 98–107)
CHOLEST SERPL-MCNC: 194 MG/DL (ref 0–200)
CO2 SERPL-SCNC: 31.5 MMOL/L (ref 22–29)
CREAT SERPL-MCNC: 0.71 MG/DL (ref 0.57–1)
EGFRCR SERPLBLD CKD-EPI 2021: 84.5 ML/MIN/1.73
EOSINOPHIL # BLD AUTO: 0.08 10*3/MM3 (ref 0–0.4)
EOSINOPHIL NFR BLD AUTO: 1.4 % (ref 0.3–6.2)
ERYTHROCYTE [DISTWIDTH] IN BLOOD BY AUTOMATED COUNT: 12.2 % (ref 12.3–15.4)
GLOBULIN SER CALC-MCNC: 1.9 GM/DL
GLUCOSE SERPL-MCNC: 80 MG/DL (ref 65–99)
HBA1C MFR BLD: 5.7 % (ref 4.8–5.6)
HCT VFR BLD AUTO: 43.2 % (ref 34–46.6)
HDLC SERPL-MCNC: 84 MG/DL (ref 40–60)
HGB BLD-MCNC: 14.6 G/DL (ref 12–15.9)
IMM GRANULOCYTES # BLD AUTO: 0.01 10*3/MM3 (ref 0–0.05)
IMM GRANULOCYTES NFR BLD AUTO: 0.2 % (ref 0–0.5)
LDLC SERPL CALC-MCNC: 99 MG/DL (ref 0–100)
LDLC/HDLC SERPL: 1.16 {RATIO}
LYMPHOCYTES # BLD AUTO: 2.06 10*3/MM3 (ref 0.7–3.1)
LYMPHOCYTES NFR BLD AUTO: 35.9 % (ref 19.6–45.3)
MCH RBC QN AUTO: 30.4 PG (ref 26.6–33)
MCHC RBC AUTO-ENTMCNC: 33.8 G/DL (ref 31.5–35.7)
MCV RBC AUTO: 89.8 FL (ref 79–97)
MONOCYTES # BLD AUTO: 0.5 10*3/MM3 (ref 0.1–0.9)
MONOCYTES NFR BLD AUTO: 8.7 % (ref 5–12)
NEUTROPHILS # BLD AUTO: 3.05 10*3/MM3 (ref 1.7–7)
NEUTROPHILS NFR BLD AUTO: 53.1 % (ref 42.7–76)
NRBC BLD AUTO-RTO: 0 /100 WBC (ref 0–0.2)
PLATELET # BLD AUTO: 311 10*3/MM3 (ref 140–450)
POTASSIUM SERPL-SCNC: 3.9 MMOL/L (ref 3.5–5.2)
PROT SERPL-MCNC: 6.2 G/DL (ref 6–8.5)
RBC # BLD AUTO: 4.81 10*6/MM3 (ref 3.77–5.28)
SODIUM SERPL-SCNC: 144 MMOL/L (ref 136–145)
T4 FREE SERPL-MCNC: 1.27 NG/DL (ref 0.93–1.7)
TRIGL SERPL-MCNC: 61 MG/DL (ref 0–150)
TSH SERPL DL<=0.005 MIU/L-ACNC: 0.9 UIU/ML (ref 0.27–4.2)
VLDLC SERPL CALC-MCNC: 11 MG/DL (ref 5–40)
WBC # BLD AUTO: 5.74 10*3/MM3 (ref 3.4–10.8)

## 2023-03-02 NOTE — TELEPHONE ENCOUNTER
BRICE that I would be sending [employer] message;message sent.    PLEASE INFORM HER OF MESSAGE BELOW IF SHE RETURNS CALL, THANKS!      **We can let her know that her blood tests were all stable and in good shape and do not need to do anything different and I will see her in 6 months with labs **

## 2023-03-08 NOTE — TELEPHONE ENCOUNTER
LET NIECE KNOW LABS WERE STABLE AND GOOD, DOES NOT NEED TO BE SEEN TIL 6 MONTHS. VOICED UNDERSTANDING

## 2023-03-12 ENCOUNTER — APPOINTMENT (OUTPATIENT)
Dept: CT IMAGING | Facility: HOSPITAL | Age: 83
End: 2023-03-12
Payer: MEDICARE

## 2023-03-12 ENCOUNTER — HOSPITAL ENCOUNTER (EMERGENCY)
Facility: HOSPITAL | Age: 83
Discharge: NURSING, REHAB OR LONG-TERM CARE | End: 2023-03-12
Attending: EMERGENCY MEDICINE | Admitting: EMERGENCY MEDICINE
Payer: MEDICARE

## 2023-03-12 VITALS
TEMPERATURE: 98.4 F | SYSTOLIC BLOOD PRESSURE: 126 MMHG | OXYGEN SATURATION: 95 % | DIASTOLIC BLOOD PRESSURE: 66 MMHG | HEIGHT: 63 IN | HEART RATE: 83 BPM | BODY MASS INDEX: 20.9 KG/M2 | RESPIRATION RATE: 16 BRPM

## 2023-03-12 DIAGNOSIS — Z79.01 CHRONIC ANTICOAGULATION: ICD-10-CM

## 2023-03-12 DIAGNOSIS — S00.03XA HEMATOMA OF OCCIPITAL REGION OF SCALP: Primary | ICD-10-CM
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
## 2023-03-12 PROCEDURE — 70450 CT HEAD/BRAIN W/O DYE: CPT

## 2023-03-12 PROCEDURE — 99283 EMERGENCY DEPT VISIT LOW MDM: CPT

## 2023-03-12 NOTE — ED TRIAGE NOTES
Patient to ED per EMS from home w/ reports of mechanical fall onto carpet; patient denies LOC, N/V, blood thinner use.    Patient and staff w/ appropriate PPE in place throughout encounter.

## 2023-03-12 NOTE — ED NOTES
Pt to ED s/po mechanical trip and fall at daughters apt. States she lost her balance and fell backwards. + head injury, no bleeding noted, no LOC.     Denies dizziness prior to falling.      Pt wearing face mask during their stay in ER. This RN wore appropriate ppe while providing patient care.

## 2023-03-13 NOTE — ED NOTES
Spoke to pt's HCS, states told by staff she had fallen backwards and hit head on rollator walker, confirms mild dementia at baseline.     Sanam Mas [phone] to call when ready for DC.

## 2023-03-13 NOTE — ED PROVIDER NOTES
EMERGENCY DEPARTMENT ENCOUNTER    Room Number:  22/22  Date seen:  3/12/2023  PCP: Chang Borrego MD  Historian: Patient, nursing facility      HPI:  Chief Complaint: Fall, head injury  A complete HPI/ROS/PMH/PSH/SH/FH are unobtainable due to: Nothing  Context: Samantha Davila is a 83 y.o. female who presents to the ED c/o fall and head injury at nursing facility.  Staff at the facility told patient's healthcare surrogate that she had fallen and hit her head on a rollator walker.  Patient has a history of dementia.  Review of her home medication list reveals Eliquis however patient does not believe that she still takes this medication.  She denies loss of consciousness.  She reports that she just lost her balance causing her to fall.  She denies neck pain.            PAST MEDICAL HISTORY  Active Ambulatory Problems     Diagnosis Date Noted   • Neurodermatitis 06/04/2016   • Insomnia 05/10/2017   • Viral upper respiratory tract infection 04/08/2018   • Closed fracture of neck of right femur (HCC) 03/02/2019   • Fall 03/03/2019   • Weight loss 06/18/2019   • Colitis, collagenous 02/14/2020   • Age-related osteoporosis without current pathological fracture 03/10/2020   • Hyperlipidemia 03/10/2020   • Neck pain 09/02/2021   • Closed nondisplaced intertrochanteric fracture of left femur (HCC) 11/03/2021   • Episodic confusion 11/03/2021   • Osteoarthritis of left knee 02/23/2022     Resolved Ambulatory Problems     Diagnosis Date Noted   • Acute pain of left knee 02/23/2022   • Acute left ankle pain 02/23/2022     Past Medical History:   Diagnosis Date   • Arthritis    • Asthma    • Colon polyp          PAST SURGICAL HISTORY  Past Surgical History:   Procedure Laterality Date   • BACK SURGERY     • FEMUR IM NAILING/RODDING Left 11/3/2021    Procedure: FEMUR INTRAMEDULLARY NAILING/RODDING;  Surgeon: Michael Gillespie II, MD;  Location: [facility] OR;  Service: Orthopedics;  Laterality: Left;  [place] Table   •  ROTATOR CUFF REPAIR Left    • TONSILLECTOMY     • TOTAL HIP ARTHROPLASTY Right 3/4/2019    Procedure: TOTAL HIP ARTHROPLASTY ANTERIOR WITH HANA TABLE;  Surgeon: Michael Gillespie II, MD;  Location: [facility] OR;  Service: Orthopedics         FAMILY HISTORY  Family History   Problem Relation Age of Onset   • Osteoporosis Mother    • Thyroid cancer Mother    • Lung cancer Father         HEART   • Breast cancer Paternal Grandmother          SOCIAL HISTORY  Social History     Socioeconomic History   • Marital status:    Tobacco Use   • Smoking status: Never   • Smokeless tobacco: Never   Vaping Use   • Vaping Use: Never used   Substance and Sexual Activity   • Alcohol use: Yes   • Drug use: Defer   • Sexual activity: Defer         ALLERGIES  Quinine derivatives and Sulfa antibiotics        REVIEW OF SYSTEMS  Review of Systems   Review of all 14 systems is negative other than stated in the HPI above.      PHYSICAL EXAM  ED Triage Vitals [03/12/23 1931]   Temp Heart Rate Resp BP SpO2   98.4 °F (36.9 °C) 78 16 137/58 98 %      Temp src Heart Rate Source Patient Position BP Location FiO2 (%)   Tympanic -- -- -- --       Physical Exam      GENERAL: Awake and alert, no acute distress  HENT: nares patent, small occipital scalp hematoma, no laceration  EYES: no scleral icterus, pupils 3 mm reactive bilaterally, EOMI  CV: regular rhythm, normal rate  RESPIRATORY: normal effort  ABDOMEN: soft  MUSCULOSKELETAL: no deformity, no midline cervical spine tenderness or step-off.  Pelvis is stable nontender.  No pain with passive ranging of the hips bilaterally.  NEURO: alert, moves all extremities, follows commands  PSYCH:  calm, cooperative  SKIN: warm, dry    Vital signs and nursing notes reviewed.          LAB RESULTS  No results found for this or any previous visit (from the past 24 hour(s)).    Ordered the above labs and reviewed the results.        RADIOLOGY  CT Head Without Contrast    Result Date:  3/12/2023  HEAD CT  HISTORY: Fall with head injury. On anticoagulant therapy.  TECHNIQUE: Noncontrast head CT is provided. Comparison: Brain MRI 11/04/2021 and head CT 03/02/2019.  FINDINGS: As previously demonstrated, there is diffuse loss of cerebral parenchymal volume with extensive patchy low-density in the cerebral white matter. This appears similar as the signal abnormality on the prior brain MRI. There is no evidence of intracranial hemorrhage or acute ischemic change. The bones of the skull are intact. Visualized paranasal sinuses are clear.      Chronic changes in the brain. No evidence of acute intracranial hemorrhage or other acute abnormality.  Radiation dose reduction techniques were utilized, including automated exposure control and exposure modulation based on body size.  This report was finalized on 3/12/2023 10:10 PM by Dr. Jani Duran M.D.        Ordered the above noted radiological studies. Reviewed by me in PACS.            PROCEDURES  Procedures              MEDICATIONS GIVEN IN ER  Medications - No data to display                MEDICAL DECISION MAKING, PROGRESS, and CONSULTS    All labs have been independently reviewed by me.  All radiology studies have been reviewed by me and I have also reviewed the radiology report.   EKG's independently viewed and interpreted by me.  Discussion below represents my analysis of pertinent findings related to patient's condition, differential diagnosis, treatment plan and final disposition.      Additional sources:  - Discussed/ obtained information from independent historians: Nursing facility personnel    - External (non-ED) record review: I reviewed PCP office visit from 3/1/2023.  She was seen for follow-up of insomnia, episodic confusion, osteoporosis, hyperlipidemia.    - Chronic or social conditions impacting care: Dementia          Orders placed during this visit:  Orders Placed This Encounter   Procedures   • CT Head Without Contrast          Differential diagnosis:    Traumatic intracranial hemorrhage  Concussion        Independent interpretation of labs, radiology studies, and discussions with consultants:  ED Course as of 03/12/23 2332   Sun Mar 12, 2023   2145 CT brain independently interpreted in PACS and demonstrates no acute intracranial hemorrhage. [JR]   2146 Patient is appropriate for discharge back to nursing facility.  She does not have any apparent injuries other than a small scalp hematoma. [JR]      ED Course User Index  [JR] Geoffrey Valadez MD             I wore an N95 mask, face shield, and gloves during this patient encounter.  Patient also wearing a surgical mask.  Hand hygeine performed before and after seeing the patient.    DIAGNOSIS  Final diagnoses:   Hematoma of occipital region of scalp   Fall, initial encounter   Chronic anticoagulation         DISPOSITION  DISCHARGE    Patient discharged in stable condition.    Reviewed implications of results, diagnosis, meds, responsibility to follow up, warning signs and symptoms of possible worsening, potential complications and reasons to return to ER.    Patient/Family voiced understanding of above instructions.    Discussed plan for discharge, as there is no emergent indication for admission. Patient referred to primary care provider for BP management due to today's BP. Pt/family is agreeable and understands need for follow up and repeat testing.  Pt is aware that discharge does not mean that nothing is wrong but it indicates no emergency is present that requires admission and they must continue care with follow-up as given below or physician of their choice.     FOLLOW-UP  Chang Borrego MD  [id number] [id number] [phone]      As needed         Medication List      No changes were made to your prescriptions during this visit.                   Latest Documented Vital Signs:  As of 23:32 EDT  BP- 126/66 HR- 83 Temp- 98.4 °F (36.9 °C) (Tympanic) O2  sat- 95%              --    Please note that portions of this were completed with a voice recognition program.       Note Disclaimer: At [facility], we believe that sharing information builds trust and better relationships. You are receiving this note because you are receiving care at [facility] or recently visited. It is possible you will see health information before a provider has talked with you about it. This kind of information can be easy to misunderstand. To help you fully understand what it means for your health, we urge you to discuss this note with your provider.             Geoffrey Valadez MD  03/12/23 1043

## 2025-05-05 ENCOUNTER — APPOINTMENT (OUTPATIENT)
Dept: GENERAL RADIOLOGY | Facility: HOSPITAL | Age: 85
DRG: 871 | End: 2025-05-05
Payer: MEDICARE

## 2025-05-05 ENCOUNTER — HOSPITAL ENCOUNTER (INPATIENT)
Facility: HOSPITAL | Age: 85
LOS: 3 days | Discharge: HOME | DRG: 871 | End: 2025-05-09
Attending: STUDENT IN AN ORGANIZED HEALTH CARE EDUCATION/TRAINING PROGRAM | Admitting: INTERNAL MEDICINE
Payer: MEDICARE

## 2025-05-05 DIAGNOSIS — N30.00 ACUTE CYSTITIS WITHOUT HEMATURIA: ICD-10-CM

## 2025-05-05 DIAGNOSIS — R50.9 FEVER, UNSPECIFIED FEVER CAUSE: Primary | ICD-10-CM

## 2025-05-05 LAB
ALBUMIN SERPL-MCNC: 3.8 G/DL (ref 3.5–5.2)
ALBUMIN/GLOB SERPL: 1.7 G/DL
ALP SERPL-CCNC: 78 U/L (ref 39–117)
ALT SERPL W P-5'-P-CCNC: 14 U/L (ref 1–33)
ANION GAP SERPL CALCULATED.3IONS-SCNC: 13 MMOL/L (ref 5–15)
AST SERPL-CCNC: 19 U/L (ref 1–32)
B PARAPERT DNA SPEC QL NAA+PROBE: NOT DETECTED
B PERT DNA SPEC QL NAA+PROBE: NOT DETECTED
BACTERIA UR QL AUTO: ABNORMAL /HPF
BASOPHILS # BLD AUTO: 0.04 10*3/MM3 (ref 0–0.2)
BASOPHILS NFR BLD AUTO: 0.3 % (ref 0–1.5)
BILIRUB SERPL-MCNC: 0.4 MG/DL (ref 0–1.2)
BILIRUB UR QL STRIP: NEGATIVE
BUN SERPL-MCNC: 12 MG/DL (ref 8–23)
BUN/CREAT SERPL: 22.2 (ref 7–25)
C PNEUM DNA NPH QL NAA+NON-PROBE: NOT DETECTED
CALCIUM SPEC-SCNC: 8.3 MG/DL (ref 8.6–10.5)
CHLORIDE SERPL-SCNC: 100 MMOL/L (ref 98–107)
CLARITY UR: CLEAR
CO2 SERPL-SCNC: 23 MMOL/L (ref 22–29)
COLOR UR: YELLOW
CREAT SERPL-MCNC: 0.54 MG/DL (ref 0.57–1)
D-LACTATE SERPL-SCNC: 1.6 MMOL/L (ref 0.5–2)
DEPRECATED RDW RBC AUTO: 41.2 FL (ref 37–54)
EGFRCR SERPLBLD CKD-EPI 2021: 90.4 ML/MIN/1.73
EOSINOPHIL # BLD AUTO: 0.03 10*3/MM3 (ref 0–0.4)
EOSINOPHIL NFR BLD AUTO: 0.2 % (ref 0.3–6.2)
ERYTHROCYTE [DISTWIDTH] IN BLOOD BY AUTOMATED COUNT: 12.6 % (ref 12.3–15.4)
FLUAV SUBTYP SPEC NAA+PROBE: NOT DETECTED
FLUBV RNA ISLT QL NAA+PROBE: NOT DETECTED
GLOBULIN UR ELPH-MCNC: 2.2 GM/DL
GLUCOSE SERPL-MCNC: 113 MG/DL (ref 65–99)
GLUCOSE UR STRIP-MCNC: NEGATIVE MG/DL
HADV DNA SPEC NAA+PROBE: NOT DETECTED
HCOV 229E RNA SPEC QL NAA+PROBE: NOT DETECTED
HCOV HKU1 RNA SPEC QL NAA+PROBE: NOT DETECTED
HCOV NL63 RNA SPEC QL NAA+PROBE: NOT DETECTED
HCOV OC43 RNA SPEC QL NAA+PROBE: NOT DETECTED
HCT VFR BLD AUTO: 36.6 % (ref 34–46.6)
HGB BLD-MCNC: 12.4 G/DL (ref 12–15.9)
HGB UR QL STRIP.AUTO: NEGATIVE
HMPV RNA NPH QL NAA+NON-PROBE: NOT DETECTED
HOLD SPECIMEN: NORMAL
HOLD SPECIMEN: NORMAL
HPIV1 RNA ISLT QL NAA+PROBE: NOT DETECTED
HPIV2 RNA SPEC QL NAA+PROBE: NOT DETECTED
HPIV3 RNA NPH QL NAA+PROBE: NOT DETECTED
HPIV4 P GENE NPH QL NAA+PROBE: NOT DETECTED
HYALINE CASTS UR QL AUTO: ABNORMAL /LPF
IMM GRANULOCYTES # BLD AUTO: 0.04 10*3/MM3 (ref 0–0.05)
IMM GRANULOCYTES NFR BLD AUTO: 0.3 % (ref 0–0.5)
KETONES UR QL STRIP: NEGATIVE
LEUKOCYTE ESTERASE UR QL STRIP.AUTO: NEGATIVE
LIPASE SERPL-CCNC: 17 U/L (ref 13–60)
LYMPHOCYTES # BLD AUTO: 1.37 10*3/MM3 (ref 0.7–3.1)
LYMPHOCYTES NFR BLD AUTO: 11 % (ref 19.6–45.3)
M PNEUMO IGG SER IA-ACNC: NOT DETECTED
MCH RBC QN AUTO: 30.6 PG (ref 26.6–33)
MCHC RBC AUTO-ENTMCNC: 33.9 G/DL (ref 31.5–35.7)
MCV RBC AUTO: 90.4 FL (ref 79–97)
MONOCYTES # BLD AUTO: 0.53 10*3/MM3 (ref 0.1–0.9)
MONOCYTES NFR BLD AUTO: 4.2 % (ref 5–12)
NEUTROPHILS NFR BLD AUTO: 10.48 10*3/MM3 (ref 1.7–7)
NEUTROPHILS NFR BLD AUTO: 84 % (ref 42.7–76)
NITRITE UR QL STRIP: POSITIVE
NRBC BLD AUTO-RTO: 0 /100 WBC (ref 0–0.2)
PH UR STRIP.AUTO: 6 [PH] (ref 5–8)
PLATELET # BLD AUTO: 248 10*3/MM3 (ref 140–450)
PMV BLD AUTO: 9.3 FL (ref 6–12)
POTASSIUM SERPL-SCNC: 3.6 MMOL/L (ref 3.5–5.2)
PROT SERPL-MCNC: 6 G/DL (ref 6–8.5)
PROT UR QL STRIP: NEGATIVE
RBC # BLD AUTO: 4.05 10*6/MM3 (ref 3.77–5.28)
RBC # UR STRIP: ABNORMAL /HPF
REF LAB TEST METHOD: ABNORMAL
RHINOVIRUS RNA SPEC NAA+PROBE: NOT DETECTED
RSV RNA NPH QL NAA+NON-PROBE: NOT DETECTED
SARS-COV-2 RNA NPH QL NAA+NON-PROBE: NOT DETECTED
SODIUM SERPL-SCNC: 136 MMOL/L (ref 136–145)
SP GR UR STRIP: 1.01 (ref 1–1.03)
SQUAMOUS #/AREA URNS HPF: ABNORMAL /HPF
UROBILINOGEN UR QL STRIP: ABNORMAL
WBC # UR STRIP: ABNORMAL /HPF
WBC NRBC COR # BLD AUTO: 12.49 10*3/MM3 (ref 3.4–10.8)
WHOLE BLOOD HOLD COAG: NORMAL
WHOLE BLOOD HOLD SPECIMEN: NORMAL

## 2025-05-05 PROCEDURE — 36415 COLL VENOUS BLD VENIPUNCTURE: CPT

## 2025-05-05 PROCEDURE — 80053 COMPREHEN METABOLIC PANEL: CPT | Performed by: STUDENT IN AN ORGANIZED HEALTH CARE EDUCATION/TRAINING PROGRAM

## 2025-05-05 PROCEDURE — 71045 X-RAY EXAM CHEST 1 VIEW: CPT

## 2025-05-05 PROCEDURE — 25010000002 CEFTRIAXONE PER 250 MG: Performed by: STUDENT IN AN ORGANIZED HEALTH CARE EDUCATION/TRAINING PROGRAM

## 2025-05-05 PROCEDURE — 83605 ASSAY OF LACTIC ACID: CPT | Performed by: STUDENT IN AN ORGANIZED HEALTH CARE EDUCATION/TRAINING PROGRAM

## 2025-05-05 PROCEDURE — 99285 EMERGENCY DEPT VISIT HI MDM: CPT

## 2025-05-05 PROCEDURE — 81001 URINALYSIS AUTO W/SCOPE: CPT | Performed by: STUDENT IN AN ORGANIZED HEALTH CARE EDUCATION/TRAINING PROGRAM

## 2025-05-05 PROCEDURE — 83690 ASSAY OF LIPASE: CPT | Performed by: STUDENT IN AN ORGANIZED HEALTH CARE EDUCATION/TRAINING PROGRAM

## 2025-05-05 PROCEDURE — 87040 BLOOD CULTURE FOR BACTERIA: CPT

## 2025-05-05 PROCEDURE — 87481 CANDIDA DNA AMP PROBE: CPT | Performed by: STUDENT IN AN ORGANIZED HEALTH CARE EDUCATION/TRAINING PROGRAM

## 2025-05-05 PROCEDURE — 85025 COMPLETE CBC W/AUTO DIFF WBC: CPT

## 2025-05-05 PROCEDURE — 87449 NOS EACH ORGANISM AG IA: CPT | Performed by: INTERNAL MEDICINE

## 2025-05-05 PROCEDURE — G0378 HOSPITAL OBSERVATION PER HR: HCPCS

## 2025-05-05 PROCEDURE — 87077 CULTURE AEROBIC IDENTIFY: CPT | Performed by: STUDENT IN AN ORGANIZED HEALTH CARE EDUCATION/TRAINING PROGRAM

## 2025-05-05 PROCEDURE — 87186 SC STD MICRODIL/AGAR DIL: CPT | Performed by: STUDENT IN AN ORGANIZED HEALTH CARE EDUCATION/TRAINING PROGRAM

## 2025-05-05 PROCEDURE — 87154 CUL TYP ID BLD PTHGN 6+ TRGT: CPT | Performed by: STUDENT IN AN ORGANIZED HEALTH CARE EDUCATION/TRAINING PROGRAM

## 2025-05-05 PROCEDURE — 0202U NFCT DS 22 TRGT SARS-COV-2: CPT | Performed by: STUDENT IN AN ORGANIZED HEALTH CARE EDUCATION/TRAINING PROGRAM

## 2025-05-05 RX ORDER — SODIUM CHLORIDE 0.9 % (FLUSH) 0.9 %
10 SYRINGE (ML) INJECTION AS NEEDED
Status: DISCONTINUED | OUTPATIENT
Start: 2025-05-05 | End: 2025-05-09 | Stop reason: HOSPADM

## 2025-05-05 RX ORDER — GUAIFENESIN AND DEXTROMETHORPHAN HYDROBROMIDE 10; 100 MG/5ML; MG/5ML
10 SYRUP ORAL EVERY 4 HOURS PRN
Status: ON HOLD | COMMUNITY
End: 2025-05-06

## 2025-05-05 RX ORDER — LOPERAMIDE HYDROCHLORIDE 2 MG/1
4 CAPSULE ORAL ONCE
Status: ON HOLD | COMMUNITY
End: 2025-05-06

## 2025-05-05 RX ORDER — ACETAMINOPHEN 500 MG
1000 TABLET ORAL ONCE
Status: COMPLETED | OUTPATIENT
Start: 2025-05-05 | End: 2025-05-05

## 2025-05-05 RX ORDER — ALBUTEROL SULFATE 90 UG/1
1 INHALANT RESPIRATORY (INHALATION) EVERY 6 HOURS PRN
Status: ON HOLD | COMMUNITY
End: 2025-05-06

## 2025-05-05 RX ORDER — ONDANSETRON 4 MG/1
4 TABLET, FILM COATED ORAL EVERY 8 HOURS PRN
Status: ON HOLD | COMMUNITY
End: 2025-05-06

## 2025-05-05 RX ORDER — ACETAMINOPHEN 325 MG/1
650 TABLET ORAL EVERY 6 HOURS PRN
COMMUNITY

## 2025-05-05 RX ORDER — ACETAMINOPHEN 325 MG/1
650 TABLET ORAL ONCE
Status: COMPLETED | OUTPATIENT
Start: 2025-05-06 | End: 2025-05-05

## 2025-05-05 RX ADMIN — Medication 5 MG: at 23:48

## 2025-05-05 RX ADMIN — ACETAMINOPHEN 1000 MG: 500 TABLET ORAL at 17:47

## 2025-05-05 RX ADMIN — ACETAMINOPHEN 650 MG: 325 TABLET, FILM COATED ORAL at 23:48

## 2025-05-05 RX ADMIN — CEFTRIAXONE 2000 MG: 2 INJECTION, POWDER, FOR SOLUTION INTRAMUSCULAR; INTRAVENOUS at 20:00

## 2025-05-05 NOTE — PROGRESS NOTES
Clinical Pharmacy Services: Medication History    Samantha Davila is a 85 y.o. female presenting to [facility] for   Chief Complaint   Patient presents with    Fever    Nausea       She  has a past medical history of Arthritis, Asthma, Colitis, collagenous, Colon polyp, and Insomnia.    Allergies as of 05/05/2025 - Reviewed 05/05/2025   Allergen Reaction Noted    Quinine derivatives  06/04/2016    Sulfa antibiotics  06/04/2016       Medication information was obtained from: Nursing Home Paperwork ([facility])  Pharmacy and Phone Number:     Prior to Admission Medications       Prescriptions Last Dose Informant Patient Reported? Taking?    acetaminophen (TYLENOL) 325 MG tablet  Nursing Home Yes Yes    Take 2 tablets by mouth Every 6 (Six) Hours As Needed for Mild Pain.    albuterol sulfate  (90 Base) MCG/ACT inhaler  Nursing Home Yes Yes    Inhale 1 puff Every 6 (Six) Hours As Needed for Wheezing.    apixaban (ELIQUIS) 2.5 MG tablet tablet  Nursing Home No Yes    Take 1 tablet by mouth Every 12 (Twelve) Hours. Indications: Prevention of Unwanted Clot in Veins    Budesonide (ENTOCORT EC) 3 MG 24 hr capsule  Nursing Home Yes Yes    Take 3 capsules by mouth Every Morning.    cetirizine-pseudoephedrine (ZyrTEC-D) 5-120 MG per 12 hr tablet  Nursing Home No Yes    Take 1 tablet by mouth 2 (Two) Times a Day.    Cholecalciferol (VITAMIN D3) 2000 units tablet  Nursing Home Yes Yes    Take 1 tablet by mouth Daily.    dextromethorphan-guaifenesin (ROBITUSSIN-DM)  MG/5ML syrup  Nursing Home Yes Yes    Take 10 mL by mouth Every 4 (Four) Hours As Needed (Cough).    escitalopram (LEXAPRO) 5 MG tablet  Nursing Home No Yes    Take 1 tablet by mouth Every Night.    loperamide (IMODIUM) 2 MG capsule  Nursing Home Yes Yes    Take 2 capsules by mouth 1 (One) Time. Take 2 capsule after first loose stool, then 1 capsule after each loose stool.    Multiple Vitamins-Minerals (WOMENS 50+ MULTI  VITAMIN/MIN PO)  Nursing Home Yes Yes    Take 1 tablet by mouth Daily.    ondansetron (ZOFRAN) 4 MG tablet  Nursing Home Yes Yes    Take 1 tablet by mouth Every 8 (Eight) Hours As Needed for Nausea or Vomiting.    polyethylene glycol (polyethylene glycol) 17 g packet  Nursing Home No Yes    Take 17 g by mouth Daily.    zolpidem (AMBIEN) 5 MG tablet  Nursing Home No Yes    Take 1 tablet by mouth At Night As Needed for Sleep.              Medication notes:     This medication list is complete to the best of my knowledge as of 5/5/2025    Please call if questions.    Gregorio David  Medication History Technician   [phone]    5/5/2025 19:36 EDT

## 2025-05-05 NOTE — ED PROVIDER NOTES
EMERGENCY DEPARTMENT ENCOUNTER  Room Number:  27/27  PCP: Michelle Lyons MD  Independent Historians: Patient      HPI:  Chief Complaint: had concerns including Fever and Nausea.     Context: Samantha Davila is a 85 y.o. female with a medical history of arthritis, asthma who presents to the ED c/o acute fever, chills, nausea.  Patient states symptoms began earlier today.  Patient denies chest pain, shortness of breath, cough, dysuria, abdominal pain.  Patient has no complaints other than stating she just does not feel well.  Patient is febrile to 101.8 upon arrival.      Review of prior external notes (non-ED) -and- Review of prior external test results outside of this encounter: Office visit in 3/1/2023 reviewed and notable for annual Medicare wellness visit.  Patient noted to have insomnia and episodic confusion.  Plan to obtain outpatient laboratory evaluation and follow-up in 6 months.    Prescription drug monitoring program review:         PAST MEDICAL HISTORY  Active Ambulatory Problems     Diagnosis Date Noted    Neurodermatitis 06/04/2016    Insomnia 05/10/2017    Viral upper respiratory tract infection 04/08/2018    Closed fracture of neck of right femur 03/02/2019    Fall 03/03/2019    Weight loss 06/18/2019    Colitis, collagenous 02/14/2020    Age-related osteoporosis without current pathological fracture 03/10/2020    Hyperlipidemia 03/10/2020    Neck pain 09/02/2021    Closed nondisplaced intertrochanteric fracture of left femur 11/03/2021    Episodic confusion 11/03/2021    Osteoarthritis of left knee 02/23/2022     Resolved Ambulatory Problems     Diagnosis Date Noted    Acute pain of left knee 02/23/2022    Acute left ankle pain 02/23/2022     Past Medical History:   Diagnosis Date    Arthritis     Asthma     Colon polyp          PAST SURGICAL HISTORY  Past Surgical History:   Procedure Laterality Date    BACK SURGERY      FEMUR IM NAILING/RODDING Left 11/3/2021    Procedure: FEMUR INTRAMEDULLARY  NAILING/RODDING;  Surgeon: Michael Gillespie II, MD;  Location: [facility] OR;  Service: Orthopedics;  Laterality: Left;  [place] Table    ROTATOR CUFF REPAIR Left     TONSILLECTOMY      TOTAL HIP ARTHROPLASTY Right 3/4/2019    Procedure: TOTAL HIP ARTHROPLASTY ANTERIOR WITH HANA TABLE;  Surgeon: Michael Gillespie II, MD;  Location: [facility] OR;  Service: Orthopedics         FAMILY HISTORY  Family History   Problem Relation Age of Onset    Osteoporosis Mother     Thyroid cancer Mother     Lung cancer Father         HEART    Breast cancer Paternal Grandmother          SOCIAL HISTORY  Social History     Socioeconomic History    Marital status:    Tobacco Use    Smoking status: Never    Smokeless tobacco: Never   Vaping Use    Vaping status: Never Used   Substance and Sexual Activity    Alcohol use: Yes    Drug use: Defer    Sexual activity: Defer         ALLERGIES  Quinine derivatives and Sulfa antibiotics      REVIEW OF SYSTEMS  Review of Systems  Included in HPI  All systems reviewed and negative except for those discussed in HPI.      PHYSICAL EXAM    I have reviewed the triage vital signs and nursing notes.    ED Triage Vitals [05/05/25 1622]   Temp Heart Rate Resp BP SpO2   (!) 101.8 °F (38.8 °C) 99 18 153/76 96 %      Temp src Heart Rate Source Patient Position BP Location FiO2 (%)   -- -- -- -- --       Physical Exam  GENERAL: alert, no acute distress  SKIN: Warm, dry  HENT: Normocephalic, atraumatic  EYES: no scleral icterus  CV: regular rhythm, regular rate  RESPIRATORY: normal effort, lungs clear  ABDOMEN: soft, nontender, nondistended  MUSCULOSKELETAL: no deformity  NEURO: alert, moves all extremities, follows commands            LAB RESULTS  Recent Results (from the past 24 hours)   Comprehensive Metabolic Panel    Collection Time: 05/05/25  4:39 PM    Specimen: Blood   Result Value Ref Range    Glucose 113 (H) 65 - 99 mg/dL    BUN 12 8 - 23 mg/dL    Creatinine 0.54 (L) 0.57 - 1.00  mg/dL    Sodium 136 136 - 145 mmol/L    Potassium 3.6 3.5 - 5.2 mmol/L    Chloride 100 98 - 107 mmol/L    CO2 23.0 22.0 - 29.0 mmol/L    Calcium 8.3 (L) 8.6 - 10.5 mg/dL    Total Protein 6.0 6.0 - 8.5 g/dL    Albumin 3.8 3.5 - 5.2 g/dL    ALT (SGPT) 14 1 - 33 U/L    AST (SGOT) 19 1 - 32 U/L    Alkaline Phosphatase 78 39 - 117 U/L    Total Bilirubin 0.4 0.0 - 1.2 mg/dL    Globulin 2.2 gm/dL    A/G Ratio 1.7 g/dL    BUN/Creatinine Ratio 22.2 7.0 - 25.0    Anion Gap 13.0 5.0 - 15.0 mmol/L    eGFR 90.4 >60.0 mL/min/1.73   Lipase    Collection Time: 05/05/25  4:39 PM    Specimen: Blood   Result Value Ref Range    Lipase 17 13 - 60 U/L   Green Top (Gel)    Collection Time: 05/05/25  4:39 PM   Result Value Ref Range    Extra Tube Hold for add-ons.    Lavender Top    Collection Time: 05/05/25  4:39 PM   Result Value Ref Range    Extra Tube hold for add-on    Gold Top - SST    Collection Time: 05/05/25  4:39 PM   Result Value Ref Range    Extra Tube Hold for add-ons.    Light Blue Top    Collection Time: 05/05/25  4:39 PM   Result Value Ref Range    Extra Tube Hold for add-ons.    CBC Auto Differential    Collection Time: 05/05/25  4:39 PM    Specimen: Blood   Result Value Ref Range    WBC 12.49 (H) 3.40 - 10.80 10*3/mm3    RBC 4.05 3.77 - 5.28 10*6/mm3    Hemoglobin 12.4 12.0 - 15.9 g/dL    Hematocrit 36.6 34.0 - 46.6 %    MCV 90.4 79.0 - 97.0 fL    MCH 30.6 26.6 - 33.0 pg    MCHC 33.9 31.5 - 35.7 g/dL    RDW 12.6 12.3 - 15.4 %    RDW-SD 41.2 37.0 - 54.0 fl    MPV 9.3 6.0 - 12.0 fL    Platelets 248 140 - 450 10*3/mm3    Neutrophil % 84.0 (H) 42.7 - 76.0 %    Lymphocyte % 11.0 (L) 19.6 - 45.3 %    Monocyte % 4.2 (L) 5.0 - 12.0 %    Eosinophil % 0.2 (L) 0.3 - 6.2 %    Basophil % 0.3 0.0 - 1.5 %    Immature Grans % 0.3 0.0 - 0.5 %    Neutrophils, Absolute 10.48 (H) 1.70 - 7.00 10*3/mm3    Lymphocytes, Absolute 1.37 0.70 - 3.10 10*3/mm3    Monocytes, Absolute 0.53 0.10 - 0.90 10*3/mm3    Eosinophils, Absolute 0.03 0.00 - 0.40  10*3/mm3    Basophils, Absolute 0.04 0.00 - 0.20 10*3/mm3    Immature Grans, Absolute 0.04 0.00 - 0.05 10*3/mm3    nRBC 0.0 0.0 - 0.2 /100 WBC   Respiratory Panel PCR w/COVID-19(SARS-CoV-2) BECKY/RAFAEL/JEAN/PAD/COR/BHUMI In-House, NP Swab in UTM/VTM, 2 HR TAT - Swab, Nasopharynx    Collection Time: 05/05/25  4:39 PM    Specimen: Nasopharynx; Swab   Result Value Ref Range    ADENOVIRUS, PCR Not Detected Not Detected    Coronavirus 229E Not Detected Not Detected    Coronavirus HKU1 Not Detected Not Detected    Coronavirus NL63 Not Detected Not Detected    Coronavirus OC43 Not Detected Not Detected    COVID19 Not Detected Not Detected - Ref. Range    Human Metapneumovirus Not Detected Not Detected    Human Rhinovirus/Enterovirus Not Detected Not Detected    Influenza A PCR Not Detected Not Detected    Influenza B PCR Not Detected Not Detected    Parainfluenza Virus 1 Not Detected Not Detected    Parainfluenza Virus 2 Not Detected Not Detected    Parainfluenza Virus 3 Not Detected Not Detected    Parainfluenza Virus 4 Not Detected Not Detected    RSV, PCR Not Detected Not Detected    Bordetella pertussis pcr Not Detected Not Detected    Bordetella parapertussis PCR Not Detected Not Detected    Chlamydophila pneumoniae PCR Not Detected Not Detected    Mycoplasma pneumo by PCR Not Detected Not Detected   Lactic Acid, Plasma    Collection Time: 05/05/25  4:39 PM    Specimen: Blood   Result Value Ref Range    Lactate 1.6 0.5 - 2.0 mmol/L   Urinalysis With Microscopic If Indicated (No Culture) - Urine, Clean Catch    Collection Time: 05/05/25  5:47 PM    Specimen: Urine, Clean Catch   Result Value Ref Range    Color, UA Yellow Yellow, Straw    Appearance, UA Clear Clear    pH, UA 6.0 5.0 - 8.0    Specific Gravity, UA 1.014 1.005 - 1.030    Glucose, UA Negative Negative    Ketones, UA Negative Negative    Bilirubin, UA Negative Negative    Blood, UA Negative Negative    Protein, UA Negative Negative    Leuk Esterase, UA Negative  Negative    Nitrite, UA Positive (A) Negative    Urobilinogen, UA 1.0 E.U./dL 0.2 - 1.0 E.U./dL   Urinalysis, Microscopic Only - Urine, Clean Catch    Collection Time: 05/05/25  5:47 PM    Specimen: Urine, Clean Catch   Result Value Ref Range    RBC, UA 0-2 None Seen, 0-2 /HPF    WBC, UA 0-2 None Seen, 0-2 /HPF    Bacteria, UA 4+ (A) None Seen /HPF    Squamous Epithelial Cells, UA 0-2 None Seen, 0-2 /HPF    Hyaline Casts, UA None Seen None Seen /LPF    Methodology Automated Microscopy          RADIOLOGY  XR Chest 1 View  Result Date: 5/5/2025  XR CHEST 1 VW-  HISTORY: 85-year-old female with fever and nausea.  FINDINGS: There is pulmonary vascular congestion and there may be small pleural effusions. Query CHF. No focal airspace consolidations are seen to suggest pneumonia. Follow-up is recommended with 2 views of the chest.  This report was finalized on 5/5/2025 5:43 PM by Dr. Susan Steiner M.D on Workstation: [id number]          MEDICATIONS GIVEN IN ER  Medications   sodium chloride 0.9 % flush 10 mL (has no administration in time range)   sodium chloride 0.9 % flush 10 mL (has no administration in time range)   cefTRIAXone (ROCEPHIN) 2,000 mg in sodium chloride 0.9 % 100 mL MBP (2,000 mg Intravenous New Bag 5/5/25 2000)   acetaminophen (TYLENOL) tablet 1,000 mg (1,000 mg Oral Given 5/5/25 1747)         ORDERS PLACED DURING THIS VISIT:  Orders Placed This Encounter   Procedures    Blood Culture - Blood,    Blood Culture - Blood,    Respiratory Panel PCR w/COVID-19(SARS-CoV-2) BECKY/RAFAEL/JEAN/PAD/COR/BHUMI In-House, NP Swab in UTM/VTM, 2 HR TAT - Swab, Nasopharynx    XR Chest 1 View    [place] Draw    Comprehensive Metabolic Panel    Lipase    Urinalysis With Microscopic If Indicated (No Culture) - Urine, Clean Catch    CBC Auto Differential    Lactic Acid, Plasma    Urinalysis, Microscopic Only - Urine, Clean Catch    Urinalysis With Culture If Indicated -    NPO Diet NPO Type: Strict NPO    Undress & Gown    Undress &  Gown    Continuous Pulse Oximetry    Vital Signs    LHA (on-call MD unless specified) Details    Oxygen Therapy- Nasal Cannula; Titrate 1-6 LPM Per SpO2; 90 - 95%    Insert Peripheral IV    Insert Peripheral IV    Initiate Observation Status    CBC & Differential    Green Top (Gel)    Lavender Top    Gold Top - SST    Light Blue Top         OUTPATIENT MEDICATION MANAGEMENT:  Current Facility-Administered Medications Ordered in Epic   Medication Dose Route Frequency Provider Last Rate Last Admin    cefTRIAXone (ROCEPHIN) 2,000 mg in sodium chloride 0.9 % 100 mL MBP  2,000 mg Intravenous Once Addison Stuart  mL/hr at 05/05/25 2000 2,000 mg at 05/05/25 2000    sodium chloride 0.9 % flush 10 mL  10 mL Intravenous PRN Addison Stuart MD        sodium chloride 0.9 % flush 10 mL  10 mL Intravenous PRN Addison Stuart MD         Current Outpatient Medications Ordered in Epic   Medication Sig Dispense Refill    acetaminophen (TYLENOL) 325 MG tablet Take 2 tablets by mouth Every 6 (Six) Hours As Needed for Mild Pain.      albuterol sulfate  (90 Base) MCG/ACT inhaler Inhale 1 puff Every 6 (Six) Hours As Needed for Wheezing.      apixaban (ELIQUIS) 2.5 MG tablet tablet Take 1 tablet by mouth Every 12 (Twelve) Hours. Indications: Prevention of Unwanted Clot in Veins 60 tablet     Budesonide (ENTOCORT EC) 3 MG 24 hr capsule Take 3 capsules by mouth Every Morning.  12    cetirizine-pseudoephedrine (ZyrTEC-D) 5-120 MG per 12 hr tablet Take 1 tablet by mouth 2 (Two) Times a Day. 60 tablet 4    Cholecalciferol (VITAMIN D3) 2000 units tablet Take 1 tablet by mouth Daily.      dextromethorphan-guaifenesin (ROBITUSSIN-DM)  MG/5ML syrup Take 10 mL by mouth Every 4 (Four) Hours As Needed (Cough).      escitalopram (LEXAPRO) 5 MG tablet Take 1 tablet by mouth Every Night.      loperamide (IMODIUM) 2 MG capsule Take 2 capsules by mouth 1 (One) Time. Take 2 capsule after first loose stool, then 1 capsule after  each loose stool.      Multiple Vitamins-Minerals (WOMENS 50+ MULTI VITAMIN/MIN PO) Take 1 tablet by mouth Daily.      ondansetron (ZOFRAN) 4 MG tablet Take 1 tablet by mouth Every 8 (Eight) Hours As Needed for Nausea or Vomiting.      polyethylene glycol (polyethylene glycol) 17 g packet Take 17 g by mouth Daily.      zolpidem (AMBIEN) 5 MG tablet Take 1 tablet by mouth At Night As Needed for Sleep. 30 tablet 1         PROCEDURES  Procedures            PROGRESS, DATA ANALYSIS, CONSULTS, AND MEDICAL DECISION MAKING  All labs have been independently interpreted by me.  All radiology studies have been reviewed by me. All EKG's have been independently viewed and interpreted by me.  Discussion below represents my analysis of pertinent findings related to patient's condition, differential diagnosis, treatment plan and final disposition.    Differential diagnosis includes but is not limited to pneumonia, COVID, flu, viral URI, UTI.    Clinical Scores:                                       ED Course as of 05/05/25 2006   Mon May 05, 2025   1732 Chest x-ray interpreted by me and demonstrates diffuse infiltrate [MW]   1758 WBC(!): 12.49 [MW]   1758 Temp(!): 101.8 °F (38.8 °C) [MW]   1913 Chart review demonstrates patient does have some intermittent memory changes and confusion.  Patient is demonstrating this again at this time.  Workup is notable for leukocytosis of 12 and a urinalysis that is nitrite positive and 4+ bacteria however no whites and no leuks are seen raising question of is this due to UTI or not.  Patient's respiratory panel is negative and no evidence of pneumonia.  Going to go ahead and cover patient with Rocephin due to meeting sepsis criteria.  We will plan on admission for further evaluation and management. [MW]   2005 Discussed with Dr. Guzmán of [facility] who agrees to admit.  Urine sent for culture given sepsis criteria and fever of unknown etiology [MW]      ED Course User Index  [MW] Addison Stuart MD              AS OF 20:06 EDT VITALS:    BP - 128/61  HR - 111  TEMP - (!) 101.8 °F (38.8 °C)  O2 SATS - 90%    COMPLEXITY OF CARE  The patient requires admission.      DIAGNOSIS  Final diagnoses:   Fever, unspecified fever cause   Acute cystitis without hematuria         DISPOSITION  ED Disposition       ED Disposition   Decision to Admit    Condition   --    Comment   Level of Care: Telemetry [5]   Diagnosis: Fever, unknown origin [195783]   Admitting Physician: PADMINI FIERRO [[id number]]   Attending Physician: PADMINI FIERRO [[id number]]   Is patient appropriate for Inpatient Observation Unit?: No [0]                  Please note that portions of this document were completed with a voice recognition program.    Note Disclaimer: At [facility], we believe that sharing information builds trust and better relationships. You are receiving this note because you recently visited [facility]. It is possible you will see health information before a provider has talked with you about it. This kind of information can be easy to misunderstand. To help you fully understand what it means for your health, we urge you to discuss this note with your provider.         Addison Stuart MD  05/05/25 2006

## 2025-05-06 ENCOUNTER — APPOINTMENT (OUTPATIENT)
Dept: CT IMAGING | Facility: HOSPITAL | Age: 85
DRG: 871 | End: 2025-05-06
Payer: MEDICARE

## 2025-05-06 PROBLEM — G93.41 METABOLIC ENCEPHALOPATHY: Status: ACTIVE | Noted: 2025-05-06

## 2025-05-06 PROBLEM — B96.1 BACTEREMIA DUE TO KLEBSIELLA PNEUMONIAE: Status: ACTIVE | Noted: 2025-05-06

## 2025-05-06 PROBLEM — N39.0 ACUTE UTI (URINARY TRACT INFECTION): Status: ACTIVE | Noted: 2025-05-06

## 2025-05-06 PROBLEM — B96.5 BACTEREMIA DUE TO PSEUDOMONAS: Status: ACTIVE | Noted: 2025-05-06

## 2025-05-06 PROBLEM — R78.81 BACTEREMIA DUE TO PSEUDOMONAS: Status: ACTIVE | Noted: 2025-05-06

## 2025-05-06 PROBLEM — G93.40 ENCEPHALOPATHY: Status: ACTIVE | Noted: 2025-05-06

## 2025-05-06 PROBLEM — A41.9 SEPSIS: Status: ACTIVE | Noted: 2025-05-06

## 2025-05-06 LAB
ALBUMIN SERPL-MCNC: 3.4 G/DL (ref 3.5–5.2)
ALBUMIN/GLOB SERPL: 1.5 G/DL
ALP SERPL-CCNC: 70 U/L (ref 39–117)
ALT SERPL W P-5'-P-CCNC: 12 U/L (ref 1–33)
ANION GAP SERPL CALCULATED.3IONS-SCNC: 10.2 MMOL/L (ref 5–15)
AST SERPL-CCNC: 19 U/L (ref 1–32)
BACTERIA BLD CULT: ABNORMAL
BACTERIA ID TEST ISLT QL CULT: ABNORMAL
BILIRUB SERPL-MCNC: 0.6 MG/DL (ref 0–1.2)
BOTTLE TYPE: ABNORMAL
BUN SERPL-MCNC: 11 MG/DL (ref 8–23)
BUN/CREAT SERPL: 23.4 (ref 7–25)
CALCIUM SPEC-SCNC: 8.3 MG/DL (ref 8.6–10.5)
CHLORIDE SERPL-SCNC: 102 MMOL/L (ref 98–107)
CO2 SERPL-SCNC: 26.8 MMOL/L (ref 22–29)
CREAT SERPL-MCNC: 0.47 MG/DL (ref 0.57–1)
DEPRECATED RDW RBC AUTO: 40.5 FL (ref 37–54)
EGFRCR SERPLBLD CKD-EPI 2021: 93.4 ML/MIN/1.73
ERYTHROCYTE [DISTWIDTH] IN BLOOD BY AUTOMATED COUNT: 12.4 % (ref 12.3–15.4)
GLOBULIN UR ELPH-MCNC: 2.2 GM/DL
GLUCOSE SERPL-MCNC: 105 MG/DL (ref 65–99)
HCT VFR BLD AUTO: 36 % (ref 34–46.6)
HGB BLD-MCNC: 11.9 G/DL (ref 12–15.9)
L PNEUMO1 AG UR QL IA: NEGATIVE
MCH RBC QN AUTO: 30 PG (ref 26.6–33)
MCHC RBC AUTO-ENTMCNC: 33.1 G/DL (ref 31.5–35.7)
MCV RBC AUTO: 90.7 FL (ref 79–97)
PLATELET # BLD AUTO: 258 10*3/MM3 (ref 140–450)
PMV BLD AUTO: 9.6 FL (ref 6–12)
POTASSIUM SERPL-SCNC: 3.8 MMOL/L (ref 3.5–5.2)
PROT SERPL-MCNC: 5.6 G/DL (ref 6–8.5)
RBC # BLD AUTO: 3.97 10*6/MM3 (ref 3.77–5.28)
S PNEUM AG SPEC QL LA: NEGATIVE
SODIUM SERPL-SCNC: 139 MMOL/L (ref 136–145)
WBC NRBC COR # BLD AUTO: 14.98 10*3/MM3 (ref 3.4–10.8)

## 2025-05-06 PROCEDURE — 25810000003 SODIUM CHLORIDE 0.9 % SOLUTION: Performed by: INTERNAL MEDICINE

## 2025-05-06 PROCEDURE — 97530 THERAPEUTIC ACTIVITIES: CPT

## 2025-05-06 PROCEDURE — 85027 COMPLETE CBC AUTOMATED: CPT | Performed by: NURSE PRACTITIONER

## 2025-05-06 PROCEDURE — 80053 COMPREHEN METABOLIC PANEL: CPT | Performed by: NURSE PRACTITIONER

## 2025-05-06 PROCEDURE — 97166 OT EVAL MOD COMPLEX 45 MIN: CPT

## 2025-05-06 PROCEDURE — 25010000002 CEFEPIME PER 500 MG: Performed by: INTERNAL MEDICINE

## 2025-05-06 PROCEDURE — 97162 PT EVAL MOD COMPLEX 30 MIN: CPT

## 2025-05-06 PROCEDURE — 97535 SELF CARE MNGMENT TRAINING: CPT

## 2025-05-06 PROCEDURE — 25810000003 SODIUM CHLORIDE 0.9 % SOLUTION: Performed by: NURSE PRACTITIONER

## 2025-05-06 RX ORDER — ONDANSETRON 2 MG/ML
4 INJECTION INTRAMUSCULAR; INTRAVENOUS EVERY 6 HOURS PRN
Status: DISCONTINUED | OUTPATIENT
Start: 2025-05-06 | End: 2025-05-09 | Stop reason: HOSPADM

## 2025-05-06 RX ORDER — ACETAMINOPHEN 650 MG/1
650 SUPPOSITORY RECTAL EVERY 4 HOURS PRN
Status: DISCONTINUED | OUTPATIENT
Start: 2025-05-06 | End: 2025-05-09 | Stop reason: HOSPADM

## 2025-05-06 RX ORDER — BISACODYL 10 MG
10 SUPPOSITORY, RECTAL RECTAL DAILY PRN
Status: DISCONTINUED | OUTPATIENT
Start: 2025-05-06 | End: 2025-05-09 | Stop reason: HOSPADM

## 2025-05-06 RX ORDER — CHOLECALCIFEROL (VITAMIN D3) 25 MCG
2000 TABLET ORAL DAILY
Status: DISCONTINUED | OUTPATIENT
Start: 2025-05-06 | End: 2025-05-09 | Stop reason: HOSPADM

## 2025-05-06 RX ORDER — ACETAMINOPHEN 325 MG/1
650 TABLET ORAL EVERY 4 HOURS PRN
Status: DISCONTINUED | OUTPATIENT
Start: 2025-05-06 | End: 2025-05-09 | Stop reason: HOSPADM

## 2025-05-06 RX ORDER — ACETAMINOPHEN 160 MG/5ML
650 SOLUTION ORAL EVERY 4 HOURS PRN
Status: DISCONTINUED | OUTPATIENT
Start: 2025-05-06 | End: 2025-05-09 | Stop reason: HOSPADM

## 2025-05-06 RX ORDER — LOPERAMIDE HYDROCHLORIDE 2 MG/1
2 CAPSULE ORAL 4 TIMES DAILY PRN
COMMUNITY

## 2025-05-06 RX ORDER — SODIUM CHLORIDE 0.9 % (FLUSH) 0.9 %
10 SYRINGE (ML) INJECTION AS NEEDED
Status: DISCONTINUED | OUTPATIENT
Start: 2025-05-06 | End: 2025-05-09 | Stop reason: HOSPADM

## 2025-05-06 RX ORDER — NITROGLYCERIN 0.4 MG/1
0.4 TABLET SUBLINGUAL
Status: DISCONTINUED | OUTPATIENT
Start: 2025-05-06 | End: 2025-05-09 | Stop reason: HOSPADM

## 2025-05-06 RX ORDER — FAMOTIDINE 20 MG/1
20 TABLET, FILM COATED ORAL 2 TIMES DAILY PRN
Status: DISCONTINUED | OUTPATIENT
Start: 2025-05-06 | End: 2025-05-09 | Stop reason: HOSPADM

## 2025-05-06 RX ORDER — SODIUM CHLORIDE 9 MG/ML
100 INJECTION, SOLUTION INTRAVENOUS CONTINUOUS
Status: DISCONTINUED | OUTPATIENT
Start: 2025-05-06 | End: 2025-05-06

## 2025-05-06 RX ORDER — SODIUM CHLORIDE 0.9 % (FLUSH) 0.9 %
10 SYRINGE (ML) INJECTION EVERY 12 HOURS SCHEDULED
Status: DISCONTINUED | OUTPATIENT
Start: 2025-05-06 | End: 2025-05-09 | Stop reason: HOSPADM

## 2025-05-06 RX ORDER — POLYETHYLENE GLYCOL 3350 17 G/17G
17 POWDER, FOR SOLUTION ORAL DAILY PRN
Status: DISCONTINUED | OUTPATIENT
Start: 2025-05-06 | End: 2025-05-09 | Stop reason: HOSPADM

## 2025-05-06 RX ORDER — AMOXICILLIN 250 MG
2 CAPSULE ORAL 2 TIMES DAILY PRN
Status: DISCONTINUED | OUTPATIENT
Start: 2025-05-06 | End: 2025-05-09 | Stop reason: HOSPADM

## 2025-05-06 RX ORDER — BUDESONIDE 3 MG/1
9 CAPSULE, COATED PELLETS ORAL EVERY MORNING
Status: DISCONTINUED | OUTPATIENT
Start: 2025-05-06 | End: 2025-05-09 | Stop reason: HOSPADM

## 2025-05-06 RX ORDER — SODIUM CHLORIDE 9 MG/ML
40 INJECTION, SOLUTION INTRAVENOUS AS NEEDED
Status: DISCONTINUED | OUTPATIENT
Start: 2025-05-06 | End: 2025-05-09 | Stop reason: HOSPADM

## 2025-05-06 RX ORDER — CETIRIZINE HYDROCHLORIDE 10 MG/1
5 TABLET ORAL DAILY
Status: DISCONTINUED | OUTPATIENT
Start: 2025-05-06 | End: 2025-05-09 | Stop reason: HOSPADM

## 2025-05-06 RX ORDER — BISACODYL 5 MG/1
5 TABLET, DELAYED RELEASE ORAL DAILY PRN
Status: DISCONTINUED | OUTPATIENT
Start: 2025-05-06 | End: 2025-05-09 | Stop reason: HOSPADM

## 2025-05-06 RX ORDER — ALBUTEROL SULFATE 0.83 MG/ML
2.5 SOLUTION RESPIRATORY (INHALATION) EVERY 6 HOURS PRN
Status: DISCONTINUED | OUTPATIENT
Start: 2025-05-06 | End: 2025-05-09 | Stop reason: HOSPADM

## 2025-05-06 RX ORDER — MULTIPLE VITAMINS W/ MINERALS TAB 9MG-400MCG
1 TAB ORAL DAILY
Status: DISCONTINUED | OUTPATIENT
Start: 2025-05-06 | End: 2025-05-09 | Stop reason: HOSPADM

## 2025-05-06 RX ADMIN — CETIRIZINE HYDROCHLORIDE 5 MG: 10 TABLET, FILM COATED ORAL at 11:56

## 2025-05-06 RX ADMIN — SODIUM CHLORIDE 100 ML/HR: 9 INJECTION, SOLUTION INTRAVENOUS at 03:08

## 2025-05-06 RX ADMIN — Medication 10 ML: at 07:46

## 2025-05-06 RX ADMIN — Medication 2000 UNITS: at 09:35

## 2025-05-06 RX ADMIN — ACETAMINOPHEN 325MG 650 MG: 325 TABLET ORAL at 09:34

## 2025-05-06 RX ADMIN — CEFEPIME 2000 MG: 2 INJECTION, POWDER, FOR SOLUTION INTRAVENOUS at 21:24

## 2025-05-06 RX ADMIN — Medication 2.5 MG: at 21:24

## 2025-05-06 RX ADMIN — Medication 1 TABLET: at 09:35

## 2025-05-06 RX ADMIN — Medication 10 ML: at 21:25

## 2025-05-06 RX ADMIN — ACETAMINOPHEN 325MG 650 MG: 325 TABLET ORAL at 15:55

## 2025-05-06 RX ADMIN — CEFEPIME 2000 MG: 2 INJECTION, POWDER, FOR SOLUTION INTRAVENOUS at 11:57

## 2025-05-06 RX ADMIN — SODIUM CHLORIDE 250 ML: 9 INJECTION, SOLUTION INTRAVENOUS at 07:45

## 2025-05-06 RX ADMIN — BUDESONIDE 9 MG: 3 CAPSULE ORAL at 11:56

## 2025-05-06 NOTE — NURSING NOTE
Nursing report ED to floor  [facility]  85 y.o.  female    HPI :  HPI  Stated Reason for Visit: fever, chills, nausea starting today  History Obtained From: EMS, patient    Chief Complaint  Chief Complaint   Patient presents with    Fever    Nausea       Admitting doctor:   Nelson Guzmán DO    Admitting diagnosis:   The primary encounter diagnosis was Fever, unspecified fever cause. A diagnosis of Acute cystitis without hematuria was also pertinent to this visit.    Code status:   Current Code Status       Date Active Code Status Order ID Comments User Context       Prior            Allergies:   Quinine derivatives and Sulfa antibiotics    Isolation:   No active isolations    Intake and Output    Intake/Output Summary (Last 24 hours) at 5/5/2025 2035  Last data filed at 5/5/2025 1619  Gross per 24 hour   Intake 600 ml   Output --   Net 600 ml       Weight:   There were no vitals filed for this visit.    Most recent vitals:   Vitals:    05/05/25 1901 05/05/25 1902 05/05/25 1931 05/05/25 2001   BP: 128/61  116/53 106/57   Pulse:  111 108 109   Resp:       Temp:       SpO2:  90% 93% 90%       Active LDAs/IV Access:   Lines, Drains & Airways       Active LDAs       Name Placement date Placement time Site Days    Peripheral IV 05/05/25 1619 20 G Anterior;Right Forearm 05/05/25  1619  Forearm  less than 1                    Labs (abnormal labs have a star):   Labs Reviewed   COMPREHENSIVE METABOLIC PANEL - Abnormal; Notable for the following components:       Result Value    Glucose 113 (*)     Creatinine 0.54 (*)     Calcium 8.3 (*)     All other components within normal limits    Narrative:     GFR Categories in Chronic Kidney Disease (CKD)              GFR Category          GFR (mL/min/1.73)    Interpretation  G1                    90 or greater        Normal or high (1)  G2                    60-89                Mild decrease (1)  G3a                   45-59                Mild to moderate decrease  G3b                    30-44                Moderate to severe decrease  G4                    15-29                Severe decrease  G5                    14 or less           Kidney failure    (1)In the absence of evidence of kidney disease, neither GFR category G1 or G2 fulfill the criteria for CKD.    eGFR calculation 2021 CKD-EPI creatinine equation, which does not include race as a factor   URINALYSIS W/ MICROSCOPIC IF INDICATED (NO CULTURE) - Abnormal; Notable for the following components:    Nitrite, UA Positive (*)     All other components within normal limits   CBC WITH AUTO DIFFERENTIAL - Abnormal; Notable for the following components:    WBC 12.49 (*)     Neutrophil % 84.0 (*)     Lymphocyte % 11.0 (*)     Monocyte % 4.2 (*)     Eosinophil % 0.2 (*)     Neutrophils, Absolute 10.48 (*)     All other components within normal limits   URINALYSIS, MICROSCOPIC ONLY - Abnormal; Notable for the following components:    Bacteria, UA 4+ (*)     All other components within normal limits   RESPIRATORY PANEL PCR W/ COVID-19 (SARS-COV-2), NP SWAB IN UTM/VTP, 2 HR TAT - Normal    Narrative:     In the setting of a positive respiratory panel with a viral infection PLUS a negative procalcitonin without other underlying concern for bacterial infection, consider observing off antibiotics or discontinuation of antibiotics and continue supportive care. If the respiratory panel is positive for atypical bacterial infection (Bordetella pertussis, Chlamydophila pneumoniae, or Mycoplasma pneumoniae), consider antibiotic de-escalation to target atypical bacterial infection.   LIPASE - Normal   LACTIC ACID, PLASMA - Normal   BLOOD CULTURE   BLOOD CULTURE   RAINBOW DRAW    Narrative:     The following orders were created for panel order [place] Draw.  Procedure                               Abnormality         Status                     ---------                               -----------         ------                     Green Top (Gel)[[id number]]                                   Final result               Lavender Top[[id number]]                                     Final result               Gold Top - SST[[id number]]                                   Final result               Light Blue Top[[id number]]                                   Final result                 Please view results for these tests on the individual orders.   URINALYSIS W/ CULTURE IF INDICATED   CBC AND DIFFERENTIAL    Narrative:     The following orders were created for panel order CBC & Differential.  Procedure                               Abnormality         Status                     ---------                               -----------         ------                     CBC Auto Differential[[id number]]        Abnormal            Final result                 Please view results for these tests on the individual orders.   GREEN TOP   LAVENDER TOP   GOLD TOP - SST   LIGHT BLUE TOP       EKG:   No orders to display       Meds given in ED:   Medications   sodium chloride 0.9 % flush 10 mL (has no administration in time range)   sodium chloride 0.9 % flush 10 mL (has no administration in time range)   acetaminophen (TYLENOL) tablet 1,000 mg (1,000 mg Oral Given 5/5/25 1747)   cefTRIAXone (ROCEPHIN) 2,000 mg in sodium chloride 0.9 % 100 mL MBP (2,000 mg Intravenous New Bag 5/5/25 2000)       Imaging results:  No radiology results for the last day    Ambulatory status:   - assist x2    Social issues:   Social History     Socioeconomic History    Marital status:    Tobacco Use    Smoking status: Never    Smokeless tobacco: Never   Vaping Use    Vaping status: Never Used   Substance and Sexual Activity    Alcohol use: Yes    Drug use: Defer    Sexual activity: Defer       Peripheral Neurovascular  Peripheral Neurovascular (Adult)  Peripheral Neurovascular WDL: WDL    Neuro Cognitive  Neuro Cognitive (Adult)  Cognitive/Neuro/Behavioral WDL: WDL    Learning  Learning Assessment  Learning  Readiness and Ability: no barriers identified    Respiratory  Respiratory WDL  Respiratory WDL: WDL    Abdominal Pain       Pain Assessments  Pain (Adult)  Preferred Pain Scale: FACES (Hassan-Baker FACES Pain Rating Scale)  FACES Pain Rating: Rest: 6-->hurts even more  Pain Location: back  Pain Side/Orientation: lower    NIH Stroke Scale       Bobby Helms RN  05/05/25 20:35 EDT

## 2025-05-06 NOTE — PLAN OF CARE
Goal Outcome Evaluation:  Plan of Care Reviewed With: patient        Progress: improving  Outcome Evaluation: Patient is a 85 y.o. female admitted with c/o acute fever, chills, and nausea. Sepsis (+). Pmhx includes arthritis, asthma. At baseline, [facility] , being (I) for ADLs and mob usng rwx ad SC for bathing. She endorses feeling better today but 5/10 headache- RN recently gave tylenol. Today, pt presents to OT Eval with slight decline in baseline function demo'ing decreased endurance, balance and weakness. She reqs Ronaldo initially, progressing to Cga with rwx for STS t/o session. Mod A for donning/doffing socks. Cga/ rwx to mobilize to BR and complete toileting using 3-in-1 over commode to elevate commode height and provide stability. Sba for G&H standing supported at sink. IPOT will continue to monitor to maximize patient safety and (I) w/ ADLs/ xfers in order for pt to safely dc home w/ HH and improved function to baseline performace.    Anticipated Discharge Disposition (OT): home with assist, home with home health, home

## 2025-05-06 NOTE — CASE MANAGEMENT/SOCIAL WORK
Discharge Planning Assessment  [facility]     Patient Name: Samantha Davila  MRN: [id number]  Today's Date: 5/6/2025    Admit Date: 5/5/2025    Plan: Home to [facility] with    Discharge Needs Assessment       Row Name 05/06/25 1410       Living Environment    People in Home spouse    Current Living Arrangements assisted living facility    Potentially Unsafe Housing Conditions none    Primary Care Provided by self    Family Caregiver if Needed spouse    Quality of Family Relationships involved;helpful    Able to Return to Prior Arrangements yes       Resource/Environmental Concerns    Resource/Environmental Concerns none    Transportation Concerns none       Transition Planning    Patient/Family Anticipates Transition to home with family    Patient/Family Anticipated Services at Transition none    Transportation Anticipated family or friend will provide       Discharge Needs Assessment    Readmission Within the Last 30 Days no previous admission in last 30 days    Equipment Currently Used at Home walker, nini;grab bar;shower chair    Concerns to be Addressed adjustment to diagnosis/illness    Anticipated Changes Related to Illness none    Equipment Needed After Discharge none                   Discharge Plan       Row Name 05/06/25 3749       Plan    Plan Comments Spoke with pt at bedside, introduced self and explained CCP role and verified the face sheet and pharmacy information.  Pt lives at [facility] with  John, she states she manages her own medications but uses their pharmacy. Pt states she is mostly IADL's, she has rwx, s/c, and gb. She cannot recall HH or SNF history, maybe [facility] but she is planning home and will need transport arranged.  Spoke with Isabela, no report needed, she confirmed med mgmt. by pt, would like copy of DC summary sent with pt though. Updated her on estimated LOS and mobility.  CCP will follow - Savita FUNEZ      Row Name 05/06/25 1411       Plan    Plan  Home to [facility] with     Patient/Family in Agreement with Plan yes                    Expected Discharge Date and Time       Expected Discharge Date Expected Discharge Time    May 7, 2025            Demographic Summary       Row Name 05/06/25 1409       General Information    Admission Type observation                   Functional Status       Row Name 05/06/25 1409       Functional Status    Usual Activity Tolerance good    Current Activity Tolerance good       Assessment of Health Literacy    Health Literacy Moderate       Functional Status, IADL    Medications independent;assistive person    Meal Preparation assistive person    Housekeeping assistive person    Laundry assistive person    Shopping assistive person    IADL Comments MARY       Mental Status    General Appearance WDL WDL       Mental Status Summary    Recent Changes in Mental Status/Cognitive Functioning no changes                   Psychosocial    No documentation.                  Abuse/Neglect    No documentation.                  Legal       Row Name 05/06/25 1410       Financial/Legal    Who Manages Finances if Patient Unable                    Substance Abuse    No documentation.                  Patient Forms    No documentation.                     Savita Dumont RN

## 2025-05-06 NOTE — CONSULTS
Referring Provider: Nelson Guzmán DO      Subjective   History of present illness: Very nice 85-year-old with a history of osteoarthritis status post total hip arthroplasty, left femur nailing, rotator cuff repair and back surgery admitted on 5/5 with weakness and possible sepsis.  We are asked evaluate for positive blood cultures.  Patient limited historian and unsure how long she has been feeling sick but did note symptoms of lightheadedness especially in the morning and some right sided flank pain.  No urinary symptoms or bowel symptoms.  Upon arrival in the ER white count was elevated to 12.5 which is now increased to 14.98.  Liver function tests were normal and urinalysis was without pyuria.  Blood culture growing gram-negative rods with BCID pending  No prior microbiology.    Physical Exam:   Vital Signs   Temp:  [97.3 °F (36.3 °C)-101.8 °F (38.8 °C)] 99.1 °F (37.3 °C)  Heart Rate:  [] 82  Resp:  [18] 18  BP: ()/(45-76) 95/45    GENERAL: Awake and alert, in no acute distress.   HEENT: Oropharynx is clear. Hearing is grossly normal.   EYES: . No conjunctival injection. No lid lag.   LUNGS:normal respiratory effort.   SKIN: no cutaneous eruptions in exposed areas  PSYCHIATRIC: Appropriate mood, affect, insight, and judgment.     Results Review:  White count 12.1 on admission now 14.98  Creatinine 0.47  Liver function test normal  Urinalysis nitrate positive, 0-2 white blood cells  Blood culture 1/2 E. coli  Legionella and strep urinary antigen and respiratory pathogen panel negative  Chest x-ray independently interpreted: Congestion with possible small effusions but no lobar pneumonia       A/p  1.  Gram-negative septicemia  2.  Infection control: Contact isolation for Candida auris rule out    Continue ceftriaxone targeting gram-negative rods.  Probable GI or  source and  less favored given negative urinalysis.  We will check CT abdomen pelvis to assess for deep nidus of infection.    BCx now  with K oxytoca and Pseduomonas. DC ceftriaxone in favor of cefepime    Thank you for this consult.  We will continue to follow along and tailor antibiotics as the patient's clinical course evolves.

## 2025-05-06 NOTE — SIGNIFICANT NOTE
Orthostatic BPs as followed:    05/06/25 1137 05/06/25 1141 05/06/25 1144   Vital Signs   Heart Rate 75 79 83   Heart Rate Source Monitor Monitor Monitor   /65 110/63 115/64   Noninvasive MAP (mmHg) 72 78 80   BP Location Left arm Left leg Left arm   BP Method Automatic Automatic Automatic   Patient Position Lying Standing Standing     Pt c/o lightheadedness upon standing.

## 2025-05-06 NOTE — H&P
Patient Name:  Samantha Davila  YOB: 1940  MRN:  [id number]  Admit Date:  5/5/2025  Patient Care Team:  Michelle Lyons MD as PCP - General (Internal Medicine)      Subjective   History Present Illness     Chief Complaint   Patient presents with    Fever    Nausea       Ms. Davila is a 85 y.o. female with a history of insomnia, osteoarthritis both knees, asthma, MDD that presented last night to [facility] complaining of acute fever, chills and nausea beginning earlier yesterday.  She denied other symptoms.  She was febrile on arrival with a temp of 101.8.  She had a white count of 12.4, normal lactate.  UA was fairly benign, positive for nitrites and 4+ bacteria.  Chest x-ray noted no pneumonia or consolidation but possible vascular congestion or pleural effusions.  CT of the head was negative acute.  She was started on IV cefepime and received a small fluid bolus and was admitted to the hospital for further evaluation and treatment.    Patient denies any abdominal pain or diarrhea.  She reports chills and sweats at home, unsure of onset and did not check her temperature.  She had some nausea yesterday morning without vomiting.  Denies cough, shortness of breath.  She complains of dizziness and lightheadedness in the morning, more than 1 day but unsure for how long.  She has pain in the right side and right lower back.  No dysuria, urinary frequency, urinary odor.  She does not eat very much, this is chronic but worse over the last couple of months due to a new cook at her living facility and she does not like the food.  She has not lost any weight that she is aware of.  She is alert and oriented but does appear to be a limited historian, providing some vague answers to ROS questions but elaborating on 's medical history.  She denies any wounds, rashes or cuts.  She states chronic problems with her knees and legs, sometimes swelling or itching and arthritis.  She states  this is due to having frostbite when she was 10 years old and she has had poor circulation ever since.  Her legs are currently at baseline appearance with no increase in swelling.    She does not use tobacco or alcohol.  She denies medical problems other than chronic knee pain and frost bite as a child.     Review of Systems   Constitutional:  Positive for chills and diaphoresis. Negative for activity change and appetite change.   HENT:  Negative for congestion.    Respiratory:  Negative for cough and shortness of breath.    Gastrointestinal:  Positive for nausea. Negative for vomiting.   Genitourinary:  Positive for flank pain. Negative for difficulty urinating and dysuria.   Skin:  Negative for rash and wound.   Neurological:  Positive for dizziness and light-headedness. Negative for weakness.        Personal History     Past Medical History:   Diagnosis Date    Arthritis     Asthma     Colitis, collagenous     Colon polyp     Insomnia      Past Surgical History:   Procedure Laterality Date    BACK SURGERY      FEMUR IM NAILING/RODDING Left 11/3/2021    Procedure: FEMUR INTRAMEDULLARY NAILING/RODDING;  Surgeon: Michael Gillespie II, MD;  Location: [facility];  Service: Orthopedics;  Laterality: Left;  [place] Table    ROTATOR CUFF REPAIR Left     TONSILLECTOMY      TOTAL HIP ARTHROPLASTY Right 3/4/2019    Procedure: TOTAL HIP ARTHROPLASTY ANTERIOR WITH HANA TABLE;  Surgeon: Michael Gillespie II, MD;  Location: [facility] OR;  Service: Orthopedics     Family History   Problem Relation Age of Onset    Osteoporosis Mother     Thyroid cancer Mother     Lung cancer Father         HEART    Breast cancer Paternal Grandmother      Social History     Tobacco Use    Smoking status: Never    Smokeless tobacco: Never   Vaping Use    Vaping status: Never Used   Substance Use Topics    Alcohol use: Yes    Drug use: Defer     No current facility-administered medications on file prior to encounter.     Current  Outpatient Medications on File Prior to Encounter   Medication Sig Dispense Refill    acetaminophen (TYLENOL) 325 MG tablet Take 2 tablets by mouth Every 6 (Six) Hours As Needed for Mild Pain.      Budesonide (ENTOCORT EC) 3 MG 24 hr capsule Take 3 capsules by mouth Every Morning.  12    cetirizine-pseudoephedrine (ZyrTEC-D) 5-120 MG per 12 hr tablet Take 1 tablet by mouth 2 (Two) Times a Day. 60 tablet 4    Cholecalciferol (VITAMIN D3) 2000 units tablet Take 1 tablet by mouth Daily.      loperamide (IMODIUM) 2 MG capsule Take 1 capsule by mouth 4 (Four) Times a Day As Needed for Diarrhea.      Multiple Vitamins-Minerals (WOMENS 50+ MULTI VITAMIN/MIN PO) Take 1 tablet by mouth Daily.      zolpidem (AMBIEN) 5 MG tablet Take 1 tablet by mouth At Night As Needed for Sleep. 30 tablet 1    [DISCONTINUED] albuterol sulfate  (90 Base) MCG/ACT inhaler Inhale 1 puff Every 6 (Six) Hours As Needed for Wheezing.      [DISCONTINUED] escitalopram (LEXAPRO) 5 MG tablet Take 1 tablet by mouth Every Night.      [DISCONTINUED] ondansetron (ZOFRAN) 4 MG tablet Take 1 tablet by mouth Every 8 (Eight) Hours As Needed for Nausea or Vomiting.      [DISCONTINUED] polyethylene glycol (polyethylene glycol) 17 g packet Take 17 g by mouth Daily.      [DISCONTINUED] apixaban (ELIQUIS) 2.5 MG tablet tablet Take 1 tablet by mouth Every 12 (Twelve) Hours. Indications: Prevention of Unwanted Clot in Veins (Patient not taking: Reported on 5/5/2025) 60 tablet     [DISCONTINUED] dextromethorphan-guaifenesin (ROBITUSSIN-DM)  MG/5ML syrup Take 10 mL by mouth Every 4 (Four) Hours As Needed (Cough).      [DISCONTINUED] loperamide (IMODIUM) 2 MG capsule Take 2 capsules by mouth 1 (One) Time. Take 2 capsule after first loose stool, then 1 capsule after each loose stool.       Allergies   Allergen Reactions    Quinine Derivatives     Sulfa Antibiotics        Objective    Objective     Vital Signs  Temp:  [97.3 °F (36.3 °C)-101.8 °F (38.8 °C)]  99.1 °F (37.3 °C)  Heart Rate:  [] 83  Resp:  [18] 18  BP: ()/(45-76) 115/64  SpO2:  [90 %-96 %] 90 %  on  Flow (L/min) (Oxygen Therapy):  [1] 1;   Device (Oxygen Therapy): room air  Body mass index is 23.87 kg/m².    Physical Exam  Constitutional:       General: She is not in acute distress.     Appearance: She is not ill-appearing, toxic-appearing or diaphoretic.      Comments: Elderly appearing.   HENT:      Head: Normocephalic and atraumatic.      Mouth/Throat:      Mouth: Mucous membranes are moist.   Eyes:      Extraocular Movements: Extraocular movements intact.      Pupils: Pupils are equal, round, and reactive to light.      Comments: Wearing glasses   Cardiovascular:      Rate and Rhythm: Normal rate and regular rhythm.      Pulses: Normal pulses.      Heart sounds: Normal heart sounds.   Pulmonary:      Effort: Pulmonary effort is normal.      Breath sounds: Normal breath sounds.   Abdominal:      General: Bowel sounds are normal. There is no distension.      Palpations: Abdomen is soft.   Musculoskeletal:         General: No swelling or tenderness. Normal range of motion.   Skin:     General: Skin is warm and dry.   Neurological:      General: No focal deficit present.      Mental Status: She is alert and oriented to person, place, and time.         Results Review:  I reviewed the patient's new clinical results.      Lab Results (last 24 hours)       Procedure Component Value Units Date/Time    CBC & Differential [[id number]]  (Abnormal) Collected: 05/05/25 1639    Specimen: Blood Updated: 05/05/25 9303    Narrative:      The following orders were created for panel order CBC & Differential.  Procedure                               Abnormality         Status                     ---------                               -----------         ------                     CBC Auto Differential[[id number]]        Abnormal            Final result                 Please view results for these tests on the  individual orders.    Comprehensive Metabolic Panel [[id number]]  (Abnormal) Collected: 05/05/25 1639    Specimen: Blood Updated: 05/05/25 1715     Glucose 113 mg/dL      BUN 12 mg/dL      Creatinine 0.54 mg/dL      Sodium 136 mmol/L      Potassium 3.6 mmol/L      Chloride 100 mmol/L      CO2 23.0 mmol/L      Calcium 8.3 mg/dL      Total Protein 6.0 g/dL      Albumin 3.8 g/dL      ALT (SGPT) 14 U/L      AST (SGOT) 19 U/L      Alkaline Phosphatase 78 U/L      Total Bilirubin 0.4 mg/dL      Globulin 2.2 gm/dL      A/G Ratio 1.7 g/dL      BUN/Creatinine Ratio 22.2     Anion Gap 13.0 mmol/L      eGFR 90.4 mL/min/1.73     Narrative:      GFR Categories in Chronic Kidney Disease (CKD)              GFR Category          GFR (mL/min/1.73)    Interpretation  G1                    90 or greater        Normal or high (1)  G2                    60-89                Mild decrease (1)  G3a                   45-59                Mild to moderate decrease  G3b                   30-44                Moderate to severe decrease  G4                    15-29                Severe decrease  G5                    14 or less           Kidney failure    (1)In the absence of evidence of kidney disease, neither GFR category G1 or G2 fulfill the criteria for CKD.    eGFR calculation 2021 CKD-EPI creatinine equation, which does not include race as a factor    Lipase [[id number]]  (Normal) Collected: 05/05/25 1639    Specimen: Blood Updated: 05/05/25 1715     Lipase 17 U/L     CBC Auto Differential [[id number]]  (Abnormal) Collected: 05/05/25 1639    Specimen: Blood Updated: 05/05/25 1653     WBC 12.49 10*3/mm3      RBC 4.05 10*6/mm3      Hemoglobin 12.4 g/dL      Hematocrit 36.6 %      MCV 90.4 fL      MCH 30.6 pg      MCHC 33.9 g/dL      RDW 12.6 %      RDW-SD 41.2 fl      MPV 9.3 fL      Platelets 248 10*3/mm3      Neutrophil % 84.0 %      Lymphocyte % 11.0 %      Monocyte % 4.2 %      Eosinophil % 0.2 %      Basophil % 0.3 %      Immature  Grans % 0.3 %      Neutrophils, Absolute 10.48 10*3/mm3      Lymphocytes, Absolute 1.37 10*3/mm3      Monocytes, Absolute 0.53 10*3/mm3      Eosinophils, Absolute 0.03 10*3/mm3      Basophils, Absolute 0.04 10*3/mm3      Immature Grans, Absolute 0.04 10*3/mm3      nRBC 0.0 /100 WBC     Respiratory Panel PCR w/COVID-19(SARS-CoV-2) BECKY/RAFAEL/JEAN/PAD/COR/BHUMI In-House, NP Swab in UTM/VTM, 2 HR TAT - Swab, Nasopharynx [[id number]]  (Normal) Collected: 05/05/25 1639    Specimen: Swab from Nasopharynx Updated: 05/05/25 1758     ADENOVIRUS, PCR Not Detected     Coronavirus 229E Not Detected     Coronavirus HKU1 Not Detected     Coronavirus NL63 Not Detected     Coronavirus OC43 Not Detected     COVID19 Not Detected     Human Metapneumovirus Not Detected     Human Rhinovirus/Enterovirus Not Detected     Influenza A PCR Not Detected     Influenza B PCR Not Detected     Parainfluenza Virus 1 Not Detected     Parainfluenza Virus 2 Not Detected     Parainfluenza Virus 3 Not Detected     Parainfluenza Virus 4 Not Detected     RSV, PCR Not Detected     Bordetella pertussis pcr Not Detected     Bordetella parapertussis PCR Not Detected     Chlamydophila pneumoniae PCR Not Detected     Mycoplasma pneumo by PCR Not Detected    Narrative:      In the setting of a positive respiratory panel with a viral infection PLUS a negative procalcitonin without other underlying concern for bacterial infection, consider observing off antibiotics or discontinuation of antibiotics and continue supportive care. If the respiratory panel is positive for atypical bacterial infection (Bordetella pertussis, Chlamydophila pneumoniae, or Mycoplasma pneumoniae), consider antibiotic de-escalation to target atypical bacterial infection.    Lactic Acid, Plasma [[id number]]  (Normal) Collected: 05/05/25 1639    Specimen: Blood Updated: 05/05/25 1714     Lactate 1.6 mmol/L     Blood Culture - Blood, Arm, Right [[id number]]  (Abnormal) Collected: 05/05/25 6799     Specimen: Blood from Arm, Right Updated: 05/06/25 0849     Blood Culture Abnormal Stain     Gram Stain Aerobic Bottle Gram negative bacilli    Blood Culture ID, PCR - Blood, Arm, Right [[id number]]  (Abnormal) Collected: 05/05/25 1649    Specimen: Blood from Arm, Right Updated: 05/06/25 1047     BCID, PCR Klebsiella oxytoca. Identification by BCID2 PCR     BCID, PCR 2 Pseudomonas aeruginosa. Identification by BCID2 PCR     BOTTLE TYPE Aerobic Bottle    Narrative:      No resistance genes detected.    Blood Culture - Blood, Arm, Left [[id number]]  (Abnormal) Collected: 05/05/25 1654    Specimen: Blood from Arm, Left Updated: 05/06/25 1344     Blood Culture Abnormal Stain     Gram Stain Anaerobic Bottle Gram negative bacilli      Aerobic Bottle Gram negative bacilli    Urinalysis With Microscopic If Indicated (No Culture) - Urine, Clean Catch [[id number]]  (Abnormal) Collected: 05/05/25 1747    Specimen: Urine, Clean Catch Updated: 05/05/25 1816     Color, UA Yellow     Appearance, UA Clear     pH, UA 6.0     Specific Gravity, UA 1.014     Glucose, UA Negative     Ketones, UA Negative     Bilirubin, UA Negative     Blood, UA Negative     Protein, UA Negative     Leuk Esterase, UA Negative     Nitrite, UA Positive     Urobilinogen, UA 1.0 E.U./dL    Urinalysis, Microscopic Only - Urine, Clean Catch [[id number]]  (Abnormal) Collected: 05/05/25 1747    Specimen: Urine, Clean Catch Updated: 05/05/25 1816     RBC, UA 0-2 /HPF      WBC, UA 0-2 /HPF      Bacteria, UA 4+ /HPF      Squamous Epithelial Cells, UA 0-2 /HPF      Hyaline Casts, UA None Seen /LPF      Methodology Automated Microscopy    S. Pneumo Ag Urine or CSF - Urine, Urine, Clean Catch [[id number]]  (Normal) Collected: 05/05/25 1747    Specimen: Urine, Clean Catch Updated: 05/06/25 0817     Strep Pneumo Ag Negative    Legionella Antigen, Urine - Urine, Urine, Clean Catch [[id number]]  (Normal) Collected: 05/05/25 1747    Specimen: Urine, Clean Catch Updated: 05/06/25  0817     LEGIONELLA ANTIGEN, URINE Negative    CANDIDA AURIS PCR - Swab, Axilla Right, Axilla Left and Groin [[id number]] Collected: 05/05/25 2340    Specimen: Swab from Axilla Right, Axilla Left and Groin Updated: 05/05/25 2342    CBC (No Diff) [[id number]]  (Abnormal) Collected: 05/06/25 0537    Specimen: Blood Updated: 05/06/25 0641     WBC 14.98 10*3/mm3      RBC 3.97 10*6/mm3      Hemoglobin 11.9 g/dL      Hematocrit 36.0 %      MCV 90.7 fL      MCH 30.0 pg      MCHC 33.1 g/dL      RDW 12.4 %      RDW-SD 40.5 fl      MPV 9.6 fL      Platelets 258 10*3/mm3     Comprehensive Metabolic Panel [[id number]]  (Abnormal) Collected: 05/06/25 0537    Specimen: Blood Updated: 05/06/25 0703     Glucose 105 mg/dL      BUN 11 mg/dL      Creatinine 0.47 mg/dL      Sodium 139 mmol/L      Potassium 3.8 mmol/L      Comment: Slight hemolysis detected by analyzer. Result may be falsely elevated.        Chloride 102 mmol/L      CO2 26.8 mmol/L      Calcium 8.3 mg/dL      Total Protein 5.6 g/dL      Albumin 3.4 g/dL      ALT (SGPT) 12 U/L      AST (SGOT) 19 U/L      Alkaline Phosphatase 70 U/L      Total Bilirubin 0.6 mg/dL      Globulin 2.2 gm/dL      A/G Ratio 1.5 g/dL      BUN/Creatinine Ratio 23.4     Anion Gap 10.2 mmol/L      eGFR 93.4 mL/min/1.73     Narrative:      GFR Categories in Chronic Kidney Disease (CKD)              GFR Category          GFR (mL/min/1.73)    Interpretation  G1                    90 or greater        Normal or high (1)  G2                    60-89                Mild decrease (1)  G3a                   45-59                Mild to moderate decrease  G3b                   30-44                Moderate to severe decrease  G4                    15-29                Severe decrease  G5                    14 or less           Kidney failure    (1)In the absence of evidence of kidney disease, neither GFR category G1 or G2 fulfill the criteria for CKD.    eGFR calculation 2021 CKD-EPI creatinine equation, which  does not include race as a factor            Imaging Results (Last 24 Hours)       Procedure Component Value Units Date/Time    XR Chest 1 View [[id number]] Collected: 05/05/25 1741     Updated: 05/05/25 1746    Narrative:      XR CHEST 1 VW-     HISTORY: 85-year-old female with fever and nausea.     FINDINGS: There is pulmonary vascular congestion and there may be small  pleural effusions. Query CHF. No focal airspace consolidations are seen  to suggest pneumonia. Follow-up is recommended with 2 views of the  chest.     This report was finalized on 5/5/2025 5:43 PM by Dr. Susan Steiner M.D on  Workstation: [id number]                   No orders to display        Assessment/Plan     Active Hospital Problems    Diagnosis  POA    **Fever, unknown origin [R50.9]  Yes    Encephalopathy [G93.40]  Yes    Acute UTI (urinary tract infection) [N39.0]  Yes    Sepsis [A41.9]  Yes    Bacteremia due to Pseudomonas [R78.81, B96.5]  Yes    Hyperlipidemia [E78.5]  Yes    Age-related osteoporosis without current pathological fracture [M81.0]  Yes    Colitis, collagenous [K52.831]  Yes    Insomnia [G47.00]  Yes      Resolved Hospital Problems   No resolved problems to display.     Ms. Davila is an 85-year-old female with no reported chronic health conditions aside from osteoarthritis comes in with complaints of chills, sweats, nausea.  Later reported right flank pain.  White count initially was 12 and increased to 14 this morning.  No pyuria.  No pneumonia on the chest x-ray.  She was started on Rocephin for fever and leukocytosis.  Her blood cultures are now showing gram-negative rods positive Klebsiella oxytoca and Pseudomonas aeruginosa.  Infectious disease has evaluated her and ceftriaxone has been discontinued and replaced with cefepime.  CT of the chest and abdomen pelvis are pending. In isolation for R/O candida auris, per ID.  We appreciate expertise of infectious disease on the case.    Repeat labs in the morning.  Further  plans as clinical course progresses.      VTE Prophylaxis - SCDs.  Code Status - Full code.       JUNE Reyes  [facility]  05/06/25  13:57 EDT

## 2025-05-06 NOTE — PLAN OF CARE
Goal Outcome Evaluation:  Plan of Care Reviewed With: patient           Outcome Evaluation: Pt. is an 85 year old Female admitted to the hospital with c/o fever and nausea.  Pt. reports that prior to admission she was independent with functional mobility and used a Rwx for ambulation.  Pt. currently presents with decreased strength, decreased balance, and decreased tolerance to functional activity. This AM, pt. able to ambulate 30 feet, CGA x 1, with use of Rwx.  Pt. requires Min. assist x 1 for bed mobility and CGA x 1 for sit <-> stand transfers. BLE ther. ex. program x 10 reps completed for general strengthening.  Verbal/tactile cues given during ambulation for posture correction and Rwx guidance.  Pt. will benefit from skilled inpt. P.T. to address her functional deficits and to assist pt. in regaining her maximum level of independence with functional mobility.    Anticipated Discharge Disposition (PT): home with assist, home with home health

## 2025-05-06 NOTE — THERAPY EVALUATION
Patient Name: Samantha Davila  : 1940    MRN: [id number]                              Today's Date: 2025       Admit Date: 2025    Visit Dx:     ICD-10-CM ICD-9-CM   1. Fever, unspecified fever cause  R50.9 780.60   2. Acute cystitis without hematuria  N30.00 595.0     Patient Active Problem List   Diagnosis    Neurodermatitis    Insomnia    Viral upper respiratory tract infection    Closed fracture of neck of right femur    Fall    Weight loss    Colitis, collagenous    Age-related osteoporosis without current pathological fracture    Hyperlipidemia    Neck pain    Closed nondisplaced intertrochanteric fracture of left femur    Episodic confusion    Osteoarthritis of left knee    Fever, unknown origin    Encephalopathy    Acute UTI (urinary tract infection)    Sepsis     Past Medical History:   Diagnosis Date    Arthritis     Asthma     Colitis, collagenous     Colon polyp     Insomnia      Past Surgical History:   Procedure Laterality Date    BACK SURGERY      FEMUR IM NAILING/RODDING Left 11/3/2021    Procedure: FEMUR INTRAMEDULLARY NAILING/RODDING;  Surgeon: Michael Gillespie II, MD;  Location: [facility];  Service: Orthopedics;  Laterality: Left;  [place] Table    ROTATOR CUFF REPAIR Left     TONSILLECTOMY      TOTAL HIP ARTHROPLASTY Right 3/4/2019    Procedure: TOTAL HIP ARTHROPLASTY ANTERIOR WITH HANA TABLE;  Surgeon: Michael Gillespie II, MD;  Location: [facility];  Service: Orthopedics      General Information       Row Name 25 1133          OT Time and Intention    Document Type evaluation  -PP     Mode of Treatment individual therapy;occupational therapy  -PP       Row Name 25 1133          General Information    Patient Profile Reviewed yes  -PP     Prior Level of Function independent:;all household mobility;ADL's  Pt lives in [facility] with , (I) for ADLs and mob using rwx and SC for bathing.  -PP     Existing Precautions/Restrictions fall  -PP        Row Name 05/06/25 1133          Living Environment    Current Living Arrangements apartment  -PP     People in Home spouse  -PP       Row Name 05/06/25 1133          Cognition    Orientation Status (Cognition) oriented x 3  -PP       Row Name 05/06/25 1133          Safety Issues/Impairments Affecting Functional Mobility    Impairments Affecting Function (Mobility) balance;endurance/activity tolerance;strength  -PP     Comment, Safety Issues/Impairments (Mobility) gait belt and non skid socks worn for safety  -PP               User Key  (r) = Recorded By, (t) = Taken By, (c) = Cosigned By      Initials Name Provider Type    PP Chuck Mayorga OT Occupational Therapist                     Mobility/ADL's       Row Name 05/06/25 1136          Bed Mobility    Bed Mobility supine-sit;sit-supine  -PP     Supine-Sit [place] (Bed Mobility) not tested  -PP     Sit-Supine [place] (Bed Mobility) not tested  -PP     Assistive Device (Bed Mobility) bed rails  -PP     Comment, (Bed Mobility) UIC at session start and end  -PP       Row Name 05/06/25 1136          Transfers    Transfers stand-sit transfer;sit-stand transfer;toilet transfer  -PP       Row Name 05/06/25 1136          Sit-Stand Transfer    Sit-Stand [place] (Transfers) contact guard;minimum assist (75% patient effort)  -PP     Assistive Device (Sit-Stand Transfers) walker, front-wheeled  -PP     Comment, (Sit-Stand Transfer) Ronaldo initially and progressed to Cga for the remainder of the session with cueing to push up from surface  -PP       Row Name 05/06/25 1136          Toilet Transfer    Type (Toilet Transfer) sit-stand;stand-sit  -PP     [place] Level (Toilet Transfer) contact guard  -PP     Assistive Device (Toilet Transfer) commode, 3-in-1;walker, front-wheeled  -PP       Row Name 05/06/25 1136          Functional Mobility    Functional Mobility- Ind. Level contact guard assist;verbal cues required;nonverbal cues required (demo/gesture)   -PP     Functional Mobility- Device walker, front-wheeled  -PP     Functional Mobility- Comment Pt able to mobilize to/from BR>sink>chair with Cga/ rwx and cueing for rwx positioning and to guide rwx during tight transitions  -PP       Row Name 05/06/25 1136          Activities of Daily Living    BADL Assessment/Intervention lower body dressing;grooming;feeding;toileting  -PP       Row Name 05/06/25 1136          Self-Feeding Assessment/Training    [place] Level (Feeding) feeding skills;independent  -PP       Row Name 05/06/25 1136          Lower Body Dressing Assessment/Training    [place] Level (Lower Body Dressing) doff;don;socks;moderate assist (50% patient effort)  -PP     Position (Lower Body Dressing) supported sitting  -PP     Comment, (Lower Body Dressing) Pt reqs assist to thread socks over toes but able to pull socks up over heel and ankle.  -PP       Row Name 05/06/25 1136          Grooming Assessment/Training    [place] Level (Grooming) oral care regimen;wash face, hands;set up;standby assist  -PP     Position (Grooming) sink side;supported standing  -PP       Row Name 05/06/25 1136          Toileting Assessment/Training    [place] Level (Toileting) toileting skills;adjust/manage clothing;change pad/brief;perform perineal hygiene;contact guard assist  -PP     Assistive Devices (Toileting) commode, 3-in-1  -PP     Position (Toileting) supported standing;supported sitting  -PP     Comment, (Toileting) pull up brief used at baseline, per pt  -PP               User Key  (r) = Recorded By, (t) = Taken By, (c) = Cosigned By      Initials Name Provider Type    PP Chuck Mayorga OT Occupational Therapist                   Obj/Interventions       Row Name 05/06/25 1140          Sensory Assessment (Somatosensory)    Sensory Assessment (Somatosensory) UE sensation intact  -PP     Sensory Assessment per pt report  -PP       Row Name 05/06/25 1140          Vision Assessment/Intervention     Visual Impairment/Limitations WFL  -PP       Row Name 05/06/25 1140          Range of Motion Comprehensive    General Range of Motion bilateral upper extremity ROM WFL  -PP       [facility] Name 05/06/25 1140          Strength Comprehensive (MMT)    General Manual Muscle Testing (MMT) Assessment upper extremity strength deficits identified  -PP     Comment, General Manual Muscle Testing (MMT) Assessment Gen BUE weakness noted, grossly =/>3+/5  -PP       [facility] Name 05/06/25 1140          Motor Skills    Motor Skills functional endurance  -PP     Functional Endurance fair-  -PP       [facility] Name 05/06/25 1140          Balance    Balance Assessment sitting static balance;standing static balance;standing dynamic balance  -PP     Static Sitting Balance supervision  -PP     Position, Sitting Balance sitting in chair  -PP     Static Standing Balance contact guard  -PP     Dynamic Standing Balance contact guard  -PP     Position/Device Used, Standing Balance supported;walker, front-wheeled  -PP     Balance Interventions sitting;standing;supported;static;dynamic;occupation based/functional task  -PP               User Key  (r) = Recorded By, (t) = Taken By, (c) = Cosigned By      Initials Name Provider Type    PP Chuck Mayorga, OT Occupational Therapist                   Goals/Plan       Row Name 05/06/25 1155          Bed Mobility Goal 1 (OT)    Activity/Assistive Device (Bed Mobility Goal 1, OT) bed mobility activities, all  -PP     [place] Level/Cues Needed (Bed Mobility Goal 1, OT) modified independence  -PP     Time Frame (Bed Mobility Goal 1, OT) short term goal (STG);2 weeks  -PP       Row Name 05/06/25 1155          Transfer Goal 1 (OT)    Activity/Assistive Device (Transfer Goal 1, OT) transfers, all  -PP     [place] Level/Cues Needed (Transfer Goal 1, OT) modified independence  -PP     Time Frame (Transfer Goal 1, OT) short term goal (STG);2 weeks  -PP     Progress/Outcome (Transfer Goal 1, OT) new goal  -PP        Row Name 05/06/25 1155          Dressing Goal 1 (OT)    Activity/Device (Dressing Goal 1, OT) dressing skills, all;upper body dressing;lower body dressing  -PP     [place]/Cues Needed (Dressing Goal 1, OT) modified independence  -PP     Time Frame (Dressing Goal 1, OT) short term goal (STG);2 weeks  -PP     Progress/Outcome (Dressing Goal 1, OT) new goal  -PP       Row Name 05/06/25 1155          Toileting Goal 1 (OT)    Activity/Device (Toileting Goal 1, OT) toileting skills, all  -PP     [place] Level/Cues Needed (Toileting Goal 1, OT) modified independence  -PP     Time Frame (Toileting Goal 1, OT) short term goal (STG);2 weeks  -PP     Progress/Outcome (Toileting Goal 1, OT) new goal  -PP       Row Name 05/06/25 1155          Grooming Goal 1 (OT)    Activity/Device (Grooming Goal 1, OT) grooming skills, all  -PP     [place] (Grooming Goal 1, OT) modified independence  -PP     Time Frame (Grooming Goal 1, OT) short term goal (STG);2 weeks  -PP     Progress/Outcome (Grooming Goal 1, OT) new goal  -PP       Row Name 05/06/25 1152          Therapy Assessment/Plan (OT)    Planned Therapy Interventions (OT) activity tolerance training;BADL retraining;functional balance retraining;occupation/activity based interventions;patient/caregiver education/training;strengthening exercise;transfer/mobility retraining  -PP               User Key  (r) = Recorded By, (t) = Taken By, (c) = Cosigned By      Initials Name Provider Type    PP Chuck Mayorga, OT Occupational Therapist                   Clinical Impression       Row Name 05/06/25 1142          Pain Assessment    Pretreatment Pain Rating 5/10  -PP     Posttreatment Pain Rating 5/10  -PP     Pain Location head  -PP     Pain Management Interventions nursing notified  -PP     Response to Pain Interventions activity participation with tolerable pain  -PP     Pre/Posttreatment Pain Comment Pt c/o 5/10 headache pain but RN aware- tylenol given just prior  to session start  -PP       Row Name 05/06/25 1142          Plan of Care Review    Plan of Care Reviewed With patient  -PP     Progress improving  -PP     Outcome Evaluation Patient is a 85 y.o. female admitted with c/o acute fever, chills, and nausea. Sepsis (+). Pmhx includes arthritis, asthma. At baseline, [facility] , being (I) for ADLs and mob usng rwx ad SC for bathing. She endorses feeling better today but 5/10 headache- RN recently gave tylenol. Today, pt presents to OT Eval with slight decline in baseline function demo'ing decreased endurance, balance and weakness. She reqs Ronaldo initially, progressing to Cga with rwx for STS t/o session. Mod A for donning/doffing socks. Cga/ rwx to mobilize to BR and complete toileting using 3-in-1 over commode to elevate commode height and provide stability. Sba for G&H standing supported at sink. IPOT will continue to monitor to maximize patient safety and (I) w/ ADLs/ xfers in order for pt to safely dc home w/ HH and improved function to baseline performace.  -PP       Row Name 05/06/25 1142          Therapy Assessment/Plan (OT)    Rehab Potential (OT) good  -PP     Criteria for Skilled Therapeutic Interventions Met (OT) yes;skilled treatment is necessary  -PP     Therapy Frequency (OT) 3 times/wk  -PP       Row Name 05/06/25 1142          Therapy Plan Review/Discharge Plan (OT)    Anticipated Discharge Disposition (OT) home with assist;home with home health;home  -PP       Row Name 05/06/25 1142          Vital Signs    Pre SpO2 (%) 96  -PP     O2 Delivery Pre Treatment room air  -PP     Pre Patient Position Sitting  -PP     Intra Patient Position Standing  -PP     Post Patient Position Sitting  -PP       Row Name 05/06/25 1142          Positioning and Restraints    Pre-Treatment Position sitting in chair/recliner  -PP     Post Treatment Position chair  -PP     In Chair notified nsg;reclined;call light within reach;encouraged to call  for assist;exit alarm on  -PP               User Key  (r) = Recorded By, (t) = Taken By, (c) = Cosigned By      Initials Name Provider Type    PP Chuck Mayorga OT Occupational Therapist                   Outcome Measures       Row Name 05/06/25 1146          How much help from another person do you currently need...    Turning from your back to your side while in flat bed without using bedrails? 3  -MS     Moving from lying on back to sitting on the side of a flat bed without bedrails? 3  -MS     Moving to and from a bed to a chair (including a wheelchair)? 3  -MS     Standing up from a chair using your arms (e.g., wheelchair, bedside chair)? 3  -MS     Climbing 3-5 steps with a railing? 3  -MS     To walk in hospital room? 3  -MS     AM-PAC 6 Clicks Score (PT) 18  -MS     Highest Level of Mobility Goal 6 --> Walk 10 steps or more  -MS       Row Name 05/06/25 1146          Functional Assessment    Outcome Measure Options AM-PAC 6 Clicks Basic Mobility (PT)  -MS               User Key  (r) = Recorded By, (t) = Taken By, (c) = Cosigned By      Initials Name Provider Type    Addison Sumner, PT Physical Therapist                    Occupational Therapy Education       Title: PT OT SLP Therapies (In Progress)       Topic: Occupational Therapy (In Progress)       Point: ADL training (Done)       Learning Progress Summary            Patient Acceptance, E, VU by PP at 5/6/2025 1372    Comment: Pt Ed on OT role, dc planning, use of 3-in-1 to increase safety and (I) with toileting and call light function.                                      User Key       Initials Effective Dates Name Provider Type Discipline    PP 06/09/23 -  Chuck Mayorga OT Occupational Therapist OT                  OT Recommendation and Plan  Planned Therapy Interventions (OT): activity tolerance training, BADL retraining, functional balance retraining, occupation/activity based interventions, patient/caregiver education/training,  strengthening exercise, transfer/mobility retraining  Therapy Frequency (OT): 3 times/wk  Plan of Care Review  Plan of Care Reviewed With: patient  Progress: improving  Outcome Evaluation: Patient is a 85 y.o. female admitted with c/o acute fever, chills, and nausea. Sepsis (+). Pmhx includes arthritis, asthma. At baseline, pt reports living in [facility] [place] with , being (I) for ADLs and mob usng rwx ad SC for bathing. She endorses feeling better today but 5/10 headache- RN recently gave tylenol. Today, pt presents to OT Eval with slight decline in baseline function demo'ing decreased endurance, balance and weakness. She reqs Ronaldo initially, progressing to Cga with rwx for STS t/o session. Mod A for donning/doffing socks. Cga/ rwx to mobilize to BR and complete toileting using 3-in-1 over commode to elevate commode height and provide stability. Sba for G&H standing supported at sink. IPOT will continue to monitor to maximize patient safety and (I) w/ ADLs/ xfers in order for pt to safely dc home w/ HH and improved function to baseline performace.     Time Calculation:   Evaluation Complexity (OT)  Review Occupational Profile/Medical/Therapy History Complexity: expanded/moderate complexity  Assessment, Occupational Performance/Identification of Deficit Complexity: 3-5 performance deficits  Clinical Decision Making Complexity (OT): detailed assessment/moderate complexity  Overall Complexity of Evaluation (OT): moderate complexity     Time Calculation- OT       Row Name 05/06/25 1157             Time Calculation- OT    OT Start Time 1005  -PP      OT Stop Time 1051  -PP      OT Time Calculation (min) 46 min  -PP      Total Timed Code Minutes- OT 36 minute(s)  -PP      OT Received On 05/06/25  -PP      OT - Next Appointment 05/08/25  -PP      OT Goal Re-Cert Due Date 05/20/25  -PP         Timed Charges    22534 - OT Self Care/Mgmt Minutes 36  -PP         Untimed Charges    OT Eval/Re-eval Minutes 10  -PP          Total Minutes    Timed Charges Total Minutes 36  -PP      Untimed Charges Total Minutes 10  -PP       Total Minutes 46  -PP                User Key  (r) = Recorded By, (t) = Taken By, (c) = Cosigned By      Initials Name Provider Type    PP Chuck Mayorga, OT Occupational Therapist                  Therapy Charges for Today       Code Description Service Date Service Provider Modifiers Qty    [id number]  OT SELF CARE/MGMT/TRAIN EA 15 MIN 5/6/2025 Chuck Mayorga OT GO 2    [id number]  OT EVAL MOD COMPLEXITY 3 5/6/2025 Chuck Mayorga, OT GO 1                 Chuck Mayorga OT  5/6/2025

## 2025-05-06 NOTE — PLAN OF CARE
Problem: Adult Inpatient Plan of Care  Goal: Absence of Hospital-Acquired Illness or Injury  Intervention: Identify and Manage Fall Risk  Recent Flowsheet Documentation  Taken 5/6/2025 1555 by Nessa Roca RN  Safety Promotion/Fall Prevention: safety round/check completed  Taken 5/6/2025 1400 by Nessa Roca RN  Safety Promotion/Fall Prevention: safety round/check completed  Taken 5/6/2025 1200 by Nessa Roca RN  Safety Promotion/Fall Prevention: safety round/check completed  Taken 5/6/2025 1000 by Nessa Roca RN  Safety Promotion/Fall Prevention: safety round/check completed  Taken 5/6/2025 0800 by Nessa Roca RN  Safety Promotion/Fall Prevention: safety round/check completed  Intervention: Prevent Skin Injury  Recent Flowsheet Documentation  Taken 5/6/2025 1555 by Nessa Roca RN  Body Position: position changed independently  Skin Protection: transparent dressing maintained  Taken 5/6/2025 0800 by Nessa Roca RN  Skin Protection: transparent dressing maintained  Intervention: Prevent and Manage VTE (Venous Thromboembolism) Risk  Recent Flowsheet Documentation  Taken 5/6/2025 1555 by Nessa Roca RN  VTE Prevention/Management:   bilateral   SCDs (sequential compression devices) off   patient refused intervention  Taken 5/6/2025 0800 by Nessa Roca RN  VTE Prevention/Management:   bilateral   SCDs (sequential compression devices) off   patient refused intervention  Goal: Optimal Comfort and Wellbeing  Intervention: Monitor Pain and Promote Comfort  Recent Flowsheet Documentation  Taken 5/6/2025 0934 by Nessa Roca RN  Pain Management Interventions: pain medication given  Intervention: Provide Person-Centered Care  Recent Flowsheet Documentation  Taken 5/6/2025 1555 by Nessa Roca RN  Trust Relationship/Rapport: care explained     Problem: Violence Risk or Actual  Goal: Anger and Impulse Control  Intervention: Minimize Safety Risk  Recent Flowsheet Documentation  Taken  5/6/2025 1555 by Nessa Roca RN  Enhanced Safety Measures: chair alarm set  Taken 5/6/2025 1400 by Nessa Roca RN  Enhanced Safety Measures: chair alarm set  Taken 5/6/2025 1200 by Nessa Roca RN  Enhanced Safety Measures: chair alarm set  Taken 5/6/2025 1000 by Nessa Roca RN  Enhanced Safety Measures: chair alarm set     Problem: Fall Injury Risk  Goal: Absence of Fall and Fall-Related Injury  Intervention: Promote Injury-Free Environment  Recent Flowsheet Documentation  Taken 5/6/2025 1555 by Nessa Roca RN  Safety Promotion/Fall Prevention: safety round/check completed  Taken 5/6/2025 1400 by Nessa Roca RN  Safety Promotion/Fall Prevention: safety round/check completed  Taken 5/6/2025 1200 by Nessa Roca RN  Safety Promotion/Fall Prevention: safety round/check completed  Taken 5/6/2025 1000 by Nessa Roca RN  Safety Promotion/Fall Prevention: safety round/check completed  Taken 5/6/2025 0800 by Nessa Roca RN  Safety Promotion/Fall Prevention: safety round/check completed     Problem: Skin Injury Risk Increased  Goal: Skin Health and Integrity  Intervention: Optimize Skin Protection  Recent Flowsheet Documentation  Taken 5/6/2025 1555 by Nsesa Roca RN  Activity Management: up in chair  Pressure Reduction Techniques: frequent weight shift encouraged  Head of Bed (HOB) Positioning: HOB elevated  Pressure Reduction Devices: positioning supports utilized  Skin Protection: transparent dressing maintained  Taken 5/6/2025 1400 by Nessa Roca RN  Activity Management: up in chair  Taken 5/6/2025 1200 by Nessa Roca RN  Activity Management: up in chair  Taken 5/6/2025 1000 by Nessa Roca RN  Activity Management: up in chair  Taken 5/6/2025 0800 by Nessa Roca RN  Pressure Reduction Techniques: frequent weight shift encouraged  Pressure Reduction Devices: positioning supports utilized  Skin Protection: transparent dressing maintained     Problem: Sepsis/Septic  Shock  Goal: Absence of Infection Signs and Symptoms  Intervention: Promote Recovery  Recent Flowsheet Documentation  Taken 5/6/2025 1555 by Nessa Roca, RN  Activity Management: up in chair  Taken 5/6/2025 1400 by Nessa Roca, RN  Activity Management: up in chair  Taken 5/6/2025 1200 by Nessa Roca, RN  Activity Management: up in chair  Taken 5/6/2025 1000 by Nessa Roca RN  Activity Management: up in chair   Goal Outcome Evaluation:   VSS on room air; SR on tele; tylenol admin x2 for HA and chills; pt remains afebrile; 250cc bolus admin this shift; IVFs DCd per order; awaiting CT of chest and abd; ID consulted for bacteremia; orthostatic BPs negative; cont IV abx; chair alarm set; call light within reach; makes needs known.

## 2025-05-06 NOTE — THERAPY EVALUATION
Patient Name: Samantha Davila  : 1940    MRN: [id number]                              Today's Date: 2025       Admit Date: 2025    Visit Dx:     ICD-10-CM ICD-9-CM   1. Fever, unspecified fever cause  R50.9 780.60   2. Acute cystitis without hematuria  N30.00 595.0     Patient Active Problem List   Diagnosis    Neurodermatitis    Insomnia    Viral upper respiratory tract infection    Closed fracture of neck of right femur    Fall    Weight loss    Colitis, collagenous    Age-related osteoporosis without current pathological fracture    Hyperlipidemia    Neck pain    Closed nondisplaced intertrochanteric fracture of left femur    Episodic confusion    Osteoarthritis of left knee    Fever, unknown origin    Encephalopathy    Acute UTI (urinary tract infection)    Sepsis     Past Medical History:   Diagnosis Date    Arthritis     Asthma     Colitis, collagenous     Colon polyp     Insomnia      Past Surgical History:   Procedure Laterality Date    BACK SURGERY      FEMUR IM NAILING/RODDING Left 11/3/2021    Procedure: FEMUR INTRAMEDULLARY NAILING/RODDING;  Surgeon: Michael Gillespie II, MD;  Location: [facility];  Service: Orthopedics;  Laterality: Left;  [place] Table    ROTATOR CUFF REPAIR Left     TONSILLECTOMY      TOTAL HIP ARTHROPLASTY Right 3/4/2019    Procedure: TOTAL HIP ARTHROPLASTY ANTERIOR WITH HANA TABLE;  Surgeon: Michael Gillespie II, MD;  Location: [facility] OR;  Service: Orthopedics      General Information       Row Name 25 1141          Physical Therapy Time and Intention    Document Type evaluation  Pt. admitted with c/o a fever and nausea  -MS     Mode of Treatment physical therapy;individual therapy  -MS       Row Name 25 1141          General Information    Patient Profile Reviewed yes  -MS     Prior Level of Function independent:  Use of Rwx for ambulation  -MS     Existing Precautions/Restrictions fall   Exit alarm  -MS     Barriers to Rehab none  identified  -MS       Row Name 05/06/25 1141          Cognition    Orientation Status (Cognition) oriented x 3  -MS       Row Name 05/06/25 1141          Safety Issues/Impairments Affecting Functional Mobility    Comment, Safety Issues/Impairments (Mobility) Gait belt used for safety.  -MS               User Key  (r) = Recorded By, (t) = Taken By, (c) = Cosigned By      Initials Name Provider Type    MS Wagner Addison BECKMAN, PT Physical Therapist                   Mobility       Row Name 05/06/25 1142          Bed Mobility    Supine-Sit [place] (Bed Mobility) minimum assist (75% patient effort)  -MS       Row Name 05/06/25 1142          Sit-Stand Transfer    Sit-Stand [place] (Transfers) contact guard  -MS     Assistive Device (Sit-Stand Transfers) walker, front-wheeled  -MS       Row Name 05/06/25 1142          Gait/Stairs (Locomotion)    [place] Level (Gait) contact guard  -MS     Assistive Device (Gait) walker, front-wheeled  -MS     Distance in Feet (Gait) 30  -MS     Deviations/Abnormal Patterns (Gait) khoa decreased;stride length decreased  -MS     Bilateral Gait Deviations forward flexed posture  -MS     Comment, (Gait/Stairs) [place]/tactile cues given for posture correction and Rwx guidance.  -MS               User Key  (r) = Recorded By, (t) = Taken By, (c) = Cosigned By      Initials Name Provider Type    MS WagnerAddison, PT Physical Therapist                   Obj/Interventions       Row Name 05/06/25 1144          Range of Motion Comprehensive    Comment, General Range of Motion BUE/LE (WFL's)  -MS       Row Name 05/06/25 1144          Strength Comprehensive (MMT)    Comment, General Manual Muscle Testing (MMT) Assessment BUE/LE (3+/5)  -MS       Row Name 05/06/25 1144          Motor Skills    Therapeutic Exercise --  BLE ther. ex. program x 10 reps completed (Hip Flexion, LAQ's)  -MS               User Key  (r) = Recorded By, (t) = Taken By, (c) = Cosigned By      Initials Name  Provider Type    Addison Sumner RK, PT Physical Therapist                   Goals/Plan       Row Name 05/06/25 1145          Bed Mobility Goal 1 (PT)    Activity/Assistive Device (Bed Mobility Goal 1, PT) bed mobility activities, all  -MS     [place] Level/Cues Needed (Bed Mobility Goal 1, PT) independent  -MS     Time Frame (Bed Mobility Goal 1, PT) long term goal (LTG);1 week  -MS       Row Name 05/06/25 1145          Transfer Goal 1 (PT)    Activity/Assistive Device (Transfer Goal 1, PT) transfers, all;walker, rolling  -MS     [place] Level/Cues Needed (Transfer Goal 1, PT) independent  -MS     Time Frame (Transfer Goal 1, PT) long term goal (LTG);1 week  -MS       Row Name 05/06/25 1145          Gait Training Goal 1 (PT)    Activity/Assistive Device (Gait Training Goal 1, PT) gait (walking locomotion);walker, rolling  -MS     [place] Level (Gait Training Goal 1, PT) independent  -MS     Distance (Gait Training Goal 1, PT) 100 feet  -MS     Time Frame (Gait Training Goal 1, PT) long term goal (LTG);1 week  -MS       Row Name 05/06/25 1141          Therapy Assessment/Plan (PT)    Planned Therapy Interventions (PT) balance training;bed mobility training;gait training;home exercise program;patient/family education;postural re-education;transfer training;strengthening  -MS               User Key  (r) = Recorded By, (t) = Taken By, (c) = Cosigned By      Initials Name Provider Type    Addison Sumner RK, PT Physical Therapist                   Clinical Impression       Row Name 05/06/25 1144          Pain    Pretreatment Pain Rating 3/10  -MS     Posttreatment Pain Rating 3/10  -MS     Pain Location head  -MS     Pre/Posttreatment Pain Comment Pt. reports headache pain  -MS       Row Name 05/06/25 1149          Plan of Care Review    Plan of Care Reviewed With patient  -MS       Row Name 05/06/25 1148          Therapy Assessment/Plan (PT)    Rehab Potential (PT) good  -MS     Criteria for Skilled  Interventions Met (PT) skilled treatment is necessary  -MS     Therapy Frequency (PT) 6 times/wk  -MS       Row Name 05/06/25 1144          Positioning and Restraints    Pre-Treatment Position in bed  -MS     Post Treatment Position chair  -MS     In Chair notified nsg;reclined;sitting;call light within reach;encouraged to call for assist;exit alarm on  All lines intact.  -MS               User Key  (r) = Recorded By, (t) = Taken By, (c) = Cosigned By      Initials Name Provider Type    Addison Sumner, PT Physical Therapist                   Outcome Measures       Row Name 05/06/25 1146          How much help from another person do you currently need...    Turning from your back to your side while in flat bed without using bedrails? 3  -MS     Moving from lying on back to sitting on the side of a flat bed without bedrails? 3  -MS     Moving to and from a bed to a chair (including a wheelchair)? 3  -MS     Standing up from a chair using your arms (e.g., wheelchair, bedside chair)? 3  -MS     Climbing 3-5 steps with a railing? 3  -MS     To walk in hospital room? 3  -MS     AM-PAC 6 Clicks Score (PT) 18  -MS     Highest Level of Mobility Goal 6 --> Walk 10 steps or more  -MS       Row Name 05/06/25 1146          Functional Assessment    Outcome Measure Options AM-PAC 6 Clicks Basic Mobility (PT)  -MS               User Key  (r) = Recorded By, (t) = Taken By, (c) = Cosigned By      Initials Name Provider Type    Addison Sumner, PT Physical Therapist                                 Physical Therapy Education       Title: PT OT SLP Therapies (Done)       Topic: Physical Therapy (Done)       Point: Mobility training (Done)       Learning Progress Summary            Patient Acceptance, E,D, VU,NR by MS at 5/6/2025 1146                      Point: Home exercise program (Done)       Learning Progress Summary            Patient Acceptance, E,D, VU,NR by MS at 5/6/2025 1146                      Point: Body  mechanics (Done)       Learning Progress Summary            Patient Acceptance, E,D, VU,NR by MS at 5/6/2025 1146                      Point: Precautions (Done)       Learning Progress Summary            Patient Acceptance, E,D, VU,NR by MS at 5/6/2025 1146                                      User Key       Initials Effective Dates Name Provider Type Discipline    MS 06/16/21 -  Addison Wagner, PT Physical Therapist PT                  PT Recommendation and Plan  Planned Therapy Interventions (PT): balance training, bed mobility training, gait training, home exercise program, patient/family education, postural re-education, transfer training, strengthening  Outcome Evaluation: Pt. is an 85 year old Female admitted to the hospital with c/o fever and nausea.  Pt. reports that prior to admission she was independent with functional mobility and used a Rwx for ambulation.  Pt. currently presents with decreased strength, decreased balance, and decreased tolerance to functional activity. This AM, pt. able to ambulate 30 feet, CGA x 1, with use of Rwx.  Pt. requires Min. assist x 1 for bed mobility and CGA x 1 for sit <-> stand transfers. BLE ther. ex. program x 10 reps completed for general strengthening.  Verbal/tactile cues given during ambulation for posture correction and Rwx guidance.  Pt. will benefit from skilled inpt. P.T. to address her functional deficits and to assist pt. in regaining her maximum level of independence with functional mobility.     Time Calculation:         PT Charges       Row Name 05/06/25 1150             Time Calculation    Start Time 0800  -MS      Stop Time 0825  -MS      Time Calculation (min) 25 min  -MS      PT Received On 05/06/25  -MS      PT - Next Appointment 05/07/25  -MS      PT Goal Re-Cert Due Date 05/13/25  -MS         Time Calculation- PT    Total Timed Code Minutes- PT 21 minute(s)  -MS                User Key  (r) = Recorded By, (t) = Taken By, (c) = Cosigned By       Initials Name Provider Type    Addison Sumner, PT Physical Therapist                  Therapy Charges for Today       Code Description Service Date Service Provider Modifiers Qty    28727503116 HC PT EVAL MOD COMPLEXITY 3 5/6/2025 Addison Wagner, PT GP 1    52422838963 HC PT THERAPEUTIC ACT EA 15 MIN 5/6/2025 Addison Wagner, PT GP 1            PT G-Codes  Outcome Measure Options: AM-PAC 6 Clicks Basic Mobility (PT)  AM-PAC 6 Clicks Score (PT): 18  PT Discharge Summary  Anticipated Discharge Disposition (PT): home with assist, home with home health    Addison Wagner, PT  5/6/2025

## 2025-05-07 ENCOUNTER — APPOINTMENT (OUTPATIENT)
Dept: CT IMAGING | Facility: HOSPITAL | Age: 85
DRG: 871 | End: 2025-05-07
Payer: MEDICARE

## 2025-05-07 LAB
ANION GAP SERPL CALCULATED.3IONS-SCNC: 8 MMOL/L (ref 5–15)
BASOPHILS # BLD AUTO: 0.03 10*3/MM3 (ref 0–0.2)
BASOPHILS NFR BLD AUTO: 0.4 % (ref 0–1.5)
BUN SERPL-MCNC: 7 MG/DL (ref 8–23)
BUN/CREAT SERPL: 13.2 (ref 7–25)
C AURIS DNA SPEC QL NAA+NON-PROBE: NOT DETECTED
CALCIUM SPEC-SCNC: 8.3 MG/DL (ref 8.6–10.5)
CHLORIDE SERPL-SCNC: 104 MMOL/L (ref 98–107)
CO2 SERPL-SCNC: 27 MMOL/L (ref 22–29)
CREAT SERPL-MCNC: 0.53 MG/DL (ref 0.57–1)
DEPRECATED RDW RBC AUTO: 41.8 FL (ref 37–54)
EGFRCR SERPLBLD CKD-EPI 2021: 90.8 ML/MIN/1.73
EOSINOPHIL # BLD AUTO: 0.11 10*3/MM3 (ref 0–0.4)
EOSINOPHIL NFR BLD AUTO: 1.6 % (ref 0.3–6.2)
ERYTHROCYTE [DISTWIDTH] IN BLOOD BY AUTOMATED COUNT: 12.8 % (ref 12.3–15.4)
GLUCOSE SERPL-MCNC: 97 MG/DL (ref 65–99)
HCT VFR BLD AUTO: 35.1 % (ref 34–46.6)
HGB BLD-MCNC: 12 G/DL (ref 12–15.9)
IMM GRANULOCYTES # BLD AUTO: 0.02 10*3/MM3 (ref 0–0.05)
IMM GRANULOCYTES NFR BLD AUTO: 0.3 % (ref 0–0.5)
LYMPHOCYTES # BLD AUTO: 1.26 10*3/MM3 (ref 0.7–3.1)
LYMPHOCYTES NFR BLD AUTO: 18.6 % (ref 19.6–45.3)
MCH RBC QN AUTO: 30.9 PG (ref 26.6–33)
MCHC RBC AUTO-ENTMCNC: 34.2 G/DL (ref 31.5–35.7)
MCV RBC AUTO: 90.5 FL (ref 79–97)
MONOCYTES # BLD AUTO: 0.61 10*3/MM3 (ref 0.1–0.9)
MONOCYTES NFR BLD AUTO: 9 % (ref 5–12)
NEUTROPHILS NFR BLD AUTO: 4.74 10*3/MM3 (ref 1.7–7)
NEUTROPHILS NFR BLD AUTO: 70.1 % (ref 42.7–76)
NRBC BLD AUTO-RTO: 0 /100 WBC (ref 0–0.2)
PLATELET # BLD AUTO: 227 10*3/MM3 (ref 140–450)
PMV BLD AUTO: 9.5 FL (ref 6–12)
POTASSIUM SERPL-SCNC: 3.6 MMOL/L (ref 3.5–5.2)
RBC # BLD AUTO: 3.88 10*6/MM3 (ref 3.77–5.28)
SODIUM SERPL-SCNC: 139 MMOL/L (ref 136–145)
WBC NRBC COR # BLD AUTO: 6.77 10*3/MM3 (ref 3.4–10.8)

## 2025-05-07 PROCEDURE — 97530 THERAPEUTIC ACTIVITIES: CPT

## 2025-05-07 PROCEDURE — 71260 CT THORAX DX C+: CPT

## 2025-05-07 PROCEDURE — 74177 CT ABD & PELVIS W/CONTRAST: CPT

## 2025-05-07 PROCEDURE — 25010000002 CEFEPIME PER 500 MG: Performed by: INTERNAL MEDICINE

## 2025-05-07 PROCEDURE — 85025 COMPLETE CBC W/AUTO DIFF WBC: CPT | Performed by: NURSE PRACTITIONER

## 2025-05-07 PROCEDURE — 80048 BASIC METABOLIC PNL TOTAL CA: CPT | Performed by: NURSE PRACTITIONER

## 2025-05-07 PROCEDURE — 25510000001 IOPAMIDOL 61 % SOLUTION: Performed by: INTERNAL MEDICINE

## 2025-05-07 RX ORDER — IOPAMIDOL 612 MG/ML
100 INJECTION, SOLUTION INTRAVASCULAR
Status: COMPLETED | OUTPATIENT
Start: 2025-05-07 | End: 2025-05-07

## 2025-05-07 RX ADMIN — BUDESONIDE 9 MG: 3 CAPSULE ORAL at 10:18

## 2025-05-07 RX ADMIN — Medication 1 TABLET: at 08:56

## 2025-05-07 RX ADMIN — CETIRIZINE HYDROCHLORIDE 5 MG: 10 TABLET, FILM COATED ORAL at 08:56

## 2025-05-07 RX ADMIN — Medication 2000 UNITS: at 08:56

## 2025-05-07 RX ADMIN — CEFEPIME 2000 MG: 2 INJECTION, POWDER, FOR SOLUTION INTRAVENOUS at 04:15

## 2025-05-07 RX ADMIN — CEFEPIME 2000 MG: 2 INJECTION, POWDER, FOR SOLUTION INTRAVENOUS at 22:09

## 2025-05-07 RX ADMIN — Medication 2.5 MG: at 22:09

## 2025-05-07 RX ADMIN — IOPAMIDOL 85 ML: 612 INJECTION, SOLUTION INTRAVENOUS at 02:04

## 2025-05-07 RX ADMIN — ACETAMINOPHEN 325MG 650 MG: 325 TABLET ORAL at 22:10

## 2025-05-07 RX ADMIN — CEFEPIME 2000 MG: 2 INJECTION, POWDER, FOR SOLUTION INTRAVENOUS at 13:03

## 2025-05-07 RX ADMIN — Medication 10 ML: at 09:01

## 2025-05-07 RX ADMIN — Medication 10 ML: at 22:11

## 2025-05-07 RX ADMIN — ACETAMINOPHEN 325MG 650 MG: 325 TABLET ORAL at 00:11

## 2025-05-07 NOTE — PLAN OF CARE
Goal Outcome Evaluation:  Plan of Care Reviewed With: patient        Progress: improving  Outcome Evaluation: Pt seen for PT this PM with improved activity tolerance. Ambulates up to 100' with rwx and CGA. Pt with tendency to keep walker too far out in front - VC for technique which improved posture as well. Pt uses bathroom during session. VC for safety awareness of lines throughout. Intermittent redirect to task as pt is easily distracted. Plans return to [facility], recommend home therapy at d/c.    Anticipated Discharge Disposition (PT): home with assist, home with home health, assisted living

## 2025-05-07 NOTE — PLAN OF CARE
Goal Outcome Evaluation:                 WBC's were 14.98 now 6.77. No fever throughout night. Pt voiced anger and frustration about not being prescribed Ambien. Melatonin and Tylenol given, but patient had difficulty sleeping. Pt ambulated numerous times to bathroom throughout this shift. CT of abdomen, chest, and pelvis was completed and patient returned to floor at 0225.

## 2025-05-07 NOTE — PROGRESS NOTES
ID note for sepsis  Subjective: Says she had a rough night which was mainly marked by having difficulty getting out of bed and getting to the bathroom.  Afebrile.  Tolerating cefepime  Physical Exam:   Vital Signs   Temp:  [97.9 °F (36.6 °C)-98.6 °F (37 °C)] 97.9 °F (36.6 °C)  Heart Rate:  [67-83] 67  Resp:  [18] 18  BP: (101-137)/(58-80) 124/67    GENERAL: Awake and alert, in no acute distress.   HEENT: Oropharynx is clear. Hearing is grossly normal.   EYES: . No conjunctival injection. No lid lag.   LUNGS:normal respiratory effort.   SKIN: no cutaneous eruptions in exposed areas  PSYCHIATRIC: Appropriate mood, affect, insight, and judgment.     Results Review:  White count 6.7  Creatinine 0.53     Blood culture 2/2 gram-negative rods, Pseudomonas and Klebsiella  Legionella and strep urinary antigen and respiratory pathogen panel negative     CT chest independently interpreted: No acute pneumonia     A/p  1.  Gram-negative septicemia  2.  Infection control: Candida auris negative.  Can DC isolation    BCx now with K oxytoca and Pseduomonas.  Continue cefepime  CT chest abdomen pelvis without clear nidus of infection.  Likely had GI or biliary source.  Could consider outpatient colonoscopy to assess for any colonic lesion as portal of entry for enteric bacteria if in keeping with goals of care  Hope to transition to oral antibiotics at DE pending sensitivities

## 2025-05-07 NOTE — PROGRESS NOTES
Name: Samantha Davila ADMIT: 2025   : 1940  PCP: Michelle Lyons MD    MRN: [id number] LOS: 1 days   AGE/SEX: 85 y.o. female  ROOM: [facility]     Subjective   Subjective   No event. Starting to feel better       Objective   Objective   Vital Signs  Temp:  [97.9 °F (36.6 °C)-98.6 °F (37 °C)] 98.4 °F (36.9 °C)  Heart Rate:  [67-81] 69  Resp:  [18] 18  BP: (110-137)/(61-80) 110/66  SpO2:  [90 %-99 %] 97 %  on   ;   Device (Oxygen Therapy): room air  Body mass index is 23.87 kg/m².  Physical Exam  Vitals reviewed.   Constitutional:       General: She is not in acute distress.     Appearance: She is not ill-appearing.   Cardiovascular:      Rate and Rhythm: Normal rate. Rhythm irregular.   Pulmonary:      Effort: No respiratory distress.      Breath sounds: Normal breath sounds.   Musculoskeletal:      Right lower leg: No edema.      Left lower leg: No edema.   Skin:     General: Skin is warm and dry.   Neurological:      Mental Status: She is alert.       Results Review     I reviewed the patient's new clinical results.  Results from last 7 days   Lab Units 25  0531 25  0537 25  1639   WBC 10*3/mm3 6.77 14.98* 12.49*   HEMOGLOBIN g/dL 12.0 11.9* 12.4   PLATELETS 10*3/mm3 227 258 248     Results from last 7 days   Lab Units 25  0530 25  0537 25  1639   SODIUM mmol/L 139 139 136   POTASSIUM mmol/L 3.6 3.8 3.6   CHLORIDE mmol/L 104 102 100   CO2 mmol/L 27.0 26.8 23.0   BUN mg/dL 7* 11 12   CREATININE mg/dL 0.53* 0.47* 0.54*   GLUCOSE mg/dL 97 105* 113*   EGFR mL/min/1.73 90.8 93.4 90.4     Results from last 7 days   Lab Units 25  0537 25  1639   ALBUMIN g/dL 3.4* 3.8   BILIRUBIN mg/dL 0.6 0.4   ALK PHOS U/L 70 78   AST (SGOT) U/L 19 19   ALT (SGPT) U/L 12 14     Results from last 7 days   Lab Units 25  0530 25  0537 25  1639   CALCIUM mg/dL 8.3* 8.3* 8.3*   ALBUMIN g/dL  --  3.4* 3.8     Results from last 7 days   Lab Units 25  163  "  LACTATE mmol/L 1.6     No results found for: \"HGBA1C\", \"POCGLU\"    CT Abdomen Pelvis With Contrast  Result Date: 5/7/2025  Electronically signed by Jani Cantu MD on 05-07-25 at 0339    CT Chest With Contrast Diagnostic  Result Date: 5/7/2025  Electronically signed by Jani Cantu MD on 05-07-25 at 0320      I have personally reviewed all medications:  Scheduled Medications  Budesonide, 9 mg, Oral, QAM  cefepime, 2,000 mg, Intravenous, Q8H  cetirizine, 5 mg, Oral, Daily  cholecalciferol, 2,000 Units, Oral, Daily  melatonin, 2.5 mg, Oral, Nightly  multivitamin with minerals, 1 tablet, Oral, Daily  sodium chloride, 10 mL, Intravenous, Q12H    Infusions   Diet  Diet: Cardiac; Healthy Heart (2-3 Na+); Fluid Consistency: Thin (IDDSI 0)    I have personally reviewed:  [x]  Laboratory   [x]  Microbiology   [x]  Radiology   [x]  EKG/Telemetry  [x]  Cardiology/Vascular   []  Pathology    []  Records       Assessment/Plan     Active Hospital Problems    Diagnosis  POA    **Fever, unknown origin [R50.9]  Yes    Sepsis [A41.9]  Yes    Bacteremia due to Pseudomonas [R78.81, B96.5]  Yes    Metabolic encephalopathy [G93.41]  Yes    Hyperlipidemia [E78.5]  Yes    Age-related osteoporosis without current pathological fracture [M81.0]  Yes    Colitis, collagenous [K52.831]  Yes    Insomnia [G47.00]  Yes      Resolved Hospital Problems   No resolved problems to display.       85 y.o. female admitted with fever and found to have Klebisella and Pseudomonas bacteremia    Unclear source for sepsis, ?GI tract. CT scas negative for nidus of infection. Discussed with her and she would be willing to have c-scope again but would prefer to do it outpatient with Dr Cook.    Cont current abx per ID recs and follow up final sensitivities. Hopefully can dc on po abx      Hoping to dc home soon pending culture results, ?1-2 days      Addison Carter MD  [facility]  05/07/25  15:47 EDT  "

## 2025-05-07 NOTE — THERAPY TREATMENT NOTE
Patient Name: Samantha Davila  : 1940    MRN: [id number]                              Today's Date: 2025       Admit Date: 2025    Visit Dx:     ICD-10-CM ICD-9-CM   1. Fever, unspecified fever cause  R50.9 780.60   2. Acute cystitis without hematuria  N30.00 595.0     Patient Active Problem List   Diagnosis    Neurodermatitis    Insomnia    Viral upper respiratory tract infection    Closed fracture of neck of right femur    Fall    Weight loss    Colitis, collagenous    Age-related osteoporosis without current pathological fracture    Hyperlipidemia    Neck pain    Closed nondisplaced intertrochanteric fracture of left femur    Episodic confusion    Osteoarthritis of left knee    Fever, unknown origin    Sepsis    Bacteremia due to Pseudomonas    Metabolic encephalopathy     Past Medical History:   Diagnosis Date    Arthritis     Asthma     Colitis, collagenous     Colon polyp     Insomnia      Past Surgical History:   Procedure Laterality Date    BACK SURGERY      FEMUR IM NAILING/RODDING Left 11/3/2021    Procedure: FEMUR INTRAMEDULLARY NAILING/RODDING;  Surgeon: Michael Gillespie II, MD;  Location: [facility];  Service: Orthopedics;  Laterality: Left;  [place] Table    ROTATOR CUFF REPAIR Left     TONSILLECTOMY      TOTAL HIP ARTHROPLASTY Right 3/4/2019    Procedure: TOTAL HIP ARTHROPLASTY ANTERIOR WITH HANA TABLE;  Surgeon: Michael Gillespie II, MD;  Location: [facility];  Service: Orthopedics      General Information       Row Name 25 1534          Physical Therapy Time and Intention    Document Type therapy note (daily note)  -ST     Mode of Treatment physical therapy;individual therapy  -ST       Row Name 25 1534          General Information    Patient Profile Reviewed yes  -ST     Existing Precautions/Restrictions fall  -ST       Row Name 25 1534          Cognition    Orientation Status (Cognition) oriented x 3  -ST       Row Name 25 1534           Safety Issues/Impairments Affecting Functional Mobility    Impairments Affecting Function (Mobility) balance;endurance/activity tolerance;strength  -ST     Comment, Safety Issues/Impairments (Mobility) nonskid socks, gait belt donned  -ST               User Key  (r) = Recorded By, (t) = Taken By, (c) = Cosigned By      Initials Name Provider Type    Karol Bashir PT Physical Therapist                   Mobility       Row Name 05/07/25 1534          Bed Mobility    Comment, (Bed Mobility) in bathroom upon arrival, [facility] at end of session  -       Row Name 05/07/25 1534          Sit-Stand Transfer    Sit-Stand [place] (Transfers) contact guard  -ST     Assistive Device (Sit-Stand Transfers) walker, front-wheeled  -ST     Comment, (Sit-Stand Transfer) cues for hand placement on commode for improved ease  -       Row Name 05/07/25 1534          Gait/Stairs (Locomotion)    [place] Level (Gait) contact guard  -ST     Assistive Device (Gait) walker, front-wheeled  -ST     Distance in Feet (Gait) 100  -ST     Deviations/Abnormal Patterns (Gait) khoa decreased;stride length decreased  -ST     Bilateral Gait Deviations forward flexed posture  -ST     Comment, (Gait/Stairs) VC to stay closer to walker for improved posture  -ST               User Key  (r) = Recorded By, (t) = Taken By, (c) = Cosigned By      Initials Name Provider Type    Karol Bashir PT Physical Therapist                   Obj/Interventions       Row Name 05/07/25 1535          Balance    Comment, Balance facilitated standing balance while pt indep performs pericare and dons underwear - VC for safety awareness with lines. no overt LOB. mild unsteadiness d/t forward flexed posture and keeping walker too far out in front  -ST               User Key  (r) = Recorded By, (t) = Taken By, (c) = Cosigned By      Initials Name Provider Type    Karol Bashir PT Physical Therapist                   Goals/Plan    No  documentation.                  Clinical Impression       Row Name 05/07/25 1536          Pain    Pretreatment Pain Rating 0/10 - no pain  -ST     Posttreatment Pain Rating 0/10 - no pain  -ST       Row Name 05/07/25 1536          Plan of Care Review    Plan of Care Reviewed With patient  -ST     Progress improving  -ST     Outcome Evaluation Pt seen for PT this PM with improved activity tolerance. Ambulates up to 100' with rwx and CGA. Pt with tendency to keep walker too far out in front - VC for technique which improved posture as well. Pt uses bathroom during session. VC for safety awareness of lines throughout. Intermittent redirect to task as pt is easily distracted. Plans return to [facility], recommend home therapy at d/c.  -ST       Row Name 05/07/25 1536          Therapy Assessment/Plan (PT)    Rehab Potential (PT) good  -ST     Criteria for Skilled Interventions Met (PT) yes  -ST     Therapy Frequency (PT) 5 times/wk  -ST       Row Name 05/07/25 1536          Positioning and Restraints    Pre-Treatment Position bathroom  -ST     Post Treatment Position chair  -ST     In Chair reclined;call light within reach;encouraged to call for assist;exit alarm on  -ST               User Key  (r) = Recorded By, (t) = Taken By, (c) = Cosigned By      Initials Name Provider Type    Karol Bashir PT Physical Therapist                   Outcome Measures       Row Name 05/07/25 1533          How much help from another person do you currently need...    Turning from your back to your side while in flat bed without using bedrails? 4  -ST     Moving from lying on back to sitting on the side of a flat bed without bedrails? 3  -ST     Moving to and from a bed to a chair (including a wheelchair)? 3  -ST     Standing up from a chair using your arms (e.g., wheelchair, bedside chair)? 3  -ST     Climbing 3-5 steps with a railing? 3  -ST     To walk in hospital room? 3  -ST     AM-PAC 6 Clicks Score (PT) 19  -ST     Highest Level  of Mobility Goal 6 --> Walk 10 steps or more  -ST               User Key  (r) = Recorded By, (t) = Taken By, (c) = Cosigned By      Initials Name Provider Type    ST Karol Cuenca PT Physical Therapist                                 Physical Therapy Education       Title: PT OT SLP Therapies (In Progress)       Topic: Physical Therapy (Done)       Point: Mobility training (Done)       Learning Progress Summary            Patient Acceptance, E,TB, VU,NR by ST at 5/7/2025 1538    Acceptance, E,D, VU,NR by MS at 5/6/2025 1146                      Point: Home exercise program (Done)       Learning Progress Summary            Patient Acceptance, E,D, VU,NR by MS at 5/6/2025 1146                      Point: Body mechanics (Done)       Learning Progress Summary            Patient Acceptance, E,TB, VU,NR by ST at 5/7/2025 1538    Acceptance, E,D, VU,NR by MS at 5/6/2025 1146                      Point: Precautions (Done)       Learning Progress Summary            Patient Acceptance, E,D, VU,NR by MS at 5/6/2025 1146                                      User Key       Initials Effective Dates Name Provider Type Discipline    MS 06/16/21 -  Addison Wagner, PT Physical Therapist PT     09/22/22 -  Karol Cuenca PT Physical Therapist PT                  PT Recommendation and Plan     Progress: improving  Outcome Evaluation: Pt seen for PT this PM with improved activity tolerance. Ambulates up to 100' with rwx and CGA. Pt with tendency to keep walker too far out in front - VC for technique which improved posture as well. Pt uses bathroom during session. VC for safety awareness of lines throughout. Intermittent redirect to task as pt is easily distracted. Plans return to [facility], recommend home therapy at d/c.     Time Calculation:         PT Charges       Row Name 05/07/25 1538             Time Calculation    Start Time 1436  -ST      Stop Time 1455  -ST      Time Calculation (min) 19 min  -ST      PT Received On  05/07/25  -ST      PT - Next Appointment 05/08/25  -ST         Time Calculation- PT    Total Timed Code Minutes- PT 19 minute(s)  -ST         Timed Charges    16971 - PT Therapeutic Activity Minutes 19  -ST         Total Minutes    Timed Charges Total Minutes 19  -ST       Total Minutes 19  -ST                User Key  (r) = Recorded By, (t) = Taken By, (c) = Cosigned By      Initials Name Provider Type    ST Karol Cuenca, PT Physical Therapist                  Therapy Charges for Today       Code Description Service Date Service Provider Modifiers Qty    28170306781 HC PT THERAPEUTIC ACT EA 15 MIN 5/7/2025 Karol Cuenca, PT GP 1            PT G-Codes  Outcome Measure Options: AM-PAC 6 Clicks Daily Activity (OT)  AM-PAC 6 Clicks Score (PT): 19  AM-PAC 6 Clicks Score (OT): 19  PT Discharge Summary  Anticipated Discharge Disposition (PT): home with assist, home with home health, assisted living    Karol Cuenca, PT  5/7/2025

## 2025-05-08 PROCEDURE — 97530 THERAPEUTIC ACTIVITIES: CPT

## 2025-05-08 PROCEDURE — 25010000002 CEFEPIME PER 500 MG: Performed by: INTERNAL MEDICINE

## 2025-05-08 RX ORDER — SIMETHICONE 80 MG
80 TABLET,CHEWABLE ORAL 4 TIMES DAILY PRN
Status: DISCONTINUED | OUTPATIENT
Start: 2025-05-08 | End: 2025-05-09 | Stop reason: HOSPADM

## 2025-05-08 RX ORDER — ZOLPIDEM TARTRATE 5 MG/1
5 TABLET ORAL NIGHTLY PRN
Status: DISCONTINUED | OUTPATIENT
Start: 2025-05-08 | End: 2025-05-09 | Stop reason: HOSPADM

## 2025-05-08 RX ADMIN — ZOLPIDEM TARTRATE 5 MG: 5 TABLET ORAL at 22:23

## 2025-05-08 RX ADMIN — Medication 1 TABLET: at 09:15

## 2025-05-08 RX ADMIN — ACETAMINOPHEN 325MG 650 MG: 325 TABLET ORAL at 09:14

## 2025-05-08 RX ADMIN — BUDESONIDE 9 MG: 3 CAPSULE ORAL at 09:18

## 2025-05-08 RX ADMIN — CEFEPIME 2000 MG: 2 INJECTION, POWDER, FOR SOLUTION INTRAVENOUS at 22:22

## 2025-05-08 RX ADMIN — Medication 10 ML: at 09:19

## 2025-05-08 RX ADMIN — CETIRIZINE HYDROCHLORIDE 5 MG: 10 TABLET, FILM COATED ORAL at 09:15

## 2025-05-08 RX ADMIN — CEFEPIME 2000 MG: 2 INJECTION, POWDER, FOR SOLUTION INTRAVENOUS at 12:52

## 2025-05-08 RX ADMIN — CEFEPIME 2000 MG: 2 INJECTION, POWDER, FOR SOLUTION INTRAVENOUS at 05:11

## 2025-05-08 RX ADMIN — Medication 2.5 MG: at 22:22

## 2025-05-08 RX ADMIN — Medication 2000 UNITS: at 09:15

## 2025-05-08 NOTE — PLAN OF CARE
Goal Outcome Evaluation:                 VSS. This RN ambulated patient around nurses station and throughout hallways on 6th floor with walker and gaitbelt for safety. Patient tolerated well except she did desat in the 80's, but states when she walks and is focussed sometimes she holds her breath and that is what she did.  She voices being upset that she is still here and would like discharged.

## 2025-05-08 NOTE — THERAPY TREATMENT NOTE
Patient Name: Samantha Davila  : 1940    MRN: [id number]                              Today's Date: 2025       Admit Date: 2025    Visit Dx:     ICD-10-CM ICD-9-CM   1. Fever, unspecified fever cause  R50.9 780.60   2. Acute cystitis without hematuria  N30.00 595.0     Patient Active Problem List   Diagnosis    Neurodermatitis    Insomnia    Viral upper respiratory tract infection    Closed fracture of neck of right femur    Fall    Weight loss    Colitis, collagenous    Age-related osteoporosis without current pathological fracture    Hyperlipidemia    Neck pain    Closed nondisplaced intertrochanteric fracture of left femur    Episodic confusion    Osteoarthritis of left knee    Fever, unknown origin    Sepsis    Bacteremia due to Pseudomonas    Metabolic encephalopathy     Past Medical History:   Diagnosis Date    Arthritis     Asthma     Colitis, collagenous     Colon polyp     Insomnia      Past Surgical History:   Procedure Laterality Date    BACK SURGERY      FEMUR IM NAILING/RODDING Left 11/3/2021    Procedure: FEMUR INTRAMEDULLARY NAILING/RODDING;  Surgeon: Michael Gillespie II, MD;  Location: [facility];  Service: Orthopedics;  Laterality: Left;  [place] Table    ROTATOR CUFF REPAIR Left     TONSILLECTOMY      TOTAL HIP ARTHROPLASTY Right 3/4/2019    Procedure: TOTAL HIP ARTHROPLASTY ANTERIOR WITH HANA TABLE;  Surgeon: Michael Gillespie II, MD;  Location: [facility];  Service: Orthopedics      General Information       Row Name 25 1544          Physical Therapy Time and Intention    Document Type therapy note (daily note)  -MS     Mode of Treatment physical therapy;individual therapy  -MS       Row Name 25 1544          General Information    Patient Profile Reviewed yes  -MS     Existing Precautions/Restrictions fall   Exit alarm  -MS     Barriers to Rehab none identified  -MS       Row Name 25 1541          Cognition    Orientation Status (Cognition)  oriented x 3  -MS       Row Name 05/08/25 1544          Safety Issues/Impairments Affecting Functional Mobility    Comment, Safety Issues/Impairments (Mobility) Gait belt used for safety.  -MS               User Key  (r) = Recorded By, (t) = Taken By, (c) = Cosigned By      Initials Name Provider Type    Addison Sumner, PT Physical Therapist                   Mobility       Row Name 05/08/25 1544          Bed Mobility    Comment, (Bed Mobility) Up in chair this PM.  -MS       Row Name 05/08/25 1544          Sit-Stand Transfer    Sit-Stand [place] (Transfers) contact guard  -MS     Assistive Device (Sit-Stand Transfers) walker, front-wheeled  -MS       Row Name 05/08/25 1544          Gait/Stairs (Locomotion)    [place] Level (Gait) contact guard  -MS     Assistive Device (Gait) walker, front-wheeled  -MS     Distance in Feet (Gait) 120  -MS     Deviations/Abnormal Patterns (Gait) khoa decreased  -MS     Bilateral Gait Deviations forward flexed posture  -MS     Comment, (Gait/Stairs) Verbal/tactile cues given for posture correction.  -MS               User Key  (r) = Recorded By, (t) = Taken By, (c) = Cosigned By      Initials Name Provider Type    Addison Sumner, PT Physical Therapist                   Obj/Interventions       Row Name 05/08/25 1545          Motor Skills    Therapeutic Exercise --  BLE ther. ex. program x 10 reps completed (Ankle pumps, Hip Flexion, LAQ's)  -MS               User Key  (r) = Recorded By, (t) = Taken By, (c) = Cosigned By      Initials Name Provider Type    Addison Sumner, PT Physical Therapist                   Goals/Plan    No documentation.                  Clinical Impression       Row Name 05/08/25 1545          Pain    Pretreatment Pain Rating 0/10 - no pain  -MS     Posttreatment Pain Rating 0/10 - no pain  -MS       Row Name 05/08/25 1545          Positioning and Restraints    Pre-Treatment Position sitting in chair/recliner  -MS     Post Treatment  Position chair  -MS     In Chair notified nsg;reclined;sitting;call light within reach;encouraged to call for assist;exit alarm on  All lines intact.  -MS               User Key  (r) = Recorded By, (t) = Taken By, (c) = Cosigned By      Initials Name Provider Type    Addison Sumner RK, PT Physical Therapist                   Outcome Measures       Row Name 05/08/25 1546 05/08/25 1249       How much help from another person do you currently need...    Turning from your back to your side while in flat bed without using bedrails? 3  -MS 4  -AC    Moving from lying on back to sitting on the side of a flat bed without bedrails? 3  -MS 4  -AC    Moving to and from a bed to a chair (including a wheelchair)? 3  -MS 3  -AC    Standing up from a chair using your arms (e.g., wheelchair, bedside chair)? 3  -MS 3  -AC    Climbing 3-5 steps with a railing? 3  -MS 3  -AC    To walk in hospital room? 3  -MS 3  -AC    AM-PAC 6 Clicks Score (PT) 18  -MS 20  -AC    Highest Level of Mobility Goal 6 --> Walk 10 steps or more  -MS 6 --> Walk 10 steps or more  -AC      Row Name 05/08/25 0913 05/08/25 0400       How much help from another person do you currently need...    Turning from your back to your side while in flat bed without using bedrails? 4  -AC 4  -SC    Moving from lying on back to sitting on the side of a flat bed without bedrails? 4  -AC 4  -SC    Moving to and from a bed to a chair (including a wheelchair)? 3  -AC 4  -SC    Standing up from a chair using your arms (e.g., wheelchair, bedside chair)? 3  -AC 4  -SC    Climbing 3-5 steps with a railing? 2  -AC 3  -SC    To walk in hospital room? 2  -AC 4  -SC    AM-PAC 6 Clicks Score (PT) 18  -AC 23  -SC    Highest Level of Mobility Goal 6 --> Walk 10 steps or more  -AC 7 --> Walk 25 feet or more  -SC      Row Name 05/08/25 1546          Functional Assessment    Outcome Measure Options AM-PAC 6 Clicks Basic Mobility (PT)  -MS               User Key  (r) = Recorded By, (t) =  Taken By, (c) = Cosigned By      Initials Name Provider Type    MS BernardAddison, PT Physical Therapist    Tamiko Cardona, RN Registered Nurse    Isidra Delacruz, RN Registered Nurse                                 Physical Therapy Education       Title: PT OT SLP Therapies (In Progress)       Topic: Physical Therapy (Done)       Point: Mobility training (Done)       Learning Progress Summary            Patient Acceptance, E,D, VU,NR by MS at 5/8/2025 1546    Acceptance, E,TB, VU,NR by ST at 5/7/2025 1538    Acceptance, E,D, VU,NR by MS at 5/6/2025 1146                      Point: Home exercise program (Done)       Learning Progress Summary            Patient Acceptance, E,D, VU,NR by MS at 5/8/2025 1546    Acceptance, E,D, VU,NR by MS at 5/6/2025 1146                      Point: Body mechanics (Done)       Learning Progress Summary            Patient Acceptance, E,D, VU,NR by MS at 5/8/2025 1546    Acceptance, E,TB, VU,NR by ST at 5/7/2025 1538    Acceptance, E,D, VU,NR by MS at 5/6/2025 1146                      Point: Precautions (Done)       Learning Progress Summary            Patient Acceptance, E,D, VU,NR by MS at 5/8/2025 1546    Acceptance, E,D, VU,NR by MS at 5/6/2025 1146                                      User Key       Initials Effective Dates Name Provider Type Discipline    MS 06/16/21 -  WagnerAddison, PT Physical Therapist PT     09/22/22 -  Karol Cuenca PT Physical Therapist PT                  PT Recommendation and Plan  Planned Therapy Interventions (PT): balance training, bed mobility training, gait training, home exercise program, patient/family education, postural re-education, transfer training, strengthening  Outcome Evaluation: Upon entering room, pt. sitting up in chair, awake/alert, and agreeable to work with P.T. this date.  This PM, pt. able to ambulate 120 feet, CGA x 1, with use of Rwx.  Pt. requires CGA x 1 for sit <-> stand transfers. BLE ther. ex.  program x 10 reps completed for general strengthening. Verbal/tactile cues given during ambulation for posture correction.  Overall improved tolerance to functional activity this date with an increase in gait distance.  Will continue to progress functional mobility as tolerated.     Time Calculation:         PT Charges       Row Name 05/08/25 1549             Time Calculation    Start Time 1405  -MS      Stop Time 1420  -MS      Time Calculation (min) 15 min  -MS      PT Received On 05/08/25  -MS      PT - Next Appointment 05/09/25  -MS         Time Calculation- PT    Total Timed Code Minutes- PT 14 minute(s)  -MS                User Key  (r) = Recorded By, (t) = Taken By, (c) = Cosigned By      Initials Name Provider Type    Addison Sumner, PT Physical Therapist                  Therapy Charges for Today       Code Description Service Date Service Provider Modifiers Qty    26202423822  PT THERAPEUTIC ACT EA 15 MIN 5/8/2025 Addison Wagner, PT GP 1            PT G-Codes  Outcome Measure Options: AM-PAC 6 Clicks Basic Mobility (PT)  AM-PAC 6 Clicks Score (PT): 18  AM-PAC 6 Clicks Score (OT): 19  PT Discharge Summary  Anticipated Discharge Disposition (PT): home with assist, home with home health    Addison Wagner, PT  5/8/2025

## 2025-05-08 NOTE — PLAN OF CARE
Goal Outcome Evaluation:  Plan of Care Reviewed With: patient           Outcome Evaluation: Upon entering room, pt. sitting up in chair, awake/alert, and agreeable to work with P.T. this date.  This PM, pt. able to ambulate 120 feet, CGA x 1, with use of Rwx.  Pt. requires CGA x 1 for sit <-> stand transfers. BLE ther. ex. program x 10 reps completed for general strengthening. Verbal/tactile cues given during ambulation for posture correction.  Overall improved tolerance to functional activity this date with an increase in gait distance.  Will continue to progress functional mobility as tolerated.    Anticipated Discharge Disposition (PT): home with assist, home with home health

## 2025-05-08 NOTE — PROGRESS NOTES
Name: Samantha Davila ADMIT: 2025   : 1940  PCP: Michelle Lyons MD    MRN: [id number] LOS: 2 days   AGE/SEX: 85 y.o. female  ROOM: [facility]     Subjective   Subjective   Feels OK other than not sleeping well.  Complains of gas pain       Objective   Objective   Vital Signs  Temp:  [97.2 °F (36.2 °C)-97.7 °F (36.5 °C)] 97.2 °F (36.2 °C)  Heart Rate:  [61-73] 73  Resp:  [14-18] 18  BP: (120-148)/(64-83) 120/64  SpO2:  [94 %-98 %] 98 %  on   ;   Device (Oxygen Therapy): room air  Body mass index is 23.87 kg/m².  Physical Exam  Vitals reviewed.   Constitutional:       General: She is not in acute distress.     Appearance: She is not ill-appearing.   Cardiovascular:      Rate and Rhythm: Normal rate. Rhythm irregular.   Pulmonary:      Effort: No respiratory distress.      Breath sounds: Normal breath sounds.   Musculoskeletal:      Right lower leg: No edema.      Left lower leg: No edema.   Skin:     General: Skin is warm and dry.      Findings: Bruising (lower legs) present.   Neurological:      Mental Status: She is alert.       Results Review     I reviewed the patient's new clinical results.  Results from last 7 days   Lab Units 25  0531 25  0537 25  1639   WBC 10*3/mm3 6.77 14.98* 12.49*   HEMOGLOBIN g/dL 12.0 11.9* 12.4   PLATELETS 10*3/mm3 227 258 248     Results from last 7 days   Lab Units 25  0530 25  0537 25  1639   SODIUM mmol/L 139 139 136   POTASSIUM mmol/L 3.6 3.8 3.6   CHLORIDE mmol/L 104 102 100   CO2 mmol/L 27.0 26.8 23.0   BUN mg/dL 7* 11 12   CREATININE mg/dL 0.53* 0.47* 0.54*   GLUCOSE mg/dL 97 105* 113*   EGFR mL/min/1.73 90.8 93.4 90.4     Results from last 7 days   Lab Units 25  0537 25  1639   ALBUMIN g/dL 3.4* 3.8   BILIRUBIN mg/dL 0.6 0.4   ALK PHOS U/L 70 78   AST (SGOT) U/L 19 19   ALT (SGPT) U/L 12 14     Results from last 7 days   Lab Units 25  0530 25  0537 25  1639   CALCIUM mg/dL 8.3* 8.3* 8.3*   ALBUMIN  "g/dL  --  3.4* 3.8     Results from last 7 days   Lab Units 05/05/25  1639   LACTATE mmol/L 1.6     No results found for: \"HGBA1C\", \"POCGLU\"    CT Abdomen Pelvis With Contrast  Result Date: 5/7/2025  Electronically signed by Jani Cantu MD on 05-07-25 at 0339    CT Chest With Contrast Diagnostic  Result Date: 5/7/2025  Electronically signed by Jani Cantu MD on 05-07-25 at 0320      I have personally reviewed all medications:  Scheduled Medications  Budesonide, 9 mg, Oral, QAM  cefepime, 2,000 mg, Intravenous, Q8H  cetirizine, 5 mg, Oral, Daily  cholecalciferol, 2,000 Units, Oral, Daily  melatonin, 2.5 mg, Oral, Nightly  multivitamin with minerals, 1 tablet, Oral, Daily  sodium chloride, 10 mL, Intravenous, Q12H    Infusions   Diet  Diet: Cardiac; Healthy Heart (2-3 Na+); Fluid Consistency: Thin (IDDSI 0)    I have personally reviewed:  [x]  Laboratory   [x]  Microbiology   [x]  Radiology   [x]  EKG/Telemetry  [x]  Cardiology/Vascular   []  Pathology    []  Records       Assessment/Plan     Active Hospital Problems    Diagnosis  POA    **Fever, unknown origin [R50.9]  Yes    Sepsis [A41.9]  Yes    Bacteremia due to Pseudomonas [R78.81, B96.5]  Yes    Metabolic encephalopathy [G93.41]  Yes    Hyperlipidemia [E78.5]  Yes    Age-related osteoporosis without current pathological fracture [M81.0]  Yes    Colitis, collagenous [K52.831]  Yes    Insomnia [G47.00]  Yes      Resolved Hospital Problems   No resolved problems to display.       85 y.o. female admitted with fever and found to have Klebisella and Pseudomonas bacteremia      Unclear source for sepsis, ?GI tract. CT scans negative for nidus of infection.  Patient planning outpatient follow-up for colonoscopy with Dr Cook.    Reviewed micro, waiting on final sensitivity of Pseudomonas before making decision for discharge.  Continue cefepime for now.  Discussed with Dr Lares    Restarting home dose Ambien.  Added simethicone to use as needed for gas pain as " well      Plan to discharge home tomorrow most likely      Addison Carter MD  [facility]  05/08/25  16:39 EDT

## 2025-05-08 NOTE — PROGRESS NOTES
ID note for sepsis  Subjective: Feeling a little bit better.  She is having some pain over the right flank today.  Afebrile  Physical Exam:   Vital Signs   Temp:  [97.2 °F (36.2 °C)-98.4 °F (36.9 °C)] 97.5 °F (36.4 °C)  Heart Rate:  [61-70] 61  Resp:  [14-18] 18  BP: (110-148)/(66-83) 125/68    GENERAL: Awake and alert, in no acute distress.   HEENT: Oropharynx is clear. Hearing is grossly normal.   EYES: . No conjunctival injection. No lid lag.   LUNGS:normal respiratory effort.   SKIN: no cutaneous eruptions in exposed areas  PSYCHIATRIC: Appropriate mood, affect, insight, and judgment.   She is little bit tender over the right flank  Results Review:  White count 6.7  Creatinine 0.53     Blood culture 2/2 gram-negative rods, Pseudomonas and Klebsiella, Klebsiella resistant to ampicillin, cefazolin and ampicillin sulbactam  Legionella and strep urinary antigen and respiratory pathogen panel negative       A/p  1.  Gram-negative septicemia  2.  Infection control: Candida auris negative.  Out of isolation    BCx now with K oxytoca and Pseduomonas.  Continue cefepime  Awaiting sensitivities of Pseudomonas.  If quinolone susceptible could discharge on levofloxacin to complete the course.  Discussed with patient needs to prevent infection including strict hand hygiene  Discussed with Dr. Carter and hope to have everything back tomorrow to finalize antibiotic plan

## 2025-05-09 VITALS
RESPIRATION RATE: 18 BRPM | HEIGHT: 66 IN | HEART RATE: 72 BPM | BODY MASS INDEX: 23.77 KG/M2 | TEMPERATURE: 98.2 F | WEIGHT: 147.9 LBS | DIASTOLIC BLOOD PRESSURE: 73 MMHG | SYSTOLIC BLOOD PRESSURE: 135 MMHG | OXYGEN SATURATION: 97 %

## 2025-05-09 PROBLEM — G93.41 METABOLIC ENCEPHALOPATHY: Status: RESOLVED | Noted: 2025-05-06 | Resolved: 2025-05-09

## 2025-05-09 PROBLEM — R50.9 FEVER, UNKNOWN ORIGIN: Status: RESOLVED | Noted: 2025-05-05 | Resolved: 2025-05-09

## 2025-05-09 LAB
ANION GAP SERPL CALCULATED.3IONS-SCNC: 10.2 MMOL/L (ref 5–15)
BACTERIA SPEC AEROBE CULT: ABNORMAL
BACTERIA SPEC AEROBE CULT: ABNORMAL
BUN SERPL-MCNC: 7 MG/DL (ref 8–23)
BUN/CREAT SERPL: 14.9 (ref 7–25)
CALCIUM SPEC-SCNC: 8.2 MG/DL (ref 8.6–10.5)
CHLORIDE SERPL-SCNC: 106 MMOL/L (ref 98–107)
CO2 SERPL-SCNC: 25.8 MMOL/L (ref 22–29)
CREAT SERPL-MCNC: 0.47 MG/DL (ref 0.57–1)
DEPRECATED RDW RBC AUTO: 43.1 FL (ref 37–54)
EGFRCR SERPLBLD CKD-EPI 2021: 93.4 ML/MIN/1.73
ERYTHROCYTE [DISTWIDTH] IN BLOOD BY AUTOMATED COUNT: 12.9 % (ref 12.3–15.4)
GLUCOSE SERPL-MCNC: 90 MG/DL (ref 65–99)
GRAM STN SPEC: ABNORMAL
GRAM STN SPEC: ABNORMAL
HCT VFR BLD AUTO: 35.5 % (ref 34–46.6)
HGB BLD-MCNC: 11.9 G/DL (ref 12–15.9)
ISOLATED FROM: ABNORMAL
ISOLATED FROM: ABNORMAL
MCH RBC QN AUTO: 30.7 PG (ref 26.6–33)
MCHC RBC AUTO-ENTMCNC: 33.5 G/DL (ref 31.5–35.7)
MCV RBC AUTO: 91.7 FL (ref 79–97)
PLATELET # BLD AUTO: 256 10*3/MM3 (ref 140–450)
PMV BLD AUTO: 9.4 FL (ref 6–12)
POTASSIUM SERPL-SCNC: 3.8 MMOL/L (ref 3.5–5.2)
RBC # BLD AUTO: 3.87 10*6/MM3 (ref 3.77–5.28)
SODIUM SERPL-SCNC: 142 MMOL/L (ref 136–145)
WBC NRBC COR # BLD AUTO: 6.14 10*3/MM3 (ref 3.4–10.8)

## 2025-05-09 PROCEDURE — 25010000002 CEFEPIME PER 500 MG: Performed by: INTERNAL MEDICINE

## 2025-05-09 PROCEDURE — 80048 BASIC METABOLIC PNL TOTAL CA: CPT | Performed by: HOSPITALIST

## 2025-05-09 PROCEDURE — 85027 COMPLETE CBC AUTOMATED: CPT | Performed by: HOSPITALIST

## 2025-05-09 RX ORDER — LEVOFLOXACIN 750 MG/1
750 TABLET, FILM COATED ORAL DAILY
Qty: 7 TABLET | Refills: 0 | Status: SHIPPED | OUTPATIENT
Start: 2025-05-09 | End: 2025-05-09

## 2025-05-09 RX ORDER — LEVOFLOXACIN 750 MG/1
750 TABLET, FILM COATED ORAL DAILY
Qty: 4 TABLET | Refills: 0 | Status: SHIPPED | OUTPATIENT
Start: 2025-05-09

## 2025-05-09 RX ADMIN — Medication 1 TABLET: at 09:37

## 2025-05-09 RX ADMIN — Medication 2000 UNITS: at 09:37

## 2025-05-09 RX ADMIN — CEFEPIME 2000 MG: 2 INJECTION, POWDER, FOR SOLUTION INTRAVENOUS at 04:06

## 2025-05-09 RX ADMIN — BUDESONIDE 9 MG: 3 CAPSULE ORAL at 09:38

## 2025-05-09 RX ADMIN — CETIRIZINE HYDROCHLORIDE 5 MG: 10 TABLET, FILM COATED ORAL at 09:37

## 2025-05-09 RX ADMIN — Medication 10 ML: at 09:38

## 2025-05-09 NOTE — DISCHARGE SUMMARY
Patient Name: Samantha Davila  : 1940  MRN: [id number]    Date of Admission: 2025  Date of Discharge:  2025  Primary Care Physician: Michelle Lyons MD      Chief Complaint:   Fever and Nausea      Discharge Diagnoses     Active Hospital Problems    Diagnosis  POA    Sepsis [A41.9]  Yes    Bacteremia due to Pseudomonas [R78.81, B96.5]  Yes    Hyperlipidemia [E78.5]  Yes    Age-related osteoporosis without current pathological fracture [M81.0]  Yes    Colitis, collagenous [K52.831]  Yes    Insomnia [G47.00]  Yes      Resolved Hospital Problems    Diagnosis Date Resolved POA    **Fever, unknown origin [R50.9] 2025 Yes    Metabolic encephalopathy [G93.41] 2025 Yes        Hospital Course     Ms. Davila is a 85 y.o. female with a history of collagenous colitis who presented to [facility] initially complaining of fevers and chills.  Please see the admitting history and physical for further details.  She was found to have leukocytosis and fever on arrival but normal lactate.  She had no obvious source for infection.  Her urinalysis showed 4+ bacteria but no white cells so was not felt to be the source.  Blood cultures obtained in ER did become positive for Klebsiella and Pseudomonas.  She was sent for imaging of her torso which did not show any focus of infection.  She was followed by infectious disease while here.  Now that final blood culture sensitivities were obtained showing sensitivity to quinolones she is stable for discharge home on oral therapy.  She has been afebrile with no complaints over the last couple of days.  She has been advised to follow-up with gastroenterology for colonoscopy given gram-negative bacteremia with no other overt source.  This could relate to her prior history of colitis.    Day of Discharge     Subjective:  No events.  She feels good    Physical Exam:  Temp:  [97.2 °F (36.2 °C)-98.4 °F (36.9 °C)] 98.2 °F (36.8 °C)  Heart Rate:  [66-73]  72  Resp:  [14-18] 18  BP: (120-144)/(64-73) 135/73  Body mass index is 23.87 kg/m².  Physical Exam  Vitals reviewed.   Constitutional:       General: She is not in acute distress.     Appearance: She is not ill-appearing.   Cardiovascular:      Rate and Rhythm: Normal rate.   Pulmonary:      Effort: No respiratory distress.   Skin:     General: Skin is warm and dry.      Findings: Bruising (lower legs) present.   Neurological:      Mental Status: She is alert.   Psychiatric:         Mood and Affect: Mood normal.         Consultants     Consult Orders (all) (From admission, onward)       Start     Ordered    05/06/25 0925  Inpatient Infectious Diseases Consult  Once        Specialty:  Infectious Diseases  Provider:  Monico Brody MD    05/06/25 0925 05/05/25 1913  [facility] (on-call MD unless specified) Details  Once        Specialty:  Hospitalist  Provider:  (Not yet assigned)    05/05/25 1912                  Procedures     Imaging Results (All)       Procedure Component Value Units Date/Time    CT Abdomen Pelvis With Contrast [[id number]] Collected: 05/07/25 0616     Updated: 05/07/25 0616    Narrative:        Patient: SHENG ROSA  Time Out: 03:39  Exam(s): CT ABDOMEN + PELVIS With Contrast     EXAM:    CT Abdomen and Pelvis With Intravenous Contrast    CLINICAL HISTORY:    abdominal pain, sepsis     TECHNIQUE:    Axial computed tomography images of the abdomen and pelvis with   intravenous contrast with coronal and sagittal reformats.  CTDI is 8.05   mGy and DLP is 518.8 mGy-cm.  This CT exam was performed according to the   principle of ALARA (As Low As Reasonably Achievable) by using one or more   of the following dose reduction techniques: automated exposure control,   adjustment of the mA and or kV according to patient size, and or use of   iterative reconstruction technique.    COMPARISON:    No relevant prior studies available.    FINDINGS:     ABDOMEN:    Liver:  Consider additional evaluation  of atypical liver morphology,   potentially incidental or chronic liver disease.    Gallbladder and bile ducts:  Unremarkable.    Pancreas:  Unremarkable.    Spleen:  Unremarkable.    Adrenals:  Unremarkable.    Kidneys and ureters:  Unremarkable.    Stomach and bowel:  Colonic diverticula. Otherwise unremarkable bowel.     PELVIS:    Appendix:  No findings to suggest acute appendicitis.    Bladder:  Unremarkable.    Reproductive:  Unremarkable as visualized.     ABDOMEN and PELVIS:    Intraperitoneal space:  No pneumoperitoneum.    Bones joints:  RIGHT total hip arthroplasty.  LEFT femur intramedullary   augusto.  Osteopenia.    Soft tissues:  Unremarkable.    Vasculature:  Atherosclerotic arterial calcifications: moderate. No   aortic aneurysm or dissection. Arterial structures otherwise unremarkable.      Lymph nodes:  Unremarkable.    IMPRESSION:       1.  No acute abnormality.  2.  See additional findings above.      Impression:          Electronically signed by Jani Cantu MD on 05-07-25 at 0339    CT Chest With Contrast Diagnostic [[id number]] Collected: 05/07/25 0321     Updated: 05/07/25 0321    Narrative:        Patient: SHENG ROSA  Time Out: 03:20  Exam(s): CT CHEST With Contrast     EXAM:    CT Chest With Intravenous Contrast    CLINICAL HISTORY:     Reason for exam: Bacteremia, cough, sepsis.    TECHNIQUE:    Axial computed tomography images of the chest with intravenous contrast   with coronal and sagittal reformats.  CTDI is 8.05 mGy and DLP is 518.8   mGy-cm.  This CT exam was performed according to the principle of ALARA   (As Low As Reasonably Achievable) by using one or more of the following   dose reduction techniques: automated exposure control, adjustment of the   mA and or kV according to patient size, and or use of iterative   reconstruction technique.    COMPARISON:    No relevant prior studies available.    FINDINGS:    Lungs:  Unremarkable.    Pleural space:  Unremarkable.    Heart:   Cardiomegaly.  Moderate coronary artery calcifications.    Bones joints:  Manubrium nonacute appearing fracture.  No acute finding.      Vasculature:  Atherosclerotic arterial calcifications: moderate. No   aortic aneurysm or dissection. Arterial structures otherwise unremarkable.    Vascular structures: Otherwise unremarkable.  Bilateral main pulmonary   artery enlargement 3.6 cm on the RIGHT and 3.3 cm on the LEFT.    Lymph nodes:  No concerning adenopathy.    IMPRESSION:       1.  No acute abnormality.  2.  Bilateral main pulmonary artery enlargement 3.6 cm on the RIGHT and 3.  3 cm on the LEFT.  Possible pulmonary arterial hypertension, consider   echocardiogram.  3.  See additional findings above.      Impression:          Electronically signed by Jani Cantu MD on 05-07-25 at 0320    XR Chest 1 View [[id number]] Collected: 05/05/25 1741     Updated: 05/05/25 1746    Narrative:      XR CHEST 1 VW-     HISTORY: 85-year-old female with fever and nausea.     FINDINGS: There is pulmonary vascular congestion and there may be small  pleural effusions. Query CHF. No focal airspace consolidations are seen  to suggest pneumonia. Follow-up is recommended with 2 views of the  chest.     This report was finalized on 5/5/2025 5:43 PM by Dr. Susan Steiner M.D on  Workstation: [id number]                 Pertinent Labs     Results from last 7 days   Lab Units 05/09/25  0557 05/07/25  0531 05/06/25  0537 05/05/25  1639   WBC 10*3/mm3 6.14 6.77 14.98* 12.49*   HEMOGLOBIN g/dL 11.9* 12.0 11.9* 12.4   PLATELETS 10*3/mm3 256 227 258 248     Results from last 7 days   Lab Units 05/09/25  0557 05/07/25  0530 05/06/25  0537 05/05/25  1639   SODIUM mmol/L 142 139 139 136   POTASSIUM mmol/L 3.8 3.6 3.8 3.6   CHLORIDE mmol/L 106 104 102 100   CO2 mmol/L 25.8 27.0 26.8 23.0   BUN mg/dL 7* 7* 11 12   CREATININE mg/dL 0.47* 0.53* 0.47* 0.54*   GLUCOSE mg/dL 90 97 105* 113*   EGFR mL/min/1.73 93.4 90.8 93.4 90.4     Results from last 7  "days   Lab Units 05/06/25  0537 05/05/25  1639   ALBUMIN g/dL 3.4* 3.8   BILIRUBIN mg/dL 0.6 0.4   ALK PHOS U/L 70 78   AST (SGOT) U/L 19 19   ALT (SGPT) U/L 12 14     Results from last 7 days   Lab Units 05/09/25  0557 05/07/25  0530 05/06/25  0537 05/05/25  1639   CALCIUM mg/dL 8.2* 8.3* 8.3* 8.3*   ALBUMIN g/dL  --   --  3.4* 3.8     Results from last 7 days   Lab Units 05/05/25  1639   LIPASE U/L 17             Invalid input(s): \"LDLCALC\"  Results from last 7 days   Lab Units 05/05/25  1654 05/05/25  1649   BLOODCX  Klebsiella oxytoca*  Pseudomonas aeruginosa* Klebsiella oxytoca*  Pseudomonas aeruginosa*   BCIDPCR   --  Klebsiella oxytoca. Identification by BCID2 PCR*  Pseudomonas aeruginosa. Identification by BCID2 PCR*     Results from last 7 days   Lab Units 05/05/25  1639   COVID19  Not Detected       Test Results Pending at Discharge     Pending Results       None              Discharge Details        Discharge Medications        New Medications        Instructions Start Date   levoFLOXacin 750 MG tablet  Commonly known as: Levaquin   750 mg, Oral, Daily             Continue These Medications        Instructions Start Date   acetaminophen 325 MG tablet  Commonly known as: TYLENOL   650 mg, Every 6 Hours PRN      Budesonide 3 MG 24 hr capsule  Commonly known as: ENTOCORT EC   Take 3 capsules by mouth Every Morning.      cetirizine-pseudoephedrine 5-120 MG per 12 hr tablet  Commonly known as: ZyrTEC-D   1 tablet, Oral, 2 Times Daily      loperamide 2 MG capsule  Commonly known as: IMODIUM   2 mg, 4 Times Daily PRN      multivitamin with minerals tablet tablet   1 tablet, Daily      Vitamin D3 50 MCG (2000 UT) tablet   1 tablet, Daily      zolpidem 5 MG tablet  Commonly known as: AMBIEN   5 mg, Oral, Nightly PRN               Allergies   Allergen Reactions    Quinine Derivatives     Sulfa Antibiotics        Discharge Disposition:  Home or Self Care      Discharge Diet:  Diet Order   Procedures    Diet: " Cardiac; Healthy Heart (2-3 Na+); Fluid Consistency: Thin (IDDSI 0)       Discharge Activity:       CODE STATUS:    Code Status and Medical Interventions: CPR (Attempt to Resuscitate); Full Support   Ordered at: 05/06/25 0252     Code Status (Patient has no pulse and is not breathing):    CPR (Attempt to Resuscitate)     Medical Interventions (Patient has pulse or is breathing):    Full Support       No future appointments.   Follow-up Information       Michelle Lyons MD Follow up in 1 week(s).    Specialty: Internal Medicine  Contact information:  [id number]  [facility] [address] [phone]                             Time Spent on Discharge:  Greater than 30 minutes      Addison Carter MD  [facility]  05/09/25  09:43 EDT

## 2025-05-09 NOTE — PLAN OF CARE
Goal Outcome Evaluation:            Pt more calm and decreased frequency of urination. Ambulating well with walker. VSS. Hoping for discharge back to assisted living facility today.

## 2025-05-09 NOTE — CASE MANAGEMENT/SOCIAL WORK
Continued Stay Note  [facility]     Patient Name: Samantha Davila  MRN: [id number]  Today's Date: 5/9/2025    Admit Date: 5/5/2025    Plan: Return to [facility] with    Discharge Plan       Row Name 05/09/25 1123       Plan    Plan Return to [facility] with     Patient/Family in Agreement with Plan yes    Plan Comments Plan for dc today, return to custodial, asked RN to send pt with copy of dc summary and AVS. Scheduled  w/c sofia, estimated time is 1615 unless has cancellation. Pt agreeable, she will update her family. No further dc needs, CCP will follow- Savita FUNEZ                   Discharge Codes    No documentation.                 Expected Discharge Date and Time       Expected Discharge Date Expected Discharge Time    May 9, 2025               Savita Dumont RN

## 2025-05-09 NOTE — PROGRESS NOTES
ID note for sepsis  Subjective: Feeling a little bit better.  Pain is less conspicuous over the right flank afebrile  Physical Exam:   Vital Signs   Temp:  [97.2 °F (36.2 °C)-98.4 °F (36.9 °C)] 98.2 °F (36.8 °C)  Heart Rate:  [66-73] 72  Resp:  [14-18] 18  BP: (120-144)/(64-73) 135/73    GENERAL: Awake and alert, in no acute distress.   HEENT: Oropharynx is clear. Hearing is grossly normal.   EYES: . No conjunctival injection. No lid lag.   LUNGS:normal respiratory effort.   SKIN: no cutaneous eruptions in exposed areas  PSYCHIATRIC: Appropriate mood, affect, insight, and judgment.   No CVA tenderness  Results Review:  White count 6.14  Creatinine 0.47     Blood culture 2/2 gram-negative rods, Pseudomonas and Klebsiella, Klebsiella resistant to ampicillin, cefazolin and ampicillin sulbactam  Legionella and strep urinary antigen and respiratory pathogen panel negative       A/p  1.  Gram-negative septicemia  2.  Infection control: Candida auris negative.  Out of isolation    BCx now with K oxytoca and Pseduomonas.  Continue cefepime  Sensitivities of the Pseudomonas returned and was quinolone susceptible  Discussed with patient needs to prevent infection including strict hand hygiene  Discussed with Dr. Carter and no objection to discharge on levofloxacin 750 mg p.o. every 24 hours to complete 7 days of therapy.  Discussed with patient proper dosing administration and side effects of the antibiotics.  No objection to discharge when okay with others  With antibiotic plan in place we will sign off.  Please call with questions or concerns

## 2025-05-10 ENCOUNTER — READMISSION MANAGEMENT (OUTPATIENT)
Dept: CALL CENTER | Facility: HOSPITAL | Age: 85
End: 2025-05-10
Payer: MEDICARE

## 2025-05-10 LAB
BACTERIA SPEC AEROBE CULT: ABNORMAL
GRAM STN SPEC: ABNORMAL
GRAM STN SPEC: ABNORMAL
ISOLATED FROM: ABNORMAL

## 2025-05-10 NOTE — OUTREACH NOTE
Prep Survey      Flowsheet Row Responses   [facility] patient discharged from? [facility]   Is LACE score < 7 ? No   Eligibility Readm Mgmt   Discharge diagnosis Fever, unknown origin   Does the patient have one of the following disease processes/diagnoses(primary or secondary)? Sepsis   Does the patient have Home health ordered? No   Is there a DME ordered? No   Medication alerts for this patient [facility]   Prep survey completed? Yes            Luiza BULLARD - Registered Nurse              Luiza BULLARD - Registered Nurse

## 2025-05-16 ENCOUNTER — READMISSION MANAGEMENT (OUTPATIENT)
Dept: CALL CENTER | Facility: HOSPITAL | Age: 85
End: 2025-05-16
Payer: MEDICARE

## 2025-05-16 NOTE — OUTREACH NOTE
Sepsis Week 1 Survey      Flowsheet Row Responses   [facility] patient discharged from? [place]   Does the patient have one of the following disease processes/diagnoses(primary or secondary)? Sepsis   Week 1 attempt successful? Yes   Call start time 1347   Call end time 1348   Discharge diagnosis Fever, unknown origin   Person spoke with today (if not patient) and relationship spouse   Meds reviewed with patient/caregiver? Yes   Is the patient having any side effects they believe may be caused by any medication additions or changes? No   Does the patient have all medications related to this admission filled (includes all antibiotics, inhalers, nebulizers,steroids,etc.) Yes   Prescription comments Completed antibiotic   Is the patient taking all medications as directed (includes completed medication regime)? Yes   Does the patient have a primary care provider?  Yes   Comments regarding PCP Dr Lyons   Does the patient have an appointment with their PCP within 7 days of discharge? Yes   Has the patient kept scheduled appointments due by today? Yes   Psychosocial issues? No   Did the patient receive a copy of their discharge instructions? Yes   Nursing interventions Reviewed instructions with patient   What is the patient's perception of their health status since discharge? Improving   Is patient/caregiver able to teach back steps to recovery at home? Set small, achievable goals for return to baseline health, Rest and regain strength   If the patient is a current smoker, are they able to teach back resources for cessation? Not a smoker   Is the patient/caregiver able to teach back the hierarchy of who to call/visit for symptoms/problems? PCP, Specialist, Home health nurse, Urgent Care, ED, 911 Yes   Week 1 call completed? Yes   Graduated Yes   Is the patient interested in additional calls from an ambulatory ? No   Would this patient benefit from a Referral to Amb Social Work? No   Wrap up additional  comments John states patient is doing very well.  She is not home lesa.  Has seen PCP.  Completed abx.  No needs.   Call end time [id number]            ANDRES FABIAN - Registered Nurse

## 2025-08-13 ENCOUNTER — APPOINTMENT (OUTPATIENT)
Dept: GENERAL RADIOLOGY | Facility: HOSPITAL | Age: 85
DRG: 690 | End: 2025-08-13
Payer: MEDICARE

## 2025-08-13 ENCOUNTER — HOSPITAL ENCOUNTER (INPATIENT)
Facility: HOSPITAL | Age: 85
LOS: 4 days | Discharge: HOME | DRG: 690 | End: 2025-08-17
Attending: EMERGENCY MEDICINE | Admitting: INTERNAL MEDICINE
Payer: MEDICARE

## 2025-08-13 ENCOUNTER — APPOINTMENT (OUTPATIENT)
Dept: CT IMAGING | Facility: HOSPITAL | Age: 85
DRG: 690 | End: 2025-08-13
Payer: MEDICARE

## 2025-08-13 DIAGNOSIS — R53.1 GENERALIZED WEAKNESS: ICD-10-CM

## 2025-08-13 DIAGNOSIS — A41.9 SEPSIS, DUE TO UNSPECIFIED ORGANISM, UNSPECIFIED WHETHER ACUTE ORGAN DYSFUNCTION PRESENT: ICD-10-CM

## 2025-08-13 DIAGNOSIS — D72.829 LEUKOCYTOSIS, UNSPECIFIED TYPE: ICD-10-CM

## 2025-08-13 DIAGNOSIS — R09.02 HYPOXIA: Primary | ICD-10-CM

## 2025-08-13 LAB
ALBUMIN SERPL-MCNC: 3.6 G/DL (ref 3.5–5.2)
ALBUMIN/GLOB SERPL: 1.4 G/DL
ALP SERPL-CCNC: 95 U/L (ref 39–117)
ALT SERPL W P-5'-P-CCNC: 9 U/L (ref 1–33)
ANION GAP SERPL CALCULATED.3IONS-SCNC: 11 MMOL/L (ref 5–15)
AST SERPL-CCNC: 17 U/L (ref 1–32)
B PARAPERT DNA SPEC QL NAA+PROBE: NOT DETECTED
B PERT DNA SPEC QL NAA+PROBE: NOT DETECTED
BACTERIA UR QL AUTO: ABNORMAL /HPF
BASOPHILS # BLD MANUAL: 0 10*3/MM3 (ref 0–0.2)
BASOPHILS NFR BLD MANUAL: 0 % (ref 0–1.5)
BILIRUB SERPL-MCNC: 0.9 MG/DL (ref 0–1.2)
BILIRUB UR QL STRIP: NEGATIVE
BUN SERPL-MCNC: 9 MG/DL (ref 8–23)
BUN/CREAT SERPL: 14.8 (ref 7–25)
C PNEUM DNA NPH QL NAA+NON-PROBE: NOT DETECTED
CALCIUM SPEC-SCNC: 8.3 MG/DL (ref 8.6–10.5)
CHLORIDE SERPL-SCNC: 99 MMOL/L (ref 98–107)
CLARITY UR: ABNORMAL
CO2 SERPL-SCNC: 25 MMOL/L (ref 22–29)
COLOR UR: YELLOW
CREAT SERPL-MCNC: 0.61 MG/DL (ref 0.57–1)
D-LACTATE SERPL-SCNC: 0.6 MMOL/L (ref 0.5–2)
DEPRECATED RDW RBC AUTO: 44.1 FL (ref 37–54)
EGFRCR SERPLBLD CKD-EPI 2021: 87.7 ML/MIN/1.73
EOSINOPHIL # BLD MANUAL: 0 10*3/MM3 (ref 0–0.4)
EOSINOPHIL NFR BLD MANUAL: 0 % (ref 0.3–6.2)
ERYTHROCYTE [DISTWIDTH] IN BLOOD BY AUTOMATED COUNT: 12.9 % (ref 12.3–15.4)
FLUAV SUBTYP SPEC NAA+PROBE: NOT DETECTED
FLUBV RNA NPH QL NAA+NON-PROBE: NOT DETECTED
GLOBULIN UR ELPH-MCNC: 2.5 GM/DL
GLUCOSE SERPL-MCNC: 122 MG/DL (ref 65–99)
GLUCOSE UR STRIP-MCNC: NEGATIVE MG/DL
HADV DNA SPEC NAA+PROBE: NOT DETECTED
HCOV 229E RNA SPEC QL NAA+PROBE: NOT DETECTED
HCOV HKU1 RNA SPEC QL NAA+PROBE: NOT DETECTED
HCOV NL63 RNA SPEC QL NAA+PROBE: NOT DETECTED
HCOV OC43 RNA SPEC QL NAA+PROBE: NOT DETECTED
HCT VFR BLD AUTO: 40.4 % (ref 34–46.6)
HGB BLD-MCNC: 13.1 G/DL (ref 12–15.9)
HGB UR QL STRIP.AUTO: ABNORMAL
HMPV RNA NPH QL NAA+NON-PROBE: NOT DETECTED
HPIV1 RNA ISLT QL NAA+PROBE: NOT DETECTED
HPIV2 RNA SPEC QL NAA+PROBE: NOT DETECTED
HPIV3 RNA NPH QL NAA+PROBE: NOT DETECTED
HPIV4 P GENE NPH QL NAA+PROBE: NOT DETECTED
HYALINE CASTS UR QL AUTO: ABNORMAL /LPF
KETONES UR QL STRIP: ABNORMAL
LEUKOCYTE ESTERASE UR QL STRIP.AUTO: ABNORMAL
LYMPHOCYTES # BLD MANUAL: 2.07 10*3/MM3 (ref 0.7–3.1)
LYMPHOCYTES NFR BLD MANUAL: 9 % (ref 5–12)
M PNEUMO IGG SER IA-ACNC: NOT DETECTED
MCH RBC QN AUTO: 30.4 PG (ref 26.6–33)
MCHC RBC AUTO-ENTMCNC: 32.4 G/DL (ref 31.5–35.7)
MCV RBC AUTO: 93.7 FL (ref 79–97)
MONOCYTES # BLD: 1.55 10*3/MM3 (ref 0.1–0.9)
NEUTROPHILS # BLD AUTO: 13.62 10*3/MM3 (ref 1.7–7)
NEUTROPHILS NFR BLD MANUAL: 79 % (ref 42.7–76)
NITRITE UR QL STRIP: NEGATIVE
PH UR STRIP.AUTO: 6 [PH] (ref 5–8)
PLAT MORPH BLD: NORMAL
PLATELET # BLD AUTO: 264 10*3/MM3 (ref 140–450)
PMV BLD AUTO: 10.4 FL (ref 6–12)
POTASSIUM SERPL-SCNC: 3.5 MMOL/L (ref 3.5–5.2)
PROCALCITONIN SERPL-MCNC: 0.15 NG/ML (ref 0–0.25)
PROT SERPL-MCNC: 6.1 G/DL (ref 6–8.5)
PROT UR QL STRIP: ABNORMAL
QT INTERVAL: 345 MS
QTC INTERVAL: 440 MS
RBC # BLD AUTO: 4.31 10*6/MM3 (ref 3.77–5.28)
RBC # UR STRIP: ABNORMAL /HPF
RBC MORPH BLD: NORMAL
REF LAB TEST METHOD: ABNORMAL
RHINOVIRUS RNA SPEC NAA+PROBE: NOT DETECTED
RSV RNA NPH QL NAA+NON-PROBE: NOT DETECTED
SARS-COV-2 RNA NPH QL NAA+NON-PROBE: NOT DETECTED
SODIUM SERPL-SCNC: 135 MMOL/L (ref 136–145)
SP GR UR STRIP: 1.01 (ref 1–1.03)
SQUAMOUS #/AREA URNS HPF: ABNORMAL /HPF
TROPONIN T SERPL HS-MCNC: 23 NG/L
UROBILINOGEN UR QL STRIP: ABNORMAL
VARIANT LYMPHS NFR BLD MANUAL: 12 % (ref 19.6–45.3)
WBC # UR STRIP: ABNORMAL /HPF
WBC MORPH BLD: NORMAL
WBC NRBC COR # BLD AUTO: 17.24 10*3/MM3 (ref 3.4–10.8)

## 2025-08-13 PROCEDURE — 71045 X-RAY EXAM CHEST 1 VIEW: CPT

## 2025-08-13 PROCEDURE — 93005 ELECTROCARDIOGRAM TRACING: CPT | Performed by: EMERGENCY MEDICINE

## 2025-08-13 PROCEDURE — 87154 CUL TYP ID BLD PTHGN 6+ TRGT: CPT | Performed by: EMERGENCY MEDICINE

## 2025-08-13 PROCEDURE — 25810000003 SODIUM CHLORIDE 0.9 % SOLUTION: Performed by: INTERNAL MEDICINE

## 2025-08-13 PROCEDURE — 25010000002 CEFEPIME PER 500 MG: Performed by: EMERGENCY MEDICINE

## 2025-08-13 PROCEDURE — 87086 URINE CULTURE/COLONY COUNT: CPT | Performed by: EMERGENCY MEDICINE

## 2025-08-13 PROCEDURE — 87040 BLOOD CULTURE FOR BACTERIA: CPT | Performed by: EMERGENCY MEDICINE

## 2025-08-13 PROCEDURE — 80053 COMPREHEN METABOLIC PANEL: CPT | Performed by: EMERGENCY MEDICINE

## 2025-08-13 PROCEDURE — 85007 BL SMEAR W/DIFF WBC COUNT: CPT | Performed by: EMERGENCY MEDICINE

## 2025-08-13 PROCEDURE — P9612 CATHETERIZE FOR URINE SPEC: HCPCS

## 2025-08-13 PROCEDURE — 84484 ASSAY OF TROPONIN QUANT: CPT | Performed by: EMERGENCY MEDICINE

## 2025-08-13 PROCEDURE — 85025 COMPLETE CBC W/AUTO DIFF WBC: CPT | Performed by: EMERGENCY MEDICINE

## 2025-08-13 PROCEDURE — 87088 URINE BACTERIA CULTURE: CPT | Performed by: EMERGENCY MEDICINE

## 2025-08-13 PROCEDURE — 74177 CT ABD & PELVIS W/CONTRAST: CPT

## 2025-08-13 PROCEDURE — 71275 CT ANGIOGRAPHY CHEST: CPT

## 2025-08-13 PROCEDURE — 99291 CRITICAL CARE FIRST HOUR: CPT

## 2025-08-13 PROCEDURE — 87186 SC STD MICRODIL/AGAR DIL: CPT | Performed by: EMERGENCY MEDICINE

## 2025-08-13 PROCEDURE — 83605 ASSAY OF LACTIC ACID: CPT | Performed by: EMERGENCY MEDICINE

## 2025-08-13 PROCEDURE — 81001 URINALYSIS AUTO W/SCOPE: CPT | Performed by: EMERGENCY MEDICINE

## 2025-08-13 PROCEDURE — 93010 ELECTROCARDIOGRAM REPORT: CPT | Performed by: INTERNAL MEDICINE

## 2025-08-13 PROCEDURE — 84145 PROCALCITONIN (PCT): CPT | Performed by: EMERGENCY MEDICINE

## 2025-08-13 PROCEDURE — 36415 COLL VENOUS BLD VENIPUNCTURE: CPT

## 2025-08-13 PROCEDURE — 0202U NFCT DS 22 TRGT SARS-COV-2: CPT | Performed by: EMERGENCY MEDICINE

## 2025-08-13 PROCEDURE — 25510000001 IOPAMIDOL PER 1 ML: Performed by: INTERNAL MEDICINE

## 2025-08-13 RX ORDER — FUROSEMIDE 40 MG/1
20 TABLET ORAL EVERY MORNING
Status: DISCONTINUED | OUTPATIENT
Start: 2025-08-14 | End: 2025-08-17 | Stop reason: HOSPADM

## 2025-08-13 RX ORDER — BISACODYL 5 MG/1
5 TABLET, DELAYED RELEASE ORAL DAILY PRN
Status: DISCONTINUED | OUTPATIENT
Start: 2025-08-13 | End: 2025-08-15

## 2025-08-13 RX ORDER — SODIUM CHLORIDE 0.9 % (FLUSH) 0.9 %
10 SYRINGE (ML) INJECTION AS NEEDED
Status: DISCONTINUED | OUTPATIENT
Start: 2025-08-13 | End: 2025-08-17 | Stop reason: HOSPADM

## 2025-08-13 RX ORDER — ALBUTEROL SULFATE 0.83 MG/ML
2.5 SOLUTION RESPIRATORY (INHALATION) EVERY 6 HOURS PRN
Status: DISCONTINUED | OUTPATIENT
Start: 2025-08-13 | End: 2025-08-17 | Stop reason: HOSPADM

## 2025-08-13 RX ORDER — MULTIPLE VITAMINS W/ MINERALS TAB 9MG-400MCG
1 TAB ORAL DAILY
Status: DISCONTINUED | OUTPATIENT
Start: 2025-08-14 | End: 2025-08-17 | Stop reason: HOSPADM

## 2025-08-13 RX ORDER — SODIUM CHLORIDE 9 MG/ML
75 INJECTION, SOLUTION INTRAVENOUS CONTINUOUS
Status: ACTIVE | OUTPATIENT
Start: 2025-08-13 | End: 2025-08-14

## 2025-08-13 RX ORDER — MECLIZINE HCL 12.5 MG 12.5 MG/1
12.5 TABLET ORAL 3 TIMES DAILY PRN
COMMUNITY

## 2025-08-13 RX ORDER — ACETAMINOPHEN 325 MG/1
650 TABLET ORAL EVERY 4 HOURS PRN
Status: DISCONTINUED | OUTPATIENT
Start: 2025-08-13 | End: 2025-08-17 | Stop reason: HOSPADM

## 2025-08-13 RX ORDER — IOPAMIDOL 755 MG/ML
100 INJECTION, SOLUTION INTRAVASCULAR
Status: COMPLETED | OUTPATIENT
Start: 2025-08-13 | End: 2025-08-13

## 2025-08-13 RX ORDER — FUROSEMIDE 20 MG/1
20 TABLET ORAL EVERY MORNING
COMMUNITY

## 2025-08-13 RX ORDER — CHOLECALCIFEROL (VITAMIN D3) 25 MCG
2000 TABLET ORAL DAILY
Status: DISCONTINUED | OUTPATIENT
Start: 2025-08-14 | End: 2025-08-17 | Stop reason: HOSPADM

## 2025-08-13 RX ORDER — ALBUTEROL SULFATE 90 UG/1
2 INHALANT RESPIRATORY (INHALATION) EVERY 6 HOURS PRN
COMMUNITY

## 2025-08-13 RX ORDER — ONDANSETRON 4 MG/1
4 TABLET, ORALLY DISINTEGRATING ORAL EVERY 6 HOURS PRN
Status: DISCONTINUED | OUTPATIENT
Start: 2025-08-13 | End: 2025-08-17 | Stop reason: HOSPADM

## 2025-08-13 RX ORDER — AMOXICILLIN 250 MG
2 CAPSULE ORAL 2 TIMES DAILY PRN
Status: DISCONTINUED | OUTPATIENT
Start: 2025-08-13 | End: 2025-08-15

## 2025-08-13 RX ORDER — POLYETHYLENE GLYCOL 3350 17 G/17G
17 POWDER, FOR SOLUTION ORAL DAILY PRN
Status: DISCONTINUED | OUTPATIENT
Start: 2025-08-13 | End: 2025-08-15

## 2025-08-13 RX ORDER — ONDANSETRON 2 MG/ML
4 INJECTION INTRAMUSCULAR; INTRAVENOUS EVERY 6 HOURS PRN
Status: DISCONTINUED | OUTPATIENT
Start: 2025-08-13 | End: 2025-08-17 | Stop reason: HOSPADM

## 2025-08-13 RX ORDER — POLYETHYLENE GLYCOL 3350 17 G/17G
17 POWDER, FOR SOLUTION ORAL DAILY
COMMUNITY

## 2025-08-13 RX ORDER — BISACODYL 10 MG
10 SUPPOSITORY, RECTAL RECTAL DAILY PRN
Status: DISCONTINUED | OUTPATIENT
Start: 2025-08-13 | End: 2025-08-15

## 2025-08-13 RX ORDER — ESCITALOPRAM OXALATE 10 MG/1
5 TABLET ORAL NIGHTLY
Status: DISCONTINUED | OUTPATIENT
Start: 2025-08-13 | End: 2025-08-17 | Stop reason: HOSPADM

## 2025-08-13 RX ORDER — ESCITALOPRAM OXALATE 5 MG/1
5 TABLET ORAL NIGHTLY
COMMUNITY

## 2025-08-13 RX ORDER — ACETAMINOPHEN 650 MG/1
650 SUPPOSITORY RECTAL EVERY 4 HOURS PRN
Status: DISCONTINUED | OUTPATIENT
Start: 2025-08-13 | End: 2025-08-17 | Stop reason: HOSPADM

## 2025-08-13 RX ORDER — ONDANSETRON 4 MG/1
4 TABLET, FILM COATED ORAL EVERY 6 HOURS PRN
COMMUNITY
End: 2025-08-17 | Stop reason: HOSPADM

## 2025-08-13 RX ORDER — ACETAMINOPHEN 160 MG/5ML
650 SOLUTION ORAL EVERY 4 HOURS PRN
Status: DISCONTINUED | OUTPATIENT
Start: 2025-08-13 | End: 2025-08-17 | Stop reason: HOSPADM

## 2025-08-13 RX ORDER — GUAIFENESIN AND DEXTROMETHORPHAN HYDROBROMIDE 10; 100 MG/5ML; MG/5ML
10 SYRUP ORAL EVERY 4 HOURS PRN
COMMUNITY
End: 2025-08-17 | Stop reason: HOSPADM

## 2025-08-13 RX ADMIN — Medication 2.5 MG: at 22:42

## 2025-08-13 RX ADMIN — ACETAMINOPHEN 650 MG: 325 TABLET ORAL at 22:41

## 2025-08-13 RX ADMIN — ESCITALOPRAM 5 MG: 10 TABLET, FILM COATED ORAL at 22:41

## 2025-08-13 RX ADMIN — IOPAMIDOL 95 ML: 755 INJECTION, SOLUTION INTRAVENOUS at 18:29

## 2025-08-13 RX ADMIN — APIXABAN 2.5 MG: 2.5 TABLET, FILM COATED ORAL at 22:42

## 2025-08-13 RX ADMIN — SODIUM CHLORIDE 75 ML/HR: 9 INJECTION, SOLUTION INTRAVENOUS at 22:48

## 2025-08-13 RX ADMIN — CEFEPIME 2000 MG: 2 INJECTION, POWDER, FOR SOLUTION INTRAVENOUS at 18:59

## 2025-08-14 PROBLEM — R78.81 E COLI BACTEREMIA: Status: ACTIVE | Noted: 2025-08-14

## 2025-08-14 PROBLEM — N10 ACUTE PYELONEPHRITIS: Status: ACTIVE | Noted: 2025-08-14

## 2025-08-14 PROBLEM — B96.20 E COLI BACTEREMIA: Status: ACTIVE | Noted: 2025-08-14

## 2025-08-14 LAB
ANION GAP SERPL CALCULATED.3IONS-SCNC: 8 MMOL/L (ref 5–15)
BACTERIA BLD CULT: ABNORMAL
BOTTLE TYPE: ABNORMAL
BUN SERPL-MCNC: 13 MG/DL (ref 8–23)
BUN/CREAT SERPL: 18.3 (ref 7–25)
CALCIUM SPEC-SCNC: 7.8 MG/DL (ref 8.6–10.5)
CHLORIDE SERPL-SCNC: 103 MMOL/L (ref 98–107)
CO2 SERPL-SCNC: 26 MMOL/L (ref 22–29)
CREAT SERPL-MCNC: 0.71 MG/DL (ref 0.57–1)
DEPRECATED RDW RBC AUTO: 43.8 FL (ref 37–54)
EGFRCR SERPLBLD CKD-EPI 2021: 83.4 ML/MIN/1.73
ERYTHROCYTE [DISTWIDTH] IN BLOOD BY AUTOMATED COUNT: 12.5 % (ref 12.3–15.4)
GEN 5 1HR TROPONIN T REFLEX: 23 NG/L
GLUCOSE SERPL-MCNC: 116 MG/DL (ref 65–99)
HCT VFR BLD AUTO: 35.1 % (ref 34–46.6)
HGB BLD-MCNC: 11.2 G/DL (ref 12–15.9)
MCH RBC QN AUTO: 30.5 PG (ref 26.6–33)
MCHC RBC AUTO-ENTMCNC: 31.9 G/DL (ref 31.5–35.7)
MCV RBC AUTO: 95.6 FL (ref 79–97)
PLATELET # BLD AUTO: 249 10*3/MM3 (ref 140–450)
PMV BLD AUTO: 9.8 FL (ref 6–12)
POTASSIUM SERPL-SCNC: 3.8 MMOL/L (ref 3.5–5.2)
RBC # BLD AUTO: 3.67 10*6/MM3 (ref 3.77–5.28)
SODIUM SERPL-SCNC: 137 MMOL/L (ref 136–145)
TROPONIN T % DELTA: 0
TROPONIN T NUMERIC DELTA: 0 NG/L
WBC NRBC COR # BLD AUTO: 17.31 10*3/MM3 (ref 3.4–10.8)

## 2025-08-14 PROCEDURE — 85027 COMPLETE CBC AUTOMATED: CPT | Performed by: INTERNAL MEDICINE

## 2025-08-14 PROCEDURE — 97110 THERAPEUTIC EXERCISES: CPT

## 2025-08-14 PROCEDURE — 80048 BASIC METABOLIC PNL TOTAL CA: CPT | Performed by: INTERNAL MEDICINE

## 2025-08-14 PROCEDURE — 25010000002 CEFEPIME PER 500 MG: Performed by: INTERNAL MEDICINE

## 2025-08-14 PROCEDURE — 97535 SELF CARE MNGMENT TRAINING: CPT

## 2025-08-14 PROCEDURE — 97166 OT EVAL MOD COMPLEX 45 MIN: CPT

## 2025-08-14 PROCEDURE — 97162 PT EVAL MOD COMPLEX 30 MIN: CPT

## 2025-08-14 PROCEDURE — 36415 COLL VENOUS BLD VENIPUNCTURE: CPT | Performed by: INTERNAL MEDICINE

## 2025-08-14 PROCEDURE — 84484 ASSAY OF TROPONIN QUANT: CPT | Performed by: EMERGENCY MEDICINE

## 2025-08-14 RX ADMIN — CEFEPIME 2000 MG: 2 INJECTION, POWDER, FOR SOLUTION INTRAVENOUS at 04:07

## 2025-08-14 RX ADMIN — CEFEPIME 2000 MG: 2 INJECTION, POWDER, FOR SOLUTION INTRAVENOUS at 11:36

## 2025-08-14 RX ADMIN — FUROSEMIDE 20 MG: 40 TABLET ORAL at 08:43

## 2025-08-14 RX ADMIN — ACETAMINOPHEN 650 MG: 325 TABLET ORAL at 08:47

## 2025-08-14 RX ADMIN — Medication 1 TABLET: at 08:42

## 2025-08-14 RX ADMIN — ACETAMINOPHEN 650 MG: 325 TABLET ORAL at 18:35

## 2025-08-14 RX ADMIN — ACETAMINOPHEN 650 MG: 325 TABLET ORAL at 22:15

## 2025-08-14 RX ADMIN — ESCITALOPRAM 5 MG: 10 TABLET, FILM COATED ORAL at 21:03

## 2025-08-14 RX ADMIN — Medication 10 ML: at 18:32

## 2025-08-14 RX ADMIN — CEFEPIME 2000 MG: 2 INJECTION, POWDER, FOR SOLUTION INTRAVENOUS at 18:32

## 2025-08-14 RX ADMIN — Medication 2000 UNITS: at 08:43

## 2025-08-14 RX ADMIN — Medication 2.5 MG: at 22:16

## 2025-08-15 ENCOUNTER — APPOINTMENT (OUTPATIENT)
Dept: CARDIOLOGY | Facility: HOSPITAL | Age: 85
DRG: 690 | End: 2025-08-15
Payer: MEDICARE

## 2025-08-15 LAB
AORTIC DIMENSIONLESS INDEX: 0.28 (DI)
ASCENDING AORTA: 3.4 CM
AV MEAN PRESS GRAD SYS DOP V1V2: 18.3 MMHG
AV VMAX SYS DOP: 278.3 CM/SEC
BACTERIA SPEC AEROBE CULT: ABNORMAL
BH CV ECHO LEFT ATRIAL STRAIN: 15.5 %
BH CV ECHO MEAS - ACS: 0.75 CM
BH CV ECHO MEAS - AO MAX PG: 31 MMHG
BH CV ECHO MEAS - AO ROOT AREA (BSA CORRECTED): 1.7 CM2
BH CV ECHO MEAS - AO ROOT DIAM: 3 CM
BH CV ECHO MEAS - AO V2 VTI: 57.8 CM
BH CV ECHO MEAS - AVA(I,D): 0.92 CM2
BH CV ECHO MEAS - EDV(CUBED): 125.6 ML
BH CV ECHO MEAS - EDV(MOD-SP2): 94 ML
BH CV ECHO MEAS - EDV(MOD-SP4): 97 ML
BH CV ECHO MEAS - EF(MOD-SP2): 63.8 %
BH CV ECHO MEAS - EF(MOD-SP4): 57.7 %
BH CV ECHO MEAS - ESV(CUBED): 43 ML
BH CV ECHO MEAS - ESV(MOD-SP2): 34 ML
BH CV ECHO MEAS - ESV(MOD-SP4): 41 ML
BH CV ECHO MEAS - FS: 30 %
BH CV ECHO MEAS - IVS/LVPW: 1.06 CM
BH CV ECHO MEAS - IVSD: 0.89 CM
BH CV ECHO MEAS - LAT PEAK E' VEL: 7.4 CM/SEC
BH CV ECHO MEAS - LV DIASTOLIC VOL/BSA (35-75): 56.6 CM2
BH CV ECHO MEAS - LV MASS(C)D: 150.3 GRAMS
BH CV ECHO MEAS - LV MAX PG: 2.9 MMHG
BH CV ECHO MEAS - LV MEAN PG: 1.61 MMHG
BH CV ECHO MEAS - LV SYSTOLIC VOL/BSA (12-30): 23.9 CM2
BH CV ECHO MEAS - LV V1 MAX: 84.8 CM/SEC
BH CV ECHO MEAS - LV V1 VTI: 16.3 CM
BH CV ECHO MEAS - LVIDD: 5 CM
BH CV ECHO MEAS - LVIDS: 3.5 CM
BH CV ECHO MEAS - LVOT AREA: 3.3 CM2
BH CV ECHO MEAS - LVOT DIAM: 2.03 CM
BH CV ECHO MEAS - LVPWD: 0.84 CM
BH CV ECHO MEAS - MED PEAK E' VEL: 5.4 CM/SEC
BH CV ECHO MEAS - MV A DUR: 0.11 SEC
BH CV ECHO MEAS - MV A MAX VEL: 101.5 CM/SEC
BH CV ECHO MEAS - MV DEC SLOPE: 219.3 CM/SEC2
BH CV ECHO MEAS - MV DEC TIME: 0.21 SEC
BH CV ECHO MEAS - MV E MAX VEL: 54 CM/SEC
BH CV ECHO MEAS - MV E/A: 0.53
BH CV ECHO MEAS - MV MAX PG: 4.6 MMHG
BH CV ECHO MEAS - MV MEAN PG: 1.67 MMHG
BH CV ECHO MEAS - MV P1/2T: 103.3 MSEC
BH CV ECHO MEAS - MV V2 VTI: 26.6 CM
BH CV ECHO MEAS - MVA(P1/2T): 2.13 CM2
BH CV ECHO MEAS - MVA(VTI): 1.99 CM2
BH CV ECHO MEAS - PA ACC TIME: 0.11 SEC
BH CV ECHO MEAS - PA V2 MAX: 90.9 CM/SEC
BH CV ECHO MEAS - RAP SYSTOLE: 3 MMHG
BH CV ECHO MEAS - RV MAX PG: 2 MMHG
BH CV ECHO MEAS - RV V1 MAX: 70.7 CM/SEC
BH CV ECHO MEAS - RV V1 VTI: 11.5 CM
BH CV ECHO MEAS - RVSP: 33 MMHG
BH CV ECHO MEAS - SV(LVOT): 52.9 ML
BH CV ECHO MEAS - SV(MOD-SP2): 60 ML
BH CV ECHO MEAS - SV(MOD-SP4): 56 ML
BH CV ECHO MEAS - SVI(LVOT): 30.8 ML/M2
BH CV ECHO MEAS - SVI(MOD-SP2): 35 ML/M2
BH CV ECHO MEAS - SVI(MOD-SP4): 32.6 ML/M2
BH CV ECHO MEAS - TAPSE (>1.6): 1.95 CM
BH CV ECHO MEAS - TR MAX PG: 30.7 MMHG
BH CV ECHO MEAS - TR MAX VEL: 276.9 CM/SEC
BH CV ECHO MEASUREMENTS AVERAGE E/E' RATIO: 8.44
BH CV XLRA - RV BASE: 4.1 CM
BH CV XLRA - RV LENGTH: 6.5 CM
BH CV XLRA - RV MID: 3.4 CM
BH CV XLRA - TDI S': 12.1 CM/SEC
LEFT ATRIUM VOLUME INDEX: 39.6 ML/M2
LV EF BIPLANE MOD: 60.2 %
SINUS: 2.9 CM
STJ: 2.8 CM

## 2025-08-15 PROCEDURE — 93356 MYOCRD STRAIN IMG SPCKL TRCK: CPT | Performed by: INTERNAL MEDICINE

## 2025-08-15 PROCEDURE — 93356 MYOCRD STRAIN IMG SPCKL TRCK: CPT

## 2025-08-15 PROCEDURE — 25010000002 CEFTRIAXONE PER 250 MG: Performed by: HOSPITALIST

## 2025-08-15 PROCEDURE — 93306 TTE W/DOPPLER COMPLETE: CPT

## 2025-08-15 PROCEDURE — 25010000002 CEFEPIME PER 500 MG: Performed by: INTERNAL MEDICINE

## 2025-08-15 PROCEDURE — 93306 TTE W/DOPPLER COMPLETE: CPT | Performed by: INTERNAL MEDICINE

## 2025-08-15 PROCEDURE — 25510000001 PERFLUTREN 6.52 MG/ML SUSPENSION 2 ML VIAL: Performed by: INTERNAL MEDICINE

## 2025-08-15 RX ORDER — HYDROXYZINE HYDROCHLORIDE 10 MG/1
10 TABLET, FILM COATED ORAL ONCE AS NEEDED
Status: COMPLETED | OUTPATIENT
Start: 2025-08-15 | End: 2025-08-15

## 2025-08-15 RX ADMIN — ACETAMINOPHEN 650 MG: 325 TABLET ORAL at 22:05

## 2025-08-15 RX ADMIN — Medication 2.5 MG: at 22:06

## 2025-08-15 RX ADMIN — FUROSEMIDE 20 MG: 40 TABLET ORAL at 06:55

## 2025-08-15 RX ADMIN — Medication 1 TABLET: at 08:18

## 2025-08-15 RX ADMIN — ESCITALOPRAM 5 MG: 10 TABLET, FILM COATED ORAL at 22:05

## 2025-08-15 RX ADMIN — ACETAMINOPHEN 650 MG: 325 TABLET ORAL at 13:58

## 2025-08-15 RX ADMIN — CEFTRIAXONE SODIUM 2000 MG: 2 INJECTION, POWDER, FOR SOLUTION INTRAMUSCULAR; INTRAVENOUS at 12:00

## 2025-08-15 RX ADMIN — HYDROXYZINE HYDROCHLORIDE 10 MG: 10 TABLET ORAL at 23:54

## 2025-08-15 RX ADMIN — Medication 2000 UNITS: at 08:18

## 2025-08-15 RX ADMIN — PERFLUTREN 2 ML: 6.52 INJECTION, SUSPENSION INTRAVENOUS at 10:57

## 2025-08-15 RX ADMIN — ACETAMINOPHEN 650 MG: 325 TABLET ORAL at 02:59

## 2025-08-15 RX ADMIN — CEFEPIME 2000 MG: 2 INJECTION, POWDER, FOR SOLUTION INTRAVENOUS at 02:55

## 2025-08-16 LAB
ANION GAP SERPL CALCULATED.3IONS-SCNC: 10.3 MMOL/L (ref 5–15)
BACTERIA SPEC AEROBE CULT: ABNORMAL
BACTERIA SPEC AEROBE CULT: ABNORMAL
BUN SERPL-MCNC: 8 MG/DL (ref 8–23)
BUN/CREAT SERPL: 19.5 (ref 7–25)
CALCIUM SPEC-SCNC: 8.4 MG/DL (ref 8.6–10.5)
CHLORIDE SERPL-SCNC: 106 MMOL/L (ref 98–107)
CO2 SERPL-SCNC: 25.7 MMOL/L (ref 22–29)
CREAT SERPL-MCNC: 0.41 MG/DL (ref 0.57–1)
DEPRECATED RDW RBC AUTO: 40.6 FL (ref 37–54)
EGFRCR SERPLBLD CKD-EPI 2021: 96.6 ML/MIN/1.73
ERYTHROCYTE [DISTWIDTH] IN BLOOD BY AUTOMATED COUNT: 12.2 % (ref 12.3–15.4)
GLUCOSE SERPL-MCNC: 91 MG/DL (ref 65–99)
GRAM STN SPEC: ABNORMAL
HCT VFR BLD AUTO: 34.1 % (ref 34–46.6)
HGB BLD-MCNC: 11.4 G/DL (ref 12–15.9)
ISOLATED FROM: ABNORMAL
ISOLATED FROM: ABNORMAL
MCH RBC QN AUTO: 30.8 PG (ref 26.6–33)
MCHC RBC AUTO-ENTMCNC: 33.4 G/DL (ref 31.5–35.7)
MCV RBC AUTO: 92.2 FL (ref 79–97)
PLATELET # BLD AUTO: 309 10*3/MM3 (ref 140–450)
PMV BLD AUTO: 9.8 FL (ref 6–12)
POTASSIUM SERPL-SCNC: 3.1 MMOL/L (ref 3.5–5.2)
RBC # BLD AUTO: 3.7 10*6/MM3 (ref 3.77–5.28)
SODIUM SERPL-SCNC: 142 MMOL/L (ref 136–145)
WBC NRBC COR # BLD AUTO: 8.11 10*3/MM3 (ref 3.4–10.8)

## 2025-08-16 PROCEDURE — 25010000002 CEFTRIAXONE PER 250 MG: Performed by: HOSPITALIST

## 2025-08-16 PROCEDURE — 80048 BASIC METABOLIC PNL TOTAL CA: CPT | Performed by: HOSPITALIST

## 2025-08-16 PROCEDURE — 85027 COMPLETE CBC AUTOMATED: CPT | Performed by: HOSPITALIST

## 2025-08-16 RX ORDER — POTASSIUM CHLORIDE 1500 MG/1
40 TABLET, EXTENDED RELEASE ORAL ONCE
Status: COMPLETED | OUTPATIENT
Start: 2025-08-16 | End: 2025-08-16

## 2025-08-16 RX ORDER — CETIRIZINE HYDROCHLORIDE 10 MG/1
5 TABLET ORAL DAILY
Status: DISCONTINUED | OUTPATIENT
Start: 2025-08-16 | End: 2025-08-17 | Stop reason: HOSPADM

## 2025-08-16 RX ORDER — ZOLPIDEM TARTRATE 5 MG/1
2.5 TABLET ORAL NIGHTLY PRN
Status: DISCONTINUED | OUTPATIENT
Start: 2025-08-16 | End: 2025-08-17 | Stop reason: HOSPADM

## 2025-08-16 RX ADMIN — ZOLPIDEM TARTRATE 2.5 MG: 5 TABLET ORAL at 22:40

## 2025-08-16 RX ADMIN — FUROSEMIDE 20 MG: 40 TABLET ORAL at 09:27

## 2025-08-16 RX ADMIN — Medication 2000 UNITS: at 09:27

## 2025-08-16 RX ADMIN — ESCITALOPRAM 5 MG: 10 TABLET, FILM COATED ORAL at 22:40

## 2025-08-16 RX ADMIN — POTASSIUM CHLORIDE 40 MEQ: 1500 TABLET, EXTENDED RELEASE ORAL at 13:54

## 2025-08-16 RX ADMIN — POTASSIUM CHLORIDE 40 MEQ: 1500 TABLET, EXTENDED RELEASE ORAL at 18:29

## 2025-08-16 RX ADMIN — CEFTRIAXONE SODIUM 2000 MG: 2 INJECTION, POWDER, FOR SOLUTION INTRAMUSCULAR; INTRAVENOUS at 13:55

## 2025-08-16 RX ADMIN — Medication 1 TABLET: at 09:27

## 2025-08-16 RX ADMIN — CETIRIZINE HYDROCHLORIDE 5 MG: 10 TABLET, FILM COATED ORAL at 13:55

## 2025-08-17 VITALS
BODY MASS INDEX: 23.99 KG/M2 | SYSTOLIC BLOOD PRESSURE: 125 MMHG | HEART RATE: 83 BPM | DIASTOLIC BLOOD PRESSURE: 56 MMHG | RESPIRATION RATE: 18 BRPM | WEIGHT: 143.96 LBS | HEIGHT: 65 IN | OXYGEN SATURATION: 95 % | TEMPERATURE: 98.4 F

## 2025-08-17 LAB
ANION GAP SERPL CALCULATED.3IONS-SCNC: 10 MMOL/L (ref 5–15)
BUN SERPL-MCNC: 7 MG/DL (ref 8–23)
BUN/CREAT SERPL: 14.9 (ref 7–25)
CALCIUM SPEC-SCNC: 8.1 MG/DL (ref 8.6–10.5)
CHLORIDE SERPL-SCNC: 106 MMOL/L (ref 98–107)
CO2 SERPL-SCNC: 25 MMOL/L (ref 22–29)
CREAT SERPL-MCNC: 0.47 MG/DL (ref 0.57–1)
DEPRECATED RDW RBC AUTO: 43.7 FL (ref 37–54)
EGFRCR SERPLBLD CKD-EPI 2021: 93.4 ML/MIN/1.73
ERYTHROCYTE [DISTWIDTH] IN BLOOD BY AUTOMATED COUNT: 12.7 % (ref 12.3–15.4)
GLUCOSE SERPL-MCNC: 95 MG/DL (ref 65–99)
HCT VFR BLD AUTO: 34.8 % (ref 34–46.6)
HGB BLD-MCNC: 11.4 G/DL (ref 12–15.9)
MCH RBC QN AUTO: 30.6 PG (ref 26.6–33)
MCHC RBC AUTO-ENTMCNC: 32.8 G/DL (ref 31.5–35.7)
MCV RBC AUTO: 93.5 FL (ref 79–97)
PLATELET # BLD AUTO: 348 10*3/MM3 (ref 140–450)
PMV BLD AUTO: 9.5 FL (ref 6–12)
POTASSIUM SERPL-SCNC: 3.4 MMOL/L (ref 3.5–5.2)
RBC # BLD AUTO: 3.72 10*6/MM3 (ref 3.77–5.28)
SODIUM SERPL-SCNC: 141 MMOL/L (ref 136–145)
WBC NRBC COR # BLD AUTO: 10.82 10*3/MM3 (ref 3.4–10.8)

## 2025-08-17 PROCEDURE — 80048 BASIC METABOLIC PNL TOTAL CA: CPT | Performed by: INTERNAL MEDICINE

## 2025-08-17 PROCEDURE — 85027 COMPLETE CBC AUTOMATED: CPT | Performed by: INTERNAL MEDICINE

## 2025-08-17 RX ORDER — CEPHALEXIN 500 MG/1
500 CAPSULE ORAL 4 TIMES DAILY
Qty: 28 CAPSULE | Refills: 0 | Status: SHIPPED | OUTPATIENT
Start: 2025-08-17 | End: 2025-08-24

## 2025-08-17 RX ADMIN — ACETAMINOPHEN 650 MG: 325 TABLET ORAL at 08:38

## 2025-08-17 RX ADMIN — Medication 1 TABLET: at 08:37

## 2025-08-17 RX ADMIN — FUROSEMIDE 20 MG: 40 TABLET ORAL at 08:37

## 2025-08-17 RX ADMIN — CETIRIZINE HYDROCHLORIDE 5 MG: 10 TABLET, FILM COATED ORAL at 08:37

## 2025-08-17 RX ADMIN — Medication 2000 UNITS: at 08:37

## (undated) DEVICE — PK ANT HIP 40

## (undated) DEVICE — APPL CHLORAPREP HI/LITE 26ML ORNG

## (undated) DEVICE — MAT FLR ABSORBENT LG 4FT 10 2.5FT

## (undated) DEVICE — NEEDLE, QUINCKE, 20GX3.5": Brand: MEDLINE

## (undated) DEVICE — PICO SINGLE USE NEGATIVE PRESSURE WOUND THERAPY SYSTEM 10CM X 30CM 4IN. X 12IN.: Brand: PICO

## (undated) DEVICE — DRAPE,REIN 53X77,STERILE: Brand: MEDLINE

## (undated) DEVICE — DRSNG WND BORDR/ADHS NONADHR/GZ LF 4X4IN STRL

## (undated) DEVICE — MEDI-VAC YANKAUER SUCTION HANDLE W/BULBOUS TIP: Brand: CARDINAL HEALTH

## (undated) DEVICE — SUT VIC 0 CT1 36IN J946H

## (undated) DEVICE — GLV SURG SENSICARE GREEN W/ALOE PF LF 8.5 STRL

## (undated) DEVICE — VIOLET BRAIDED (POLYGLACTIN 910), SYNTHETIC ABSORBABLE SUTURE: Brand: COATED VICRYL

## (undated) DEVICE — INTERTAN LAG SCREW DRILL: Brand: TRIGEN

## (undated) DEVICE — RECIPROCATING BLADE HEAVY DUTY LONG, OFFSET  (77.6 X 0.77 X 11.2MM)

## (undated) DEVICE — PK HIP PINNING 40

## (undated) DEVICE — STPLR SKIN VISISTAT WD 35CT

## (undated) DEVICE — NDL SPINE 20G 3 1/2 YEL STRL 1P/U

## (undated) DEVICE — DRSNG BURN ACTICOAT FLEX 7 1X24IN

## (undated) DEVICE — DRP C/ARMOR

## (undated) DEVICE — Device

## (undated) DEVICE — GLV SURG PREMIERPRO ORTHO LTX PF SZ8.5 BRN

## (undated) DEVICE — GLV SURG SENSICARE PI MIC PF SZ7 LF STRL

## (undated) DEVICE — GUIDE PIN 3.2MM X 343MM: Brand: TRIGEN

## (undated) DEVICE — PENCL E/S HNDSWCH ROCKR CB

## (undated) DEVICE — BNDG ELAS CO-FLEX SLF ADHR 6IN 5YD LF STRL

## (undated) DEVICE — 4.0MM LONG AO PILOT DRILL: Brand: TRIGEN

## (undated) DEVICE — GLV SURG BIOGEL LTX PF 7

## (undated) DEVICE — ELECTRD BLD EXT EDGE 1P COAT 6.5IN STRL

## (undated) DEVICE — DISPOSABLE ORTHOPAEDIC PROTECTOR: Brand: ALEXIS ® ORTHOPAEDIC PROTECTOR

## (undated) DEVICE — SYR LUERLOK 20CC BX/50

## (undated) DEVICE — UNDERCAST PADDING: Brand: DEROYAL

## (undated) DEVICE — SYR LUERLOK 20CC

## (undated) DEVICE — SUT VIC 2/0 CT2 27IN J269H

## (undated) DEVICE — SOL ISO/ALC RUB 70PCT 4OZ

## (undated) DEVICE — SOL NACL 0.9PCT 100ML SGL

## (undated) DEVICE — DRSNG WND GZ PAD BORDERED 4X8IN STRL

## (undated) DEVICE — SUT ETHIB 2 CV V37 MS/4 30IN MX69G

## (undated) DEVICE — APPL CHLORAPREP W/TINT 26ML ORNG

## (undated) DEVICE — 3M™ IOBAN™ 2 ANTIMICROBIAL INCISE DRAPE 6640EZ: Brand: IOBAN™ 2

## (undated) DEVICE — GLV SURG SIGNATURE ESSENTIAL PF LTX SZ8.5

## (undated) DEVICE — ENCORE® LATEX ORTHO SIZE 8.5, STERILE LATEX POWDER-FREE SURGICAL GLOVE: Brand: ENCORE